# Patient Record
Sex: FEMALE | Race: WHITE | NOT HISPANIC OR LATINO | Employment: OTHER | URBAN - METROPOLITAN AREA
[De-identification: names, ages, dates, MRNs, and addresses within clinical notes are randomized per-mention and may not be internally consistent; named-entity substitution may affect disease eponyms.]

---

## 2017-08-22 ENCOUNTER — APPOINTMENT (OUTPATIENT)
Dept: GENERAL RADIOLOGY | Facility: HOSPITAL | Age: 77
End: 2017-08-22

## 2017-08-22 ENCOUNTER — HOSPITAL ENCOUNTER (INPATIENT)
Facility: HOSPITAL | Age: 77
LOS: 8 days | Discharge: NURSING, REHAB OR LONG-TERM CARE | End: 2017-08-31
Attending: EMERGENCY MEDICINE | Admitting: HOSPITALIST

## 2017-08-22 DIAGNOSIS — N28.9 ACUTE RENAL INSUFFICIENCY: ICD-10-CM

## 2017-08-22 DIAGNOSIS — S72.002A CLOSED FRACTURE OF NECK OF LEFT FEMUR, INITIAL ENCOUNTER (HCC): Primary | ICD-10-CM
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
## 2017-08-22 DIAGNOSIS — R26.2 DIFFICULTY WALKING: ICD-10-CM
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
## 2017-08-22 LAB
ALBUMIN SERPL-MCNC: 4.3 G/DL (ref 3.5–5.2)
ALBUMIN/GLOB SERPL: 1.2 G/DL
ALP SERPL-CCNC: 75 U/L (ref 39–117)
ALT SERPL W P-5'-P-CCNC: 18 U/L (ref 1–33)
ANION GAP SERPL CALCULATED.3IONS-SCNC: 14.6 MMOL/L
AST SERPL-CCNC: 19 U/L (ref 1–32)
BASOPHILS # BLD AUTO: 0.04 10*3/MM3 (ref 0–0.2)
BASOPHILS NFR BLD AUTO: 0.3 % (ref 0–1.5)
BILIRUB SERPL-MCNC: 0.3 MG/DL (ref 0.1–1.2)
BUN BLD-MCNC: 35 MG/DL (ref 8–23)
BUN/CREAT SERPL: 19.2 (ref 7–25)
CALCIUM SPEC-SCNC: 10 MG/DL (ref 8.6–10.5)
CHLORIDE SERPL-SCNC: 99 MMOL/L (ref 98–107)
CO2 SERPL-SCNC: 27.4 MMOL/L (ref 22–29)
CREAT BLD-MCNC: 1.82 MG/DL (ref 0.57–1)
DEPRECATED RDW RBC AUTO: 51 FL (ref 37–54)
EOSINOPHIL # BLD AUTO: 0.24 10*3/MM3 (ref 0–0.7)
EOSINOPHIL NFR BLD AUTO: 1.9 % (ref 0.3–6.2)
ERYTHROCYTE [DISTWIDTH] IN BLOOD BY AUTOMATED COUNT: 15.2 % (ref 11.7–13)
GFR SERPL CREATININE-BSD FRML MDRD: 27 ML/MIN/1.73
GLOBULIN UR ELPH-MCNC: 3.6 GM/DL
GLUCOSE BLD-MCNC: 189 MG/DL (ref 65–99)
HCT VFR BLD AUTO: 41.3 % (ref 35.6–45.5)
HGB BLD-MCNC: 13.1 G/DL (ref 11.9–15.5)
IMM GRANULOCYTES # BLD: 0.1 10*3/MM3 (ref 0–0.03)
IMM GRANULOCYTES NFR BLD: 0.8 % (ref 0–0.5)
LYMPHOCYTES # BLD AUTO: 1.42 10*3/MM3 (ref 0.9–4.8)
LYMPHOCYTES NFR BLD AUTO: 11.2 % (ref 19.6–45.3)
MCH RBC QN AUTO: 29.1 PG (ref 26.9–32)
MCHC RBC AUTO-ENTMCNC: 31.7 G/DL (ref 32.4–36.3)
MCV RBC AUTO: 91.8 FL (ref 80.5–98.2)
MONOCYTES # BLD AUTO: 0.7 10*3/MM3 (ref 0.2–1.2)
MONOCYTES NFR BLD AUTO: 5.5 % (ref 5–12)
NEUTROPHILS # BLD AUTO: 10.17 10*3/MM3 (ref 1.9–8.1)
NEUTROPHILS NFR BLD AUTO: 80.3 % (ref 42.7–76)
PLATELET # BLD AUTO: 168 10*3/MM3 (ref 140–500)
PMV BLD AUTO: 10.3 FL (ref 6–12)
POTASSIUM BLD-SCNC: 3.6 MMOL/L (ref 3.5–5.2)
PROT SERPL-MCNC: 7.9 G/DL (ref 6–8.5)
RBC # BLD AUTO: 4.5 10*6/MM3 (ref 3.9–5.2)
SODIUM BLD-SCNC: 141 MMOL/L (ref 136–145)
WBC NRBC COR # BLD: 12.67 10*3/MM3 (ref 4.5–10.7)

## 2017-08-22 PROCEDURE — 73502 X-RAY EXAM HIP UNI 2-3 VIEWS: CPT

## 2017-08-22 PROCEDURE — 25010000002 ONDANSETRON PER 1 MG: Performed by: PHYSICIAN ASSISTANT

## 2017-08-22 PROCEDURE — 99284 EMERGENCY DEPT VISIT MOD MDM: CPT

## 2017-08-22 PROCEDURE — 93005 ELECTROCARDIOGRAM TRACING: CPT | Performed by: PHYSICIAN ASSISTANT

## 2017-08-22 PROCEDURE — 85025 COMPLETE CBC W/AUTO DIFF WBC: CPT | Performed by: PHYSICIAN ASSISTANT

## 2017-08-22 PROCEDURE — 80053 COMPREHEN METABOLIC PANEL: CPT | Performed by: PHYSICIAN ASSISTANT

## 2017-08-22 PROCEDURE — 86901 BLOOD TYPING SEROLOGIC RH(D): CPT | Performed by: PHYSICIAN ASSISTANT

## 2017-08-22 PROCEDURE — 86900 BLOOD TYPING SEROLOGIC ABO: CPT | Performed by: PHYSICIAN ASSISTANT

## 2017-08-22 PROCEDURE — 71010 HC CHEST PA OR AP: CPT

## 2017-08-22 PROCEDURE — 86850 RBC ANTIBODY SCREEN: CPT | Performed by: PHYSICIAN ASSISTANT

## 2017-08-22 PROCEDURE — 25010000002 HYDROMORPHONE PER 4 MG: Performed by: EMERGENCY MEDICINE

## 2017-08-22 RX ORDER — ONDANSETRON 2 MG/ML
4 INJECTION INTRAMUSCULAR; INTRAVENOUS ONCE
Status: COMPLETED | OUTPATIENT
Start: 2017-08-22 | End: 2017-08-22

## 2017-08-22 RX ORDER — SODIUM CHLORIDE 0.9 % (FLUSH) 0.9 %
10 SYRINGE (ML) INJECTION AS NEEDED
Status: DISCONTINUED | OUTPATIENT
Start: 2017-08-22 | End: 2017-08-31 | Stop reason: HOSPADM

## 2017-08-22 RX ADMIN — ONDANSETRON 4 MG: 2 INJECTION INTRAMUSCULAR; INTRAVENOUS at 22:50

## 2017-08-22 RX ADMIN — HYDROMORPHONE HYDROCHLORIDE 1 MG: 1 INJECTION, SOLUTION INTRAMUSCULAR; INTRAVENOUS; SUBCUTANEOUS at 22:51

## 2017-08-23 ENCOUNTER — APPOINTMENT (OUTPATIENT)
Dept: GENERAL RADIOLOGY | Facility: HOSPITAL | Age: 77
End: 2017-08-23

## 2017-08-23 ENCOUNTER — APPOINTMENT (OUTPATIENT)
Dept: CT IMAGING | Facility: HOSPITAL | Age: 77
End: 2017-08-23
Attending: ORTHOPAEDIC SURGERY

## 2017-08-23 ENCOUNTER — ANESTHESIA (OUTPATIENT)
Dept: PERIOP | Facility: HOSPITAL | Age: 77
End: 2017-08-23

## 2017-08-23 ENCOUNTER — APPOINTMENT (OUTPATIENT)
Dept: GENERAL RADIOLOGY | Facility: HOSPITAL | Age: 77
End: 2017-08-23
Attending: ORTHOPAEDIC SURGERY

## 2017-08-23 ENCOUNTER — ANESTHESIA EVENT (OUTPATIENT)
Dept: PERIOP | Facility: HOSPITAL | Age: 77
End: 2017-08-23

## 2017-08-23 PROBLEM — S72.002A CLOSED FRACTURE OF NECK OF LEFT FEMUR (HCC): Status: ACTIVE | Noted: 2017-08-23

## 2017-08-23 LAB
ABO GROUP BLD: NORMAL
ANION GAP SERPL CALCULATED.3IONS-SCNC: 13.3 MMOL/L
APTT PPP: 28.6 SECONDS (ref 22.7–35.4)
BACTERIA UR QL AUTO: NORMAL /HPF
BILIRUB UR QL STRIP: NEGATIVE
BLD GP AB SCN SERPL QL: NEGATIVE
BUN BLD-MCNC: 35 MG/DL (ref 8–23)
BUN/CREAT SERPL: 20.5 (ref 7–25)
CALCIUM SPEC-SCNC: 8.7 MG/DL (ref 8.6–10.5)
CHLORIDE SERPL-SCNC: 100 MMOL/L (ref 98–107)
CLARITY UR: CLEAR
CO2 SERPL-SCNC: 26.7 MMOL/L (ref 22–29)
COLOR UR: YELLOW
CREAT BLD-MCNC: 1.71 MG/DL (ref 0.57–1)
DEPRECATED RDW RBC AUTO: 51.3 FL (ref 37–54)
ERYTHROCYTE [DISTWIDTH] IN BLOOD BY AUTOMATED COUNT: 15.1 % (ref 11.7–13)
GFR SERPL CREATININE-BSD FRML MDRD: 29 ML/MIN/1.73
GLUCOSE BLD-MCNC: 146 MG/DL (ref 65–99)
GLUCOSE BLDC GLUCOMTR-MCNC: 100 MG/DL (ref 70–130)
GLUCOSE BLDC GLUCOMTR-MCNC: 124 MG/DL (ref 70–130)
GLUCOSE BLDC GLUCOMTR-MCNC: 125 MG/DL (ref 70–130)
GLUCOSE UR STRIP-MCNC: NEGATIVE MG/DL
HBA1C MFR BLD: 5.77 % (ref 4.8–5.6)
HCT VFR BLD AUTO: 38.4 % (ref 35.6–45.5)
HGB BLD-MCNC: 11.9 G/DL (ref 11.9–15.5)
HGB UR QL STRIP.AUTO: NEGATIVE
HYALINE CASTS UR QL AUTO: NORMAL /LPF
INR PPP: 1.07 (ref 0.9–1.1)
KETONES UR QL STRIP: NEGATIVE
LEUKOCYTE ESTERASE UR QL STRIP.AUTO: NEGATIVE
MAGNESIUM SERPL-MCNC: 2.2 MG/DL (ref 1.6–2.4)
MCH RBC QN AUTO: 28.6 PG (ref 26.9–32)
MCHC RBC AUTO-ENTMCNC: 31 G/DL (ref 32.4–36.3)
MCV RBC AUTO: 92.3 FL (ref 80.5–98.2)
NITRITE UR QL STRIP: NEGATIVE
PH UR STRIP.AUTO: 6 [PH] (ref 5–8)
PHOSPHATE SERPL-MCNC: 4.1 MG/DL (ref 2.5–4.5)
PLATELET # BLD AUTO: 148 10*3/MM3 (ref 140–500)
PMV BLD AUTO: 10.2 FL (ref 6–12)
POTASSIUM BLD-SCNC: 3.8 MMOL/L (ref 3.5–5.2)
PROT UR QL STRIP: ABNORMAL
PROTHROMBIN TIME: 13.5 SECONDS (ref 11.7–14.2)
RBC # BLD AUTO: 4.16 10*6/MM3 (ref 3.9–5.2)
RBC # UR: NORMAL /HPF
REF LAB TEST METHOD: NORMAL
RH BLD: POSITIVE
SODIUM BLD-SCNC: 140 MMOL/L (ref 136–145)
SP GR UR STRIP: 1.01 (ref 1–1.03)
SQUAMOUS #/AREA URNS HPF: NORMAL /HPF
UROBILINOGEN UR QL STRIP: ABNORMAL
WBC NRBC COR # BLD: 14.25 10*3/MM3 (ref 4.5–10.7)
WBC UR QL AUTO: NORMAL /HPF

## 2017-08-23 PROCEDURE — 25010000002 MIDAZOLAM PER 1 MG: Performed by: ANESTHESIOLOGY

## 2017-08-23 PROCEDURE — 25010000002 SUCCINYLCHOLINE PER 20 MG: Performed by: NURSE ANESTHETIST, CERTIFIED REGISTERED

## 2017-08-23 PROCEDURE — 83735 ASSAY OF MAGNESIUM: CPT | Performed by: HOSPITALIST

## 2017-08-23 PROCEDURE — 36415 COLL VENOUS BLD VENIPUNCTURE: CPT | Performed by: HOSPITALIST

## 2017-08-23 PROCEDURE — 25010000002 PHENYLEPHRINE PER 1 ML: Performed by: NURSE ANESTHETIST, CERTIFIED REGISTERED

## 2017-08-23 PROCEDURE — 73700 CT LOWER EXTREMITY W/O DYE: CPT

## 2017-08-23 PROCEDURE — 85027 COMPLETE CBC AUTOMATED: CPT | Performed by: HOSPITALIST

## 2017-08-23 PROCEDURE — 76000 FLUOROSCOPY <1 HR PHYS/QHP: CPT

## 2017-08-23 PROCEDURE — C1713 ANCHOR/SCREW BN/BN,TIS/BN: HCPCS | Performed by: ORTHOPAEDIC SURGERY

## 2017-08-23 PROCEDURE — 0QS736Z REPOSITION LEFT UPPER FEMUR WITH INTRAMEDULLARY INTERNAL FIXATION DEVICE, PERCUTANEOUS APPROACH: ICD-10-PCS | Performed by: ORTHOPAEDIC SURGERY

## 2017-08-23 PROCEDURE — 81001 URINALYSIS AUTO W/SCOPE: CPT | Performed by: PHYSICIAN ASSISTANT

## 2017-08-23 PROCEDURE — 82962 GLUCOSE BLOOD TEST: CPT

## 2017-08-23 PROCEDURE — 80048 BASIC METABOLIC PNL TOTAL CA: CPT | Performed by: HOSPITALIST

## 2017-08-23 PROCEDURE — 84100 ASSAY OF PHOSPHORUS: CPT | Performed by: HOSPITALIST

## 2017-08-23 PROCEDURE — 25010000002 HYDROMORPHONE PER 4 MG: Performed by: NURSE ANESTHETIST, CERTIFIED REGISTERED

## 2017-08-23 PROCEDURE — 73552 X-RAY EXAM OF FEMUR 2/>: CPT

## 2017-08-23 PROCEDURE — 83036 HEMOGLOBIN GLYCOSYLATED A1C: CPT | Performed by: HOSPITALIST

## 2017-08-23 PROCEDURE — 25010000002 ONDANSETRON PER 1 MG: Performed by: NURSE ANESTHETIST, CERTIFIED REGISTERED

## 2017-08-23 PROCEDURE — 25010000002 PROPOFOL 10 MG/ML EMULSION: Performed by: NURSE ANESTHETIST, CERTIFIED REGISTERED

## 2017-08-23 PROCEDURE — 25010000003 CEFAZOLIN IN DEXTROSE 2-4 GM/100ML-% SOLUTION: Performed by: ORTHOPAEDIC SURGERY

## 2017-08-23 PROCEDURE — 25010000002 DEXAMETHASONE PER 1 MG: Performed by: NURSE ANESTHETIST, CERTIFIED REGISTERED

## 2017-08-23 PROCEDURE — 25010000002 FENTANYL CITRATE (PF) 100 MCG/2ML SOLUTION: Performed by: NURSE ANESTHETIST, CERTIFIED REGISTERED

## 2017-08-23 PROCEDURE — 25010000002 HYDROMORPHONE PER 4 MG: Performed by: EMERGENCY MEDICINE

## 2017-08-23 PROCEDURE — 93010 ELECTROCARDIOGRAM REPORT: CPT | Performed by: INTERNAL MEDICINE

## 2017-08-23 PROCEDURE — 25010000002 KETOROLAC TROMETHAMINE PER 15 MG: Performed by: NURSE ANESTHETIST, CERTIFIED REGISTERED

## 2017-08-23 PROCEDURE — 85610 PROTHROMBIN TIME: CPT | Performed by: PHYSICIAN ASSISTANT

## 2017-08-23 PROCEDURE — 25010000002 ONDANSETRON PER 1 MG: Performed by: HOSPITALIST

## 2017-08-23 PROCEDURE — 25010000002 FENTANYL CITRATE (PF) 100 MCG/2ML SOLUTION: Performed by: ANESTHESIOLOGY

## 2017-08-23 PROCEDURE — 85730 THROMBOPLASTIN TIME PARTIAL: CPT | Performed by: PHYSICIAN ASSISTANT

## 2017-08-23 PROCEDURE — 25010000002 HYDROMORPHONE PER 4 MG: Performed by: HOSPITALIST

## 2017-08-23 PROCEDURE — 73502 X-RAY EXAM HIP UNI 2-3 VIEWS: CPT

## 2017-08-23 DEVICE — TRIGEN INTERTAN 1.5 11.5MM X 38CM                                    130DEGREE LEFT
Type: IMPLANTABLE DEVICE | Site: FEMUR | Status: FUNCTIONAL
Brand: TRIGEN

## 2017-08-23 DEVICE — TRIGEN LOW PROFILE SCREW 5.0MM X 42.5MM
Type: IMPLANTABLE DEVICE | Site: FEMUR | Status: FUNCTIONAL
Brand: TRIGEN

## 2017-08-23 DEVICE — INTERTAN LAG/COMPRESSION SCREW KIT                                    105MM / 100MM
Type: IMPLANTABLE DEVICE | Site: FEMUR | Status: FUNCTIONAL
Brand: TRIGEN

## 2017-08-23 RX ORDER — OXYCODONE AND ACETAMINOPHEN 7.5; 325 MG/1; MG/1
1 TABLET ORAL EVERY 4 HOURS PRN
Status: DISCONTINUED | OUTPATIENT
Start: 2017-08-23 | End: 2017-08-23

## 2017-08-23 RX ORDER — EPHEDRINE SULFATE 50 MG/ML
5 INJECTION, SOLUTION INTRAVENOUS ONCE AS NEEDED
Status: DISCONTINUED | OUTPATIENT
Start: 2017-08-23 | End: 2017-08-23 | Stop reason: HOSPADM

## 2017-08-23 RX ORDER — FAMOTIDINE 20 MG/1
20 TABLET, FILM COATED ORAL 2 TIMES DAILY
Status: DISCONTINUED | OUTPATIENT
Start: 2017-08-23 | End: 2017-08-25

## 2017-08-23 RX ORDER — PROMETHAZINE HYDROCHLORIDE 25 MG/ML
12.5 INJECTION, SOLUTION INTRAMUSCULAR; INTRAVENOUS ONCE AS NEEDED
Status: DISCONTINUED | OUTPATIENT
Start: 2017-08-23 | End: 2017-08-23 | Stop reason: HOSPADM

## 2017-08-23 RX ORDER — PROMETHAZINE HYDROCHLORIDE 25 MG/1
12.5 TABLET ORAL ONCE AS NEEDED
Status: DISCONTINUED | OUTPATIENT
Start: 2017-08-23 | End: 2017-08-23 | Stop reason: HOSPADM

## 2017-08-23 RX ORDER — HYDROCODONE BITARTRATE AND ACETAMINOPHEN 7.5; 325 MG/1; MG/1
1 TABLET ORAL ONCE AS NEEDED
Status: DISCONTINUED | OUTPATIENT
Start: 2017-08-23 | End: 2017-08-23 | Stop reason: HOSPADM

## 2017-08-23 RX ORDER — SUCCINYLCHOLINE CHLORIDE 20 MG/ML
INJECTION INTRAMUSCULAR; INTRAVENOUS AS NEEDED
Status: DISCONTINUED | OUTPATIENT
Start: 2017-08-23 | End: 2017-08-23 | Stop reason: SURG

## 2017-08-23 RX ORDER — OXYBUTYNIN CHLORIDE 5 MG/1
5 TABLET, EXTENDED RELEASE ORAL
COMMUNITY

## 2017-08-23 RX ORDER — ONDANSETRON 2 MG/ML
4 INJECTION INTRAMUSCULAR; INTRAVENOUS ONCE AS NEEDED
Status: DISCONTINUED | OUTPATIENT
Start: 2017-08-23 | End: 2017-08-23 | Stop reason: HOSPADM

## 2017-08-23 RX ORDER — SODIUM CHLORIDE 9 MG/ML
75 INJECTION, SOLUTION INTRAVENOUS CONTINUOUS
Status: DISCONTINUED | OUTPATIENT
Start: 2017-08-23 | End: 2017-08-29

## 2017-08-23 RX ORDER — FENTANYL CITRATE 50 UG/ML
50 INJECTION, SOLUTION INTRAMUSCULAR; INTRAVENOUS
Status: DISCONTINUED | OUTPATIENT
Start: 2017-08-23 | End: 2017-08-23 | Stop reason: HOSPADM

## 2017-08-23 RX ORDER — OXYCODONE AND ACETAMINOPHEN 7.5; 325 MG/1; MG/1
1 TABLET ORAL ONCE AS NEEDED
Status: DISCONTINUED | OUTPATIENT
Start: 2017-08-23 | End: 2017-08-23 | Stop reason: HOSPADM

## 2017-08-23 RX ORDER — OXYCODONE AND ACETAMINOPHEN 7.5; 325 MG/1; MG/1
1 TABLET ORAL EVERY 4 HOURS PRN
Status: DISCONTINUED | OUTPATIENT
Start: 2017-08-23 | End: 2017-08-25

## 2017-08-23 RX ORDER — LISINOPRIL AND HYDROCHLOROTHIAZIDE 25; 20 MG/1; MG/1
1 TABLET ORAL DAILY
COMMUNITY
End: 2017-08-31 | Stop reason: HOSPADM

## 2017-08-23 RX ORDER — ROCURONIUM BROMIDE 10 MG/ML
INJECTION, SOLUTION INTRAVENOUS AS NEEDED
Status: DISCONTINUED | OUTPATIENT
Start: 2017-08-23 | End: 2017-08-23 | Stop reason: SURG

## 2017-08-23 RX ORDER — ONDANSETRON 2 MG/ML
4 INJECTION INTRAMUSCULAR; INTRAVENOUS EVERY 6 HOURS PRN
Status: DISCONTINUED | OUTPATIENT
Start: 2017-08-23 | End: 2017-08-31 | Stop reason: HOSPADM

## 2017-08-23 RX ORDER — LABETALOL HYDROCHLORIDE 5 MG/ML
5 INJECTION, SOLUTION INTRAVENOUS
Status: DISCONTINUED | OUTPATIENT
Start: 2017-08-23 | End: 2017-08-23 | Stop reason: HOSPADM

## 2017-08-23 RX ORDER — MAGNESIUM HYDROXIDE 1200 MG/15ML
LIQUID ORAL AS NEEDED
Status: DISCONTINUED | OUTPATIENT
Start: 2017-08-23 | End: 2017-08-23 | Stop reason: HOSPADM

## 2017-08-23 RX ORDER — NALOXONE HCL 0.4 MG/ML
0.2 VIAL (ML) INJECTION AS NEEDED
Status: DISCONTINUED | OUTPATIENT
Start: 2017-08-23 | End: 2017-08-23 | Stop reason: HOSPADM

## 2017-08-23 RX ORDER — EPHEDRINE SULFATE 50 MG/ML
INJECTION, SOLUTION INTRAVENOUS AS NEEDED
Status: DISCONTINUED | OUTPATIENT
Start: 2017-08-23 | End: 2017-08-23 | Stop reason: SURG

## 2017-08-23 RX ORDER — ONDANSETRON 4 MG/1
4 TABLET, ORALLY DISINTEGRATING ORAL EVERY 6 HOURS PRN
Status: DISCONTINUED | OUTPATIENT
Start: 2017-08-23 | End: 2017-08-31 | Stop reason: HOSPADM

## 2017-08-23 RX ORDER — FLUMAZENIL 0.1 MG/ML
0.2 INJECTION INTRAVENOUS AS NEEDED
Status: DISCONTINUED | OUTPATIENT
Start: 2017-08-23 | End: 2017-08-23 | Stop reason: HOSPADM

## 2017-08-23 RX ORDER — MIDAZOLAM HYDROCHLORIDE 1 MG/ML
2 INJECTION INTRAMUSCULAR; INTRAVENOUS
Status: DISCONTINUED | OUTPATIENT
Start: 2017-08-23 | End: 2017-08-23 | Stop reason: HOSPADM

## 2017-08-23 RX ORDER — HYDROMORPHONE HYDROCHLORIDE 1 MG/ML
0.5 INJECTION, SOLUTION INTRAMUSCULAR; INTRAVENOUS; SUBCUTANEOUS
Status: DISCONTINUED | OUTPATIENT
Start: 2017-08-23 | End: 2017-08-23 | Stop reason: HOSPADM

## 2017-08-23 RX ORDER — HYDRALAZINE HYDROCHLORIDE 20 MG/ML
5 INJECTION INTRAMUSCULAR; INTRAVENOUS
Status: DISCONTINUED | OUTPATIENT
Start: 2017-08-23 | End: 2017-08-23 | Stop reason: HOSPADM

## 2017-08-23 RX ORDER — OXYCODONE AND ACETAMINOPHEN 7.5; 325 MG/1; MG/1
2 TABLET ORAL EVERY 4 HOURS PRN
Status: DISCONTINUED | OUTPATIENT
Start: 2017-08-23 | End: 2017-08-25

## 2017-08-23 RX ORDER — BISACODYL 5 MG/1
5 TABLET, DELAYED RELEASE ORAL DAILY PRN
Status: DISCONTINUED | OUTPATIENT
Start: 2017-08-23 | End: 2017-08-31 | Stop reason: HOSPADM

## 2017-08-23 RX ORDER — SODIUM CHLORIDE 0.9 % (FLUSH) 0.9 %
1-10 SYRINGE (ML) INJECTION AS NEEDED
Status: DISCONTINUED | OUTPATIENT
Start: 2017-08-23 | End: 2017-08-23 | Stop reason: HOSPADM

## 2017-08-23 RX ORDER — PROPOFOL 10 MG/ML
VIAL (ML) INTRAVENOUS AS NEEDED
Status: DISCONTINUED | OUTPATIENT
Start: 2017-08-23 | End: 2017-08-23 | Stop reason: SURG

## 2017-08-23 RX ORDER — GLYCOPYRROLATE 0.2 MG/ML
INJECTION INTRAMUSCULAR; INTRAVENOUS AS NEEDED
Status: DISCONTINUED | OUTPATIENT
Start: 2017-08-23 | End: 2017-08-23 | Stop reason: SURG

## 2017-08-23 RX ORDER — SODIUM CHLORIDE, SODIUM LACTATE, POTASSIUM CHLORIDE, CALCIUM CHLORIDE 600; 310; 30; 20 MG/100ML; MG/100ML; MG/100ML; MG/100ML
9 INJECTION, SOLUTION INTRAVENOUS CONTINUOUS
Status: DISCONTINUED | OUTPATIENT
Start: 2017-08-23 | End: 2017-08-23

## 2017-08-23 RX ORDER — FENTANYL CITRATE 50 UG/ML
INJECTION, SOLUTION INTRAMUSCULAR; INTRAVENOUS AS NEEDED
Status: DISCONTINUED | OUTPATIENT
Start: 2017-08-23 | End: 2017-08-23 | Stop reason: SURG

## 2017-08-23 RX ORDER — PROMETHAZINE HYDROCHLORIDE 25 MG/1
25 TABLET ORAL ONCE AS NEEDED
Status: DISCONTINUED | OUTPATIENT
Start: 2017-08-23 | End: 2017-08-23 | Stop reason: HOSPADM

## 2017-08-23 RX ORDER — MIDAZOLAM HYDROCHLORIDE 1 MG/ML
1 INJECTION INTRAMUSCULAR; INTRAVENOUS
Status: DISCONTINUED | OUTPATIENT
Start: 2017-08-23 | End: 2017-08-23 | Stop reason: HOSPADM

## 2017-08-23 RX ORDER — ALOGLIPTIN 25 MG/1
25 TABLET, FILM COATED ORAL DAILY
COMMUNITY
End: 2017-08-31 | Stop reason: HOSPADM

## 2017-08-23 RX ORDER — OXYCODONE AND ACETAMINOPHEN 7.5; 325 MG/1; MG/1
2 TABLET ORAL EVERY 4 HOURS PRN
Status: DISCONTINUED | OUTPATIENT
Start: 2017-09-02 | End: 2017-08-23 | Stop reason: SDUPTHER

## 2017-08-23 RX ORDER — ACETAMINOPHEN 325 MG/1
650 TABLET ORAL EVERY 4 HOURS PRN
Status: DISCONTINUED | OUTPATIENT
Start: 2017-08-23 | End: 2017-08-23

## 2017-08-23 RX ORDER — CEFAZOLIN SODIUM 2 G/100ML
2 INJECTION, SOLUTION INTRAVENOUS EVERY 8 HOURS
Status: COMPLETED | OUTPATIENT
Start: 2017-08-24 | End: 2017-08-24

## 2017-08-23 RX ORDER — DEXTROSE MONOHYDRATE 25 G/50ML
25 INJECTION, SOLUTION INTRAVENOUS
Status: DISCONTINUED | OUTPATIENT
Start: 2017-08-23 | End: 2017-08-31 | Stop reason: HOSPADM

## 2017-08-23 RX ORDER — HYDROMORPHONE HYDROCHLORIDE 1 MG/ML
0.5 INJECTION, SOLUTION INTRAMUSCULAR; INTRAVENOUS; SUBCUTANEOUS
Status: DISCONTINUED | OUTPATIENT
Start: 2017-08-23 | End: 2017-08-31 | Stop reason: HOSPADM

## 2017-08-23 RX ORDER — ONDANSETRON 4 MG/1
4 TABLET, FILM COATED ORAL EVERY 6 HOURS PRN
Status: DISCONTINUED | OUTPATIENT
Start: 2017-08-23 | End: 2017-08-31 | Stop reason: HOSPADM

## 2017-08-23 RX ORDER — OXYCODONE AND ACETAMINOPHEN 7.5; 325 MG/1; MG/1
1 TABLET ORAL EVERY 4 HOURS PRN
Status: DISCONTINUED | OUTPATIENT
Start: 2017-08-23 | End: 2017-08-23 | Stop reason: SDUPTHER

## 2017-08-23 RX ORDER — SERTRALINE HYDROCHLORIDE 100 MG/1
100 TABLET, FILM COATED ORAL DAILY
COMMUNITY

## 2017-08-23 RX ORDER — NALOXONE HCL 0.4 MG/ML
0.4 VIAL (ML) INJECTION
Status: DISCONTINUED | OUTPATIENT
Start: 2017-08-23 | End: 2017-08-31 | Stop reason: HOSPADM

## 2017-08-23 RX ORDER — CEFAZOLIN SODIUM 2 G/100ML
2 INJECTION, SOLUTION INTRAVENOUS ONCE
Status: COMPLETED | OUTPATIENT
Start: 2017-08-23 | End: 2017-08-23

## 2017-08-23 RX ORDER — BISACODYL 10 MG
10 SUPPOSITORY, RECTAL RECTAL DAILY PRN
Status: DISCONTINUED | OUTPATIENT
Start: 2017-08-23 | End: 2017-08-31 | Stop reason: HOSPADM

## 2017-08-23 RX ORDER — DEXAMETHASONE SODIUM PHOSPHATE 10 MG/ML
INJECTION INTRAMUSCULAR; INTRAVENOUS AS NEEDED
Status: DISCONTINUED | OUTPATIENT
Start: 2017-08-23 | End: 2017-08-23 | Stop reason: SURG

## 2017-08-23 RX ORDER — ONDANSETRON 2 MG/ML
INJECTION INTRAMUSCULAR; INTRAVENOUS AS NEEDED
Status: DISCONTINUED | OUTPATIENT
Start: 2017-08-23 | End: 2017-08-23 | Stop reason: SURG

## 2017-08-23 RX ORDER — SODIUM CHLORIDE 0.9 % (FLUSH) 0.9 %
1-10 SYRINGE (ML) INJECTION AS NEEDED
Status: DISCONTINUED | OUTPATIENT
Start: 2017-08-23 | End: 2017-08-31 | Stop reason: HOSPADM

## 2017-08-23 RX ORDER — ALBUTEROL SULFATE 2.5 MG/3ML
2.5 SOLUTION RESPIRATORY (INHALATION) ONCE AS NEEDED
Status: DISCONTINUED | OUTPATIENT
Start: 2017-08-23 | End: 2017-08-23 | Stop reason: HOSPADM

## 2017-08-23 RX ORDER — KETOROLAC TROMETHAMINE 30 MG/ML
INJECTION, SOLUTION INTRAMUSCULAR; INTRAVENOUS AS NEEDED
Status: DISCONTINUED | OUTPATIENT
Start: 2017-08-23 | End: 2017-08-23 | Stop reason: SURG

## 2017-08-23 RX ORDER — PROMETHAZINE HYDROCHLORIDE 25 MG/1
25 SUPPOSITORY RECTAL ONCE AS NEEDED
Status: DISCONTINUED | OUTPATIENT
Start: 2017-08-23 | End: 2017-08-23 | Stop reason: HOSPADM

## 2017-08-23 RX ORDER — DIPHENHYDRAMINE HYDROCHLORIDE 50 MG/ML
12.5 INJECTION INTRAMUSCULAR; INTRAVENOUS
Status: DISCONTINUED | OUTPATIENT
Start: 2017-08-23 | End: 2017-08-23 | Stop reason: HOSPADM

## 2017-08-23 RX ORDER — FAMOTIDINE 10 MG/ML
20 INJECTION, SOLUTION INTRAVENOUS ONCE
Status: COMPLETED | OUTPATIENT
Start: 2017-08-23 | End: 2017-08-23

## 2017-08-23 RX ORDER — NICOTINE POLACRILEX 4 MG
15 LOZENGE BUCCAL
Status: DISCONTINUED | OUTPATIENT
Start: 2017-08-23 | End: 2017-08-31 | Stop reason: HOSPADM

## 2017-08-23 RX ORDER — LIDOCAINE HYDROCHLORIDE 20 MG/ML
INJECTION, SOLUTION INFILTRATION; PERINEURAL AS NEEDED
Status: DISCONTINUED | OUTPATIENT
Start: 2017-08-23 | End: 2017-08-23 | Stop reason: SURG

## 2017-08-23 RX ADMIN — HYDROMORPHONE HYDROCHLORIDE 0.5 MG: 1 INJECTION, SOLUTION INTRAMUSCULAR; INTRAVENOUS; SUBCUTANEOUS at 03:42

## 2017-08-23 RX ADMIN — ONDANSETRON 4 MG: 2 INJECTION INTRAMUSCULAR; INTRAVENOUS at 17:22

## 2017-08-23 RX ADMIN — PHENYLEPHRINE HYDROCHLORIDE 100 MCG: 10 INJECTION INTRAVENOUS at 16:40

## 2017-08-23 RX ADMIN — HYDROMORPHONE HYDROCHLORIDE 0.5 MG: 1 INJECTION, SOLUTION INTRAMUSCULAR; INTRAVENOUS; SUBCUTANEOUS at 19:07

## 2017-08-23 RX ADMIN — PHENYLEPHRINE HYDROCHLORIDE 100 MCG: 10 INJECTION INTRAVENOUS at 17:09

## 2017-08-23 RX ADMIN — FENTANYL CITRATE 50 MCG: 50 INJECTION INTRAMUSCULAR; INTRAVENOUS at 19:36

## 2017-08-23 RX ADMIN — PROPOFOL 130 MG: 10 INJECTION, EMULSION INTRAVENOUS at 16:04

## 2017-08-23 RX ADMIN — ROCURONIUM BROMIDE 5 MG: 10 INJECTION INTRAVENOUS at 16:04

## 2017-08-23 RX ADMIN — MIDAZOLAM 1 MG: 1 INJECTION INTRAMUSCULAR; INTRAVENOUS at 15:42

## 2017-08-23 RX ADMIN — HYDROMORPHONE HYDROCHLORIDE 0.5 MG: 1 INJECTION, SOLUTION INTRAMUSCULAR; INTRAVENOUS; SUBCUTANEOUS at 05:43

## 2017-08-23 RX ADMIN — CEFAZOLIN SODIUM 2 G: 2 INJECTION, SOLUTION INTRAVENOUS at 23:02

## 2017-08-23 RX ADMIN — LIDOCAINE HYDROCHLORIDE 80 MG: 20 INJECTION, SOLUTION INFILTRATION; PERINEURAL at 16:04

## 2017-08-23 RX ADMIN — HYDROMORPHONE HYDROCHLORIDE 0.5 MG: 1 INJECTION, SOLUTION INTRAMUSCULAR; INTRAVENOUS; SUBCUTANEOUS at 13:12

## 2017-08-23 RX ADMIN — FENTANYL CITRATE 50 MCG: 50 INJECTION INTRAMUSCULAR; INTRAVENOUS at 18:48

## 2017-08-23 RX ADMIN — FENTANYL CITRATE 25 MCG: 50 INJECTION INTRAMUSCULAR; INTRAVENOUS at 15:42

## 2017-08-23 RX ADMIN — GLYCOPYRROLATE 0.2 MG: 0.2 INJECTION INTRAMUSCULAR; INTRAVENOUS at 16:00

## 2017-08-23 RX ADMIN — FAMOTIDINE 20 MG: 10 INJECTION INTRAVENOUS at 15:25

## 2017-08-23 RX ADMIN — PHENYLEPHRINE HYDROCHLORIDE 100 MCG: 10 INJECTION INTRAVENOUS at 18:00

## 2017-08-23 RX ADMIN — CEFAZOLIN SODIUM 2 G: 2 INJECTION, SOLUTION INTRAVENOUS at 16:00

## 2017-08-23 RX ADMIN — EPHEDRINE SULFATE 15 MG: 50 INJECTION INTRAMUSCULAR; INTRAVENOUS; SUBCUTANEOUS at 17:44

## 2017-08-23 RX ADMIN — SODIUM CHLORIDE 100 ML/HR: 9 INJECTION, SOLUTION INTRAVENOUS at 02:18

## 2017-08-23 RX ADMIN — SUGAMMADEX 2 ML: 100 INJECTION, SOLUTION INTRAVENOUS at 18:01

## 2017-08-23 RX ADMIN — SODIUM CHLORIDE 1000 ML: 9 INJECTION, SOLUTION INTRAVENOUS at 00:12

## 2017-08-23 RX ADMIN — OXYCODONE HYDROCHLORIDE AND ACETAMINOPHEN 1 TABLET: 7.5; 325 TABLET ORAL at 21:31

## 2017-08-23 RX ADMIN — PHENYLEPHRINE HYDROCHLORIDE 100 MCG: 10 INJECTION INTRAVENOUS at 16:25

## 2017-08-23 RX ADMIN — PHENYLEPHRINE HYDROCHLORIDE 100 MCG: 10 INJECTION INTRAVENOUS at 18:09

## 2017-08-23 RX ADMIN — HYDROMORPHONE HYDROCHLORIDE 0.5 MG: 1 INJECTION, SOLUTION INTRAMUSCULAR; INTRAVENOUS; SUBCUTANEOUS at 20:46

## 2017-08-23 RX ADMIN — PHENYLEPHRINE HYDROCHLORIDE 100 MCG: 10 INJECTION INTRAVENOUS at 18:14

## 2017-08-23 RX ADMIN — DEXAMETHASONE SODIUM PHOSPHATE 6 MG: 10 INJECTION INTRAMUSCULAR; INTRAVENOUS at 17:05

## 2017-08-23 RX ADMIN — HYDROMORPHONE HYDROCHLORIDE 0.5 MG: 1 INJECTION, SOLUTION INTRAMUSCULAR; INTRAVENOUS; SUBCUTANEOUS at 08:41

## 2017-08-23 RX ADMIN — SUCCINYLCHOLINE CHLORIDE 100 MG: 20 INJECTION, SOLUTION INTRAMUSCULAR; INTRAVENOUS; PARENTERAL at 16:04

## 2017-08-23 RX ADMIN — ROCURONIUM BROMIDE 20 MG: 10 INJECTION INTRAVENOUS at 16:14

## 2017-08-23 RX ADMIN — ONDANSETRON 4 MG: 2 INJECTION INTRAMUSCULAR; INTRAVENOUS at 08:41

## 2017-08-23 RX ADMIN — FENTANYL CITRATE 50 MCG: 50 INJECTION INTRAMUSCULAR; INTRAVENOUS at 16:00

## 2017-08-23 RX ADMIN — HYDROMORPHONE HYDROCHLORIDE 1 MG: 1 INJECTION, SOLUTION INTRAMUSCULAR; INTRAVENOUS; SUBCUTANEOUS at 01:31

## 2017-08-23 RX ADMIN — SODIUM CHLORIDE, POTASSIUM CHLORIDE, SODIUM LACTATE AND CALCIUM CHLORIDE 9 ML/HR: 600; 310; 30; 20 INJECTION, SOLUTION INTRAVENOUS at 15:25

## 2017-08-23 RX ADMIN — HYDROMORPHONE HYDROCHLORIDE 0.5 MG: 1 INJECTION, SOLUTION INTRAMUSCULAR; INTRAVENOUS; SUBCUTANEOUS at 10:41

## 2017-08-23 RX ADMIN — KETOROLAC TROMETHAMINE 30 MG: 30 INJECTION, SOLUTION INTRAMUSCULAR; INTRAVENOUS at 17:20

## 2017-08-23 RX ADMIN — PHENYLEPHRINE HYDROCHLORIDE 100 MCG: 10 INJECTION INTRAVENOUS at 16:29

## 2017-08-23 RX ADMIN — FENTANYL CITRATE 50 MCG: 50 INJECTION INTRAMUSCULAR; INTRAVENOUS at 18:21

## 2017-08-23 NOTE — ANESTHESIA PROCEDURE NOTES
Airway  Airway not difficult    General Information and Staff    Patient location during procedure: OR  Anesthesiologist: GARCÍA LEE  CRNA: JANA CHUNG    Indications and Patient Condition  Indications for airway management: airway protection    Preoxygenated: yes  MILS maintained throughout  Mask difficulty assessment: 1 - vent by mask    Final Airway Details  Final airway type: endotracheal airway      Successful airway: ETT  Cuffed: yes   Successful intubation technique: direct laryngoscopy  Facilitating devices/methods: cricoid pressure  Endotracheal tube insertion site: oral  Blade: Wilfrido  Blade size: #3  ETT size: 7.0 mm  Cormack-Lehane Classification: grade I - full view of glottis  Placement verified by: chest auscultation and capnometry   Inital cuff pressure (cm H2O): 22  Cuff volume (mL): 6  Measured from: lips  ETT to lips (cm): 22  Number of attempts at approach: 1

## 2017-08-23 NOTE — ANESTHESIA PREPROCEDURE EVALUATION
Anesthesia Evaluation     Patient summary reviewed and Nursing notes reviewed   NPO Solid Status: > 8 hours  NPO Liquid Status: > 8 hours     Airway   Mallampati: II  TM distance: <3 FB  Neck ROM: full  possible difficult intubation  Dental    (+) poor dentition    Pulmonary     breath sounds clear to auscultation  (+) COPD,   Cardiovascular     Rhythm: regular    (+) hypertension, hyperlipidemia      Neuro/Psych  (+) psychiatric history Anxiety,    GI/Hepatic/Renal/Endo    (+) obesity, morbid obesity, GERD, diabetes mellitus,     Musculoskeletal     Abdominal   (+) obese,    Substance History      OB/GYN          Other                                        Anesthesia Plan    ASA 3     general     intravenous induction   Anesthetic plan and risks discussed with patient.

## 2017-08-24 ENCOUNTER — APPOINTMENT (OUTPATIENT)
Dept: ULTRASOUND IMAGING | Facility: HOSPITAL | Age: 77
End: 2017-08-24

## 2017-08-24 ENCOUNTER — APPOINTMENT (OUTPATIENT)
Dept: GENERAL RADIOLOGY | Facility: HOSPITAL | Age: 77
End: 2017-08-24
Attending: ORTHOPAEDIC SURGERY

## 2017-08-24 PROBLEM — N17.9 AKI (ACUTE KIDNEY INJURY) (HCC): Status: ACTIVE | Noted: 2017-08-24

## 2017-08-24 LAB
ANION GAP SERPL CALCULATED.3IONS-SCNC: 15.1 MMOL/L
BACTERIA UR QL AUTO: ABNORMAL /HPF
BILIRUB UR QL STRIP: NEGATIVE
BUN BLD-MCNC: 39 MG/DL (ref 8–23)
BUN/CREAT SERPL: 13 (ref 7–25)
CALCIUM SPEC-SCNC: 9.2 MG/DL (ref 8.6–10.5)
CHLORIDE SERPL-SCNC: 100 MMOL/L (ref 98–107)
CLARITY UR: ABNORMAL
CO2 SERPL-SCNC: 24.9 MMOL/L (ref 22–29)
COLOR UR: YELLOW
CREAT BLD-MCNC: 3.01 MG/DL (ref 0.57–1)
DEPRECATED RDW RBC AUTO: 54.2 FL (ref 37–54)
ERYTHROCYTE [DISTWIDTH] IN BLOOD BY AUTOMATED COUNT: 15.8 % (ref 11.7–13)
GFR SERPL CREATININE-BSD FRML MDRD: 15 ML/MIN/1.73
GLUCOSE BLD-MCNC: 190 MG/DL (ref 65–99)
GLUCOSE BLDC GLUCOMTR-MCNC: 146 MG/DL (ref 70–130)
GLUCOSE BLDC GLUCOMTR-MCNC: 148 MG/DL (ref 70–130)
GLUCOSE BLDC GLUCOMTR-MCNC: 156 MG/DL (ref 70–130)
GLUCOSE BLDC GLUCOMTR-MCNC: 219 MG/DL (ref 70–130)
GLUCOSE UR STRIP-MCNC: NEGATIVE MG/DL
HCT VFR BLD AUTO: 29.3 % (ref 35.6–45.5)
HGB BLD-MCNC: 8.7 G/DL (ref 11.9–15.5)
HGB UR QL STRIP.AUTO: NEGATIVE
HYALINE CASTS UR QL AUTO: ABNORMAL /LPF
KETONES UR QL STRIP: ABNORMAL
LEUKOCYTE ESTERASE UR QL STRIP.AUTO: NEGATIVE
MCH RBC QN AUTO: 28.1 PG (ref 26.9–32)
MCHC RBC AUTO-ENTMCNC: 29.7 G/DL (ref 32.4–36.3)
MCV RBC AUTO: 94.5 FL (ref 80.5–98.2)
NITRITE UR QL STRIP: NEGATIVE
PH UR STRIP.AUTO: <=5 [PH] (ref 5–8)
PLATELET # BLD AUTO: 145 10*3/MM3 (ref 140–500)
PMV BLD AUTO: 10.6 FL (ref 6–12)
POTASSIUM BLD-SCNC: 4.5 MMOL/L (ref 3.5–5.2)
PROT UR QL STRIP: ABNORMAL
RBC # BLD AUTO: 3.1 10*6/MM3 (ref 3.9–5.2)
RBC # UR: ABNORMAL /HPF
REF LAB TEST METHOD: ABNORMAL
SODIUM BLD-SCNC: 140 MMOL/L (ref 136–145)
SP GR UR STRIP: >=1.03 (ref 1–1.03)
SQUAMOUS #/AREA URNS HPF: ABNORMAL /HPF
UROBILINOGEN UR QL STRIP: ABNORMAL
WBC NRBC COR # BLD: 16.9 10*3/MM3 (ref 4.5–10.7)
WBC UR QL AUTO: ABNORMAL /HPF
YEAST URNS QL MICRO: ABNORMAL /HPF

## 2017-08-24 PROCEDURE — 97162 PT EVAL MOD COMPLEX 30 MIN: CPT

## 2017-08-24 PROCEDURE — 25010000003 CEFAZOLIN IN DEXTROSE 2-4 GM/100ML-% SOLUTION: Performed by: ORTHOPAEDIC SURGERY

## 2017-08-24 PROCEDURE — 25010000002 ENOXAPARIN PER 10 MG: Performed by: ORTHOPAEDIC SURGERY

## 2017-08-24 PROCEDURE — 97110 THERAPEUTIC EXERCISES: CPT

## 2017-08-24 PROCEDURE — 73502 X-RAY EXAM HIP UNI 2-3 VIEWS: CPT

## 2017-08-24 PROCEDURE — 82962 GLUCOSE BLOOD TEST: CPT

## 2017-08-24 PROCEDURE — 76775 US EXAM ABDO BACK WALL LIM: CPT

## 2017-08-24 PROCEDURE — 87086 URINE CULTURE/COLONY COUNT: CPT | Performed by: INTERNAL MEDICINE

## 2017-08-24 PROCEDURE — 81001 URINALYSIS AUTO W/SCOPE: CPT | Performed by: INTERNAL MEDICINE

## 2017-08-24 PROCEDURE — 80048 BASIC METABOLIC PNL TOTAL CA: CPT | Performed by: ORTHOPAEDIC SURGERY

## 2017-08-24 PROCEDURE — 25010000002 HYDROMORPHONE PER 4 MG: Performed by: HOSPITALIST

## 2017-08-24 PROCEDURE — 85027 COMPLETE CBC AUTOMATED: CPT | Performed by: ORTHOPAEDIC SURGERY

## 2017-08-24 PROCEDURE — 63710000001 INSULIN ASPART PER 5 UNITS: Performed by: HOSPITALIST

## 2017-08-24 RX ADMIN — OXYCODONE HYDROCHLORIDE AND ACETAMINOPHEN 1 TABLET: 7.5; 325 TABLET ORAL at 02:57

## 2017-08-24 RX ADMIN — FAMOTIDINE 20 MG: 20 TABLET, FILM COATED ORAL at 18:04

## 2017-08-24 RX ADMIN — ENOXAPARIN SODIUM 30 MG: 30 INJECTION SUBCUTANEOUS at 08:46

## 2017-08-24 RX ADMIN — INSULIN ASPART 4 UNITS: 100 INJECTION, SOLUTION INTRAVENOUS; SUBCUTANEOUS at 08:47

## 2017-08-24 RX ADMIN — HYDROMORPHONE HYDROCHLORIDE 0.5 MG: 1 INJECTION, SOLUTION INTRAMUSCULAR; INTRAVENOUS; SUBCUTANEOUS at 04:56

## 2017-08-24 RX ADMIN — FAMOTIDINE 20 MG: 20 TABLET, FILM COATED ORAL at 08:46

## 2017-08-24 RX ADMIN — CEFAZOLIN SODIUM 2 G: 2 INJECTION, SOLUTION INTRAVENOUS at 08:46

## 2017-08-24 RX ADMIN — SODIUM CHLORIDE 100 ML/HR: 9 INJECTION, SOLUTION INTRAVENOUS at 14:33

## 2017-08-24 RX ADMIN — CEFAZOLIN SODIUM 2 G: 2 INJECTION, SOLUTION INTRAVENOUS at 16:10

## 2017-08-24 NOTE — ANESTHESIA POSTPROCEDURE EVALUATION
"Patient: Jaci Cervantes    Procedure Summary     Date Anesthesia Start Anesthesia Stop Room / Location    08/23/17 1556 1826  [facility] /  [facility]       Procedure Diagnosis Surgeon Provider    FEMUR INTRAMEDULLARY NAILING/RODDING (Left Thigh) Closed fracture of neck of left femur, initial encounter  (Closed fracture of neck of left femur, initial encounter [S72.002A]) MD Anthony Domínguez Jr., MD          Anesthesia Type: general  Last vitals  BP   113/56 (08/23/17 1945)    Temp        Pulse   92 (08/23/17 1945)   Resp   12 (08/23/17 1945)    SpO2   95 % (08/23/17 1945)      Post Anesthesia Care and Evaluation    Patient location during evaluation: PACU  Patient participation: complete - patient participated  Level of consciousness: awake and alert  Pain management: adequate  Airway patency: patent  Anesthetic complications: No anesthetic complications    Cardiovascular status: acceptable  Respiratory status: acceptable  Hydration status: acceptable    Comments: /56  Pulse 92  Temp 36.5 °C (97.7 °F) (Oral)   Resp 12  Ht 70\" (177.8 cm)  Wt 240 lb 3.2 oz (109 kg)  LMP Comment: postmenopausal  SpO2 95%  BMI 34.47 kg/m2      "

## 2017-08-25 PROBLEM — D62 ACUTE POST-HEMORRHAGIC ANEMIA: Status: ACTIVE | Noted: 2017-08-25

## 2017-08-25 LAB
ANION GAP SERPL CALCULATED.3IONS-SCNC: 16.9 MMOL/L
BASOPHILS # BLD AUTO: 0.04 10*3/MM3 (ref 0–0.2)
BASOPHILS NFR BLD AUTO: 0.2 % (ref 0–1.5)
BUN BLD-MCNC: 52 MG/DL (ref 8–23)
BUN/CREAT SERPL: 16 (ref 7–25)
CALCIUM SPEC-SCNC: 8.9 MG/DL (ref 8.6–10.5)
CHLORIDE SERPL-SCNC: 96 MMOL/L (ref 98–107)
CO2 SERPL-SCNC: 21.1 MMOL/L (ref 22–29)
CREAT BLD-MCNC: 3.24 MG/DL (ref 0.57–1)
CREAT UR-MCNC: 94.6 MG/DL
DEPRECATED RDW RBC AUTO: 52.2 FL (ref 37–54)
EOSINOPHIL # BLD AUTO: 0.28 10*3/MM3 (ref 0–0.7)
EOSINOPHIL NFR BLD AUTO: 1.7 % (ref 0.3–6.2)
ERYTHROCYTE [DISTWIDTH] IN BLOOD BY AUTOMATED COUNT: 15.6 % (ref 11.7–13)
GFR SERPL CREATININE-BSD FRML MDRD: 14 ML/MIN/1.73
GLUCOSE BLD-MCNC: 157 MG/DL (ref 65–99)
GLUCOSE BLDC GLUCOMTR-MCNC: 122 MG/DL (ref 70–130)
GLUCOSE BLDC GLUCOMTR-MCNC: 130 MG/DL (ref 70–130)
GLUCOSE BLDC GLUCOMTR-MCNC: 132 MG/DL (ref 70–130)
GLUCOSE BLDC GLUCOMTR-MCNC: 164 MG/DL (ref 70–130)
HCT VFR BLD AUTO: 25.1 % (ref 35.6–45.5)
HGB BLD-MCNC: 7.9 G/DL (ref 11.9–15.5)
IMM GRANULOCYTES # BLD: 0.13 10*3/MM3 (ref 0–0.03)
IMM GRANULOCYTES NFR BLD: 0.8 % (ref 0–0.5)
LYMPHOCYTES # BLD AUTO: 1.29 10*3/MM3 (ref 0.9–4.8)
LYMPHOCYTES NFR BLD AUTO: 8 % (ref 19.6–45.3)
MCH RBC QN AUTO: 28.8 PG (ref 26.9–32)
MCHC RBC AUTO-ENTMCNC: 31.5 G/DL (ref 32.4–36.3)
MCV RBC AUTO: 91.6 FL (ref 80.5–98.2)
MONOCYTES # BLD AUTO: 1.09 10*3/MM3 (ref 0.2–1.2)
MONOCYTES NFR BLD AUTO: 6.7 % (ref 5–12)
NEUTROPHILS # BLD AUTO: 13.32 10*3/MM3 (ref 1.9–8.1)
NEUTROPHILS NFR BLD AUTO: 82.6 % (ref 42.7–76)
NRBC BLD MANUAL-RTO: 0.1 /100 WBC (ref 0–0)
PLATELET # BLD AUTO: 109 10*3/MM3 (ref 140–500)
PMV BLD AUTO: 10.7 FL (ref 6–12)
POTASSIUM BLD-SCNC: 4.3 MMOL/L (ref 3.5–5.2)
PROT UR-MCNC: 87 MG/DL
RBC # BLD AUTO: 2.74 10*6/MM3 (ref 3.9–5.2)
SODIUM BLD-SCNC: 134 MMOL/L (ref 136–145)
WBC NRBC COR # BLD: 16.15 10*3/MM3 (ref 4.5–10.7)

## 2017-08-25 PROCEDURE — 82570 ASSAY OF URINE CREATININE: CPT | Performed by: INTERNAL MEDICINE

## 2017-08-25 PROCEDURE — 97110 THERAPEUTIC EXERCISES: CPT

## 2017-08-25 PROCEDURE — 84156 ASSAY OF PROTEIN URINE: CPT | Performed by: INTERNAL MEDICINE

## 2017-08-25 PROCEDURE — 85025 COMPLETE CBC W/AUTO DIFF WBC: CPT | Performed by: INTERNAL MEDICINE

## 2017-08-25 PROCEDURE — 82962 GLUCOSE BLOOD TEST: CPT

## 2017-08-25 PROCEDURE — 63710000001 INSULIN ASPART PER 5 UNITS: Performed by: HOSPITALIST

## 2017-08-25 PROCEDURE — 80048 BASIC METABOLIC PNL TOTAL CA: CPT | Performed by: INTERNAL MEDICINE

## 2017-08-25 PROCEDURE — 25010000002 ENOXAPARIN PER 10 MG: Performed by: ORTHOPAEDIC SURGERY

## 2017-08-25 RX ORDER — HYDROCODONE BITARTRATE AND ACETAMINOPHEN 5; 325 MG/1; MG/1
1 TABLET ORAL EVERY 4 HOURS PRN
Status: DISCONTINUED | OUTPATIENT
Start: 2017-08-25 | End: 2017-08-31 | Stop reason: HOSPADM

## 2017-08-25 RX ORDER — FAMOTIDINE 20 MG/1
20 TABLET, FILM COATED ORAL DAILY
Status: DISCONTINUED | OUTPATIENT
Start: 2017-08-26 | End: 2017-08-31 | Stop reason: HOSPADM

## 2017-08-25 RX ADMIN — OXYCODONE HYDROCHLORIDE AND ACETAMINOPHEN 1 TABLET: 7.5; 325 TABLET ORAL at 15:32

## 2017-08-25 RX ADMIN — ENOXAPARIN SODIUM 30 MG: 30 INJECTION SUBCUTANEOUS at 08:43

## 2017-08-25 RX ADMIN — INSULIN ASPART 2 UNITS: 100 INJECTION, SOLUTION INTRAVENOUS; SUBCUTANEOUS at 08:44

## 2017-08-25 RX ADMIN — FAMOTIDINE 20 MG: 20 TABLET, FILM COATED ORAL at 08:44

## 2017-08-26 LAB
ABO GROUP BLD: NORMAL
ANION GAP SERPL CALCULATED.3IONS-SCNC: 15.1 MMOL/L
BACTERIA SPEC AEROBE CULT: NO GROWTH
BASOPHILS # BLD AUTO: 0.05 10*3/MM3 (ref 0–0.2)
BASOPHILS NFR BLD AUTO: 0.4 % (ref 0–1.5)
BLD GP AB SCN SERPL QL: NEGATIVE
BUN BLD-MCNC: 55 MG/DL (ref 8–23)
BUN/CREAT SERPL: 20.7 (ref 7–25)
CALCIUM SPEC-SCNC: 8.6 MG/DL (ref 8.6–10.5)
CHLORIDE SERPL-SCNC: 102 MMOL/L (ref 98–107)
CO2 SERPL-SCNC: 20.9 MMOL/L (ref 22–29)
CREAT BLD-MCNC: 2.66 MG/DL (ref 0.57–1)
DEPRECATED RDW RBC AUTO: 53 FL (ref 37–54)
EOSINOPHIL # BLD AUTO: 0.24 10*3/MM3 (ref 0–0.7)
EOSINOPHIL NFR BLD AUTO: 2.1 % (ref 0.3–6.2)
ERYTHROCYTE [DISTWIDTH] IN BLOOD BY AUTOMATED COUNT: 15.6 % (ref 11.7–13)
GFR SERPL CREATININE-BSD FRML MDRD: 17 ML/MIN/1.73
GLUCOSE BLD-MCNC: 136 MG/DL (ref 65–99)
GLUCOSE BLDC GLUCOMTR-MCNC: 130 MG/DL (ref 70–130)
GLUCOSE BLDC GLUCOMTR-MCNC: 138 MG/DL (ref 70–130)
GLUCOSE BLDC GLUCOMTR-MCNC: 148 MG/DL (ref 70–130)
GLUCOSE BLDC GLUCOMTR-MCNC: 182 MG/DL (ref 70–130)
HCT VFR BLD AUTO: 22.1 % (ref 35.6–45.5)
HGB BLD-MCNC: 7.2 G/DL (ref 11.9–15.5)
IMM GRANULOCYTES # BLD: 0.08 10*3/MM3 (ref 0–0.03)
IMM GRANULOCYTES NFR BLD: 0.7 % (ref 0–0.5)
LYMPHOCYTES # BLD AUTO: 0.92 10*3/MM3 (ref 0.9–4.8)
LYMPHOCYTES NFR BLD AUTO: 7.9 % (ref 19.6–45.3)
MCH RBC QN AUTO: 30 PG (ref 26.9–32)
MCHC RBC AUTO-ENTMCNC: 32.6 G/DL (ref 32.4–36.3)
MCV RBC AUTO: 92.1 FL (ref 80.5–98.2)
MONOCYTES # BLD AUTO: 0.95 10*3/MM3 (ref 0.2–1.2)
MONOCYTES NFR BLD AUTO: 8.2 % (ref 5–12)
NEUTROPHILS # BLD AUTO: 9.41 10*3/MM3 (ref 1.9–8.1)
NEUTROPHILS NFR BLD AUTO: 80.7 % (ref 42.7–76)
PLATELET # BLD AUTO: 106 10*3/MM3 (ref 140–500)
PMV BLD AUTO: 10.4 FL (ref 6–12)
POTASSIUM BLD-SCNC: 4.1 MMOL/L (ref 3.5–5.2)
RBC # BLD AUTO: 2.4 10*6/MM3 (ref 3.9–5.2)
RH BLD: POSITIVE
SODIUM BLD-SCNC: 138 MMOL/L (ref 136–145)
WBC NRBC COR # BLD: 11.65 10*3/MM3 (ref 4.5–10.7)

## 2017-08-26 PROCEDURE — 36430 TRANSFUSION BLD/BLD COMPNT: CPT

## 2017-08-26 PROCEDURE — 82570 ASSAY OF URINE CREATININE: CPT | Performed by: INTERNAL MEDICINE

## 2017-08-26 PROCEDURE — 85025 COMPLETE CBC W/AUTO DIFF WBC: CPT | Performed by: INTERNAL MEDICINE

## 2017-08-26 PROCEDURE — 63710000001 INSULIN ASPART PER 5 UNITS: Performed by: HOSPITALIST

## 2017-08-26 PROCEDURE — 84156 ASSAY OF PROTEIN URINE: CPT | Performed by: INTERNAL MEDICINE

## 2017-08-26 PROCEDURE — 86900 BLOOD TYPING SEROLOGIC ABO: CPT

## 2017-08-26 PROCEDURE — 81050 URINALYSIS VOLUME MEASURE: CPT | Performed by: INTERNAL MEDICINE

## 2017-08-26 PROCEDURE — 86850 RBC ANTIBODY SCREEN: CPT | Performed by: INTERNAL MEDICINE

## 2017-08-26 PROCEDURE — 25010000002 ENOXAPARIN PER 10 MG: Performed by: ORTHOPAEDIC SURGERY

## 2017-08-26 PROCEDURE — 80048 BASIC METABOLIC PNL TOTAL CA: CPT | Performed by: INTERNAL MEDICINE

## 2017-08-26 PROCEDURE — 86923 COMPATIBILITY TEST ELECTRIC: CPT

## 2017-08-26 PROCEDURE — P9016 RBC LEUKOCYTES REDUCED: HCPCS

## 2017-08-26 PROCEDURE — 82962 GLUCOSE BLOOD TEST: CPT

## 2017-08-26 PROCEDURE — 97110 THERAPEUTIC EXERCISES: CPT

## 2017-08-26 PROCEDURE — 86900 BLOOD TYPING SEROLOGIC ABO: CPT | Performed by: INTERNAL MEDICINE

## 2017-08-26 PROCEDURE — 86901 BLOOD TYPING SEROLOGIC RH(D): CPT | Performed by: INTERNAL MEDICINE

## 2017-08-26 RX ORDER — ACETAMINOPHEN 325 MG/1
650 TABLET ORAL EVERY 6 HOURS PRN
Status: DISCONTINUED | OUTPATIENT
Start: 2017-08-26 | End: 2017-08-31 | Stop reason: HOSPADM

## 2017-08-26 RX ADMIN — FAMOTIDINE 20 MG: 20 TABLET, FILM COATED ORAL at 09:02

## 2017-08-26 RX ADMIN — SODIUM CHLORIDE 100 ML/HR: 9 INJECTION, SOLUTION INTRAVENOUS at 01:28

## 2017-08-26 RX ADMIN — INSULIN ASPART 2 UNITS: 100 INJECTION, SOLUTION INTRAVENOUS; SUBCUTANEOUS at 21:20

## 2017-08-26 RX ADMIN — SODIUM CHLORIDE 100 ML/HR: 9 INJECTION, SOLUTION INTRAVENOUS at 16:23

## 2017-08-26 RX ADMIN — ACETAMINOPHEN 650 MG: 325 TABLET ORAL at 17:20

## 2017-08-26 RX ADMIN — ACETAMINOPHEN 650 MG: 325 TABLET ORAL at 13:10

## 2017-08-26 RX ADMIN — ACETAMINOPHEN 650 MG: 325 TABLET ORAL at 23:42

## 2017-08-26 RX ADMIN — ENOXAPARIN SODIUM 30 MG: 30 INJECTION SUBCUTANEOUS at 09:02

## 2017-08-27 ENCOUNTER — APPOINTMENT (OUTPATIENT)
Dept: MRI IMAGING | Facility: HOSPITAL | Age: 77
End: 2017-08-27

## 2017-08-27 ENCOUNTER — APPOINTMENT (OUTPATIENT)
Dept: GENERAL RADIOLOGY | Facility: HOSPITAL | Age: 77
End: 2017-08-27

## 2017-08-27 ENCOUNTER — APPOINTMENT (OUTPATIENT)
Dept: CARDIOLOGY | Facility: HOSPITAL | Age: 77
End: 2017-08-27
Attending: INTERNAL MEDICINE

## 2017-08-27 PROBLEM — I63.9 ACUTE EMBOLIC STROKE (HCC): Status: ACTIVE | Noted: 2017-08-27

## 2017-08-27 PROBLEM — J18.9 RIGHT LOWER LOBE PNEUMONIA: Status: ACTIVE | Noted: 2017-08-27

## 2017-08-27 LAB
ABO + RH BLD: NORMAL
ANION GAP SERPL CALCULATED.3IONS-SCNC: 15 MMOL/L
BASOPHILS # BLD AUTO: 0.05 10*3/MM3 (ref 0–0.2)
BASOPHILS NFR BLD AUTO: 0.5 % (ref 0–1.5)
BH BB BLOOD EXPIRATION DATE: NORMAL
BH BB BLOOD TYPE BARCODE: 8400
BH BB DISPENSE STATUS: NORMAL
BH BB PRODUCT CODE: NORMAL
BH BB UNIT NUMBER: NORMAL
BUN BLD-MCNC: 55 MG/DL (ref 8–23)
BUN/CREAT SERPL: 23.8 (ref 7–25)
CALCIUM SPEC-SCNC: 8.6 MG/DL (ref 8.6–10.5)
CHLORIDE SERPL-SCNC: 102 MMOL/L (ref 98–107)
CO2 SERPL-SCNC: 21 MMOL/L (ref 22–29)
COLLECT DURATION TIME UR: 24 HRS
COLLECT DURATION TIME UR: 24 HRS
CREAT BLD-MCNC: 2.31 MG/DL (ref 0.57–1)
CREAT UR-MCNC: 81.4 MG/DL
CREATINE 24H UR-MRATE: 1.1 G/24 HR (ref 0.7–1.6)
DEPRECATED RDW RBC AUTO: 49.3 FL (ref 37–54)
EOSINOPHIL # BLD AUTO: 0.48 10*3/MM3 (ref 0–0.7)
EOSINOPHIL NFR BLD AUTO: 4.4 % (ref 0.3–6.2)
ERYTHROCYTE [DISTWIDTH] IN BLOOD BY AUTOMATED COUNT: 15.5 % (ref 11.7–13)
FERRITIN SERPL-MCNC: 204 NG/ML (ref 13–150)
GFR SERPL CREATININE-BSD FRML MDRD: 20 ML/MIN/1.73
GLUCOSE BLD-MCNC: 128 MG/DL (ref 65–99)
GLUCOSE BLDC GLUCOMTR-MCNC: 121 MG/DL (ref 70–130)
GLUCOSE BLDC GLUCOMTR-MCNC: 127 MG/DL (ref 70–130)
GLUCOSE BLDC GLUCOMTR-MCNC: 160 MG/DL (ref 70–130)
GLUCOSE BLDC GLUCOMTR-MCNC: 196 MG/DL (ref 70–130)
HCT VFR BLD AUTO: 23.8 % (ref 35.6–45.5)
HGB BLD-MCNC: 7.8 G/DL (ref 11.9–15.5)
IMM GRANULOCYTES # BLD: 0.08 10*3/MM3 (ref 0–0.03)
IMM GRANULOCYTES NFR BLD: 0.7 % (ref 0–0.5)
IRON 24H UR-MRATE: 27 MCG/DL (ref 37–145)
IRON SATN MFR SERPL: 12 % (ref 20–50)
LYMPHOCYTES # BLD AUTO: 1.29 10*3/MM3 (ref 0.9–4.8)
LYMPHOCYTES NFR BLD AUTO: 11.8 % (ref 19.6–45.3)
MCH RBC QN AUTO: 28.7 PG (ref 26.9–32)
MCHC RBC AUTO-ENTMCNC: 32.8 G/DL (ref 32.4–36.3)
MCV RBC AUTO: 87.5 FL (ref 80.5–98.2)
MONOCYTES # BLD AUTO: 0.98 10*3/MM3 (ref 0.2–1.2)
MONOCYTES NFR BLD AUTO: 9 % (ref 5–12)
NEUTROPHILS # BLD AUTO: 8.03 10*3/MM3 (ref 1.9–8.1)
NEUTROPHILS NFR BLD AUTO: 73.6 % (ref 42.7–76)
PLATELET # BLD AUTO: 129 10*3/MM3 (ref 140–500)
PMV BLD AUTO: 10.1 FL (ref 6–12)
POTASSIUM BLD-SCNC: 4 MMOL/L (ref 3.5–5.2)
PROT 24H UR-MRATE: 1147.5 MG/24HOURS (ref 0–150)
PROT UR-MCNC: 85 MG/DL
RBC # BLD AUTO: 2.72 10*6/MM3 (ref 3.9–5.2)
SODIUM BLD-SCNC: 138 MMOL/L (ref 136–145)
SPECIMEN VOL 24H UR: 1350 ML
SPECIMEN VOL 24H UR: 1350 ML
TIBC SERPL-MCNC: 224 MCG/DL (ref 298–536)
TRANSFERRIN SERPL-MCNC: 150 MG/DL (ref 200–360)
UNIT  ABO: NORMAL
UNIT  RH: NORMAL
WBC NRBC COR # BLD: 10.91 10*3/MM3 (ref 4.5–10.7)

## 2017-08-27 PROCEDURE — 82728 ASSAY OF FERRITIN: CPT | Performed by: INTERNAL MEDICINE

## 2017-08-27 PROCEDURE — 80048 BASIC METABOLIC PNL TOTAL CA: CPT | Performed by: INTERNAL MEDICINE

## 2017-08-27 PROCEDURE — 93005 ELECTROCARDIOGRAM TRACING: CPT | Performed by: INTERNAL MEDICINE

## 2017-08-27 PROCEDURE — 93226 XTRNL ECG REC<48 HR SCAN A/R: CPT

## 2017-08-27 PROCEDURE — 82962 GLUCOSE BLOOD TEST: CPT

## 2017-08-27 PROCEDURE — 70544 MR ANGIOGRAPHY HEAD W/O DYE: CPT

## 2017-08-27 PROCEDURE — 83540 ASSAY OF IRON: CPT | Performed by: INTERNAL MEDICINE

## 2017-08-27 PROCEDURE — 25010000002 CEFTRIAXONE PER 250 MG: Performed by: INTERNAL MEDICINE

## 2017-08-27 PROCEDURE — 85025 COMPLETE CBC W/AUTO DIFF WBC: CPT | Performed by: INTERNAL MEDICINE

## 2017-08-27 PROCEDURE — 63710000001 INSULIN ASPART PER 5 UNITS: Performed by: HOSPITALIST

## 2017-08-27 PROCEDURE — 84466 ASSAY OF TRANSFERRIN: CPT | Performed by: INTERNAL MEDICINE

## 2017-08-27 PROCEDURE — 93225 XTRNL ECG REC<48 HRS REC: CPT

## 2017-08-27 PROCEDURE — 70547 MR ANGIOGRAPHY NECK W/O DYE: CPT

## 2017-08-27 PROCEDURE — 71020 HC CHEST PA AND LATERAL: CPT

## 2017-08-27 PROCEDURE — 70551 MRI BRAIN STEM W/O DYE: CPT

## 2017-08-27 PROCEDURE — 25010000002 ENOXAPARIN PER 10 MG: Performed by: ORTHOPAEDIC SURGERY

## 2017-08-27 PROCEDURE — 93010 ELECTROCARDIOGRAM REPORT: CPT | Performed by: INTERNAL MEDICINE

## 2017-08-27 RX ORDER — LABETALOL 100 MG/1
100 TABLET, FILM COATED ORAL EVERY 12 HOURS SCHEDULED
Status: DISCONTINUED | OUTPATIENT
Start: 2017-08-27 | End: 2017-08-31 | Stop reason: HOSPADM

## 2017-08-27 RX ORDER — HYDRALAZINE HYDROCHLORIDE 20 MG/ML
10 INJECTION INTRAMUSCULAR; INTRAVENOUS EVERY 6 HOURS PRN
Status: DISCONTINUED | OUTPATIENT
Start: 2017-08-27 | End: 2017-08-31 | Stop reason: HOSPADM

## 2017-08-27 RX ORDER — CEFTRIAXONE SODIUM 1 G/50ML
1 INJECTION, SOLUTION INTRAVENOUS EVERY 24 HOURS
Status: DISCONTINUED | OUTPATIENT
Start: 2017-08-27 | End: 2017-08-31 | Stop reason: HOSPADM

## 2017-08-27 RX ORDER — SODIUM CHLORIDE 0.9 % (FLUSH) 0.9 %
1-10 SYRINGE (ML) INJECTION AS NEEDED
Status: DISCONTINUED | OUTPATIENT
Start: 2017-08-27 | End: 2017-08-31 | Stop reason: HOSPADM

## 2017-08-27 RX ORDER — ATORVASTATIN CALCIUM 80 MG/1
80 TABLET, FILM COATED ORAL NIGHTLY
Status: DISCONTINUED | OUTPATIENT
Start: 2017-08-27 | End: 2017-08-31 | Stop reason: HOSPADM

## 2017-08-27 RX ORDER — ASPIRIN 300 MG/1
300 SUPPOSITORY RECTAL DAILY
Status: DISCONTINUED | OUTPATIENT
Start: 2017-08-27 | End: 2017-08-31 | Stop reason: HOSPADM

## 2017-08-27 RX ORDER — CYCLOBENZAPRINE HCL 10 MG
5 TABLET ORAL 3 TIMES DAILY PRN
Status: DISCONTINUED | OUTPATIENT
Start: 2017-08-27 | End: 2017-08-31 | Stop reason: HOSPADM

## 2017-08-27 RX ORDER — SERTRALINE HYDROCHLORIDE 100 MG/1
100 TABLET, FILM COATED ORAL DAILY
Status: DISCONTINUED | OUTPATIENT
Start: 2017-08-27 | End: 2017-08-27

## 2017-08-27 RX ORDER — ASPIRIN 325 MG
325 TABLET ORAL DAILY
Status: DISCONTINUED | OUTPATIENT
Start: 2017-08-27 | End: 2017-08-31 | Stop reason: HOSPADM

## 2017-08-27 RX ADMIN — CYCLOBENZAPRINE HYDROCHLORIDE 5 MG: 10 TABLET, FILM COATED ORAL at 21:08

## 2017-08-27 RX ADMIN — ACETAMINOPHEN 650 MG: 325 TABLET ORAL at 13:54

## 2017-08-27 RX ADMIN — CEFTRIAXONE SODIUM 1 G: 1 INJECTION, SOLUTION INTRAVENOUS at 18:23

## 2017-08-27 RX ADMIN — ACETAMINOPHEN 650 MG: 325 TABLET ORAL at 06:09

## 2017-08-27 RX ADMIN — HYDROCODONE BITARTRATE AND ACETAMINOPHEN 1 TABLET: 5; 325 TABLET ORAL at 21:09

## 2017-08-27 RX ADMIN — ASPIRIN 325 MG: 325 TABLET ORAL at 21:07

## 2017-08-27 RX ADMIN — LABETALOL HCL 100 MG: 100 TABLET, FILM COATED ORAL at 21:07

## 2017-08-27 RX ADMIN — ENOXAPARIN SODIUM 30 MG: 30 INJECTION SUBCUTANEOUS at 09:23

## 2017-08-27 RX ADMIN — FAMOTIDINE 20 MG: 20 TABLET, FILM COATED ORAL at 09:24

## 2017-08-27 RX ADMIN — INSULIN ASPART 2 UNITS: 100 INJECTION, SOLUTION INTRAVENOUS; SUBCUTANEOUS at 21:09

## 2017-08-28 ENCOUNTER — APPOINTMENT (OUTPATIENT)
Dept: CARDIOLOGY | Facility: HOSPITAL | Age: 77
End: 2017-08-28
Attending: INTERNAL MEDICINE

## 2017-08-28 LAB
ANION GAP SERPL CALCULATED.3IONS-SCNC: 13.9 MMOL/L
APTT PPP: 34.5 SECONDS (ref 22.7–35.4)
BASOPHILS # BLD AUTO: 0.04 10*3/MM3 (ref 0–0.2)
BASOPHILS # BLD AUTO: 0.05 10*3/MM3 (ref 0–0.2)
BASOPHILS NFR BLD AUTO: 0.4 % (ref 0–1.5)
BASOPHILS NFR BLD AUTO: 0.5 % (ref 0–1.5)
BH CV ECHO MEAS - ACS: 2 CM
BH CV ECHO MEAS - AO MAX PG: 10 MMHG
BH CV ECHO MEAS - AO MEAN PG (FULL): 3 MMHG
BH CV ECHO MEAS - AO MEAN PG: 5 MMHG
BH CV ECHO MEAS - AO ROOT AREA (BSA CORRECTED): 1.6
BH CV ECHO MEAS - AO ROOT AREA: 10.2 CM^2
BH CV ECHO MEAS - AO ROOT DIAM: 3.6 CM
BH CV ECHO MEAS - AO V2 MAX: 158 CM/SEC
BH CV ECHO MEAS - AO V2 MEAN: 108 CM/SEC
BH CV ECHO MEAS - AO V2 VTI: 35.3 CM
BH CV ECHO MEAS - AVA(I,A): 2.6 CM^2
BH CV ECHO MEAS - AVA(I,D): 2.6 CM^2
BH CV ECHO MEAS - BSA(HAYCOCK): 2.4 M^2
BH CV ECHO MEAS - BSA: 2.3 M^2
BH CV ECHO MEAS - BZI_BMI: 35.7 KILOGRAMS/M^2
BH CV ECHO MEAS - BZI_METRIC_HEIGHT: 177.8 CM
BH CV ECHO MEAS - BZI_METRIC_WEIGHT: 112.9 KG
BH CV ECHO MEAS - CONTRAST EF (2CH): 58.2 ML/M^2
BH CV ECHO MEAS - CONTRAST EF 4CH: 57.6 ML/M^2
BH CV ECHO MEAS - EDV(CUBED): 110.6 ML
BH CV ECHO MEAS - EDV(MOD-SP2): 146 ML
BH CV ECHO MEAS - EDV(MOD-SP4): 139 ML
BH CV ECHO MEAS - EDV(TEICH): 107.5 ML
BH CV ECHO MEAS - EF(CUBED): 61.2 %
BH CV ECHO MEAS - EF(MOD-SP2): 58.2 %
BH CV ECHO MEAS - EF(MOD-SP4): 57.6 %
BH CV ECHO MEAS - EF(TEICH): 52.7 %
BH CV ECHO MEAS - ESV(CUBED): 42.9 ML
BH CV ECHO MEAS - ESV(MOD-SP2): 61 ML
BH CV ECHO MEAS - ESV(MOD-SP4): 59 ML
BH CV ECHO MEAS - ESV(TEICH): 50.9 ML
BH CV ECHO MEAS - FS: 27.1 %
BH CV ECHO MEAS - IVS/LVPW: 1.1
BH CV ECHO MEAS - IVSD: 1.1 CM
BH CV ECHO MEAS - LA DIMENSION: 3.9 CM
BH CV ECHO MEAS - LA/AO: 1.1
BH CV ECHO MEAS - LAT PEAK E' VEL: 4 CM/SEC
BH CV ECHO MEAS - LV DIASTOLIC VOL/BSA (35-75): 60.7 ML/M^2
BH CV ECHO MEAS - LV MASS(C)D: 181.9 GRAMS
BH CV ECHO MEAS - LV MASS(C)DI: 79.4 GRAMS/M^2
BH CV ECHO MEAS - LV MEAN PG: 2 MMHG
BH CV ECHO MEAS - LV SYSTOLIC VOL/BSA (12-30): 25.8 ML/M^2
BH CV ECHO MEAS - LV V1 MAX: 112 CM/SEC
BH CV ECHO MEAS - LV V1 MEAN: 71.9 CM/SEC
BH CV ECHO MEAS - LV V1 VTI: 26.5 CM
BH CV ECHO MEAS - LVIDD: 4.8 CM
BH CV ECHO MEAS - LVIDS: 3.5 CM
BH CV ECHO MEAS - LVLD AP2: 9.1 CM
BH CV ECHO MEAS - LVLD AP4: 9 CM
BH CV ECHO MEAS - LVLS AP2: 7.8 CM
BH CV ECHO MEAS - LVLS AP4: 7.6 CM
BH CV ECHO MEAS - LVOT AREA (M): 3.5 CM^2
BH CV ECHO MEAS - LVOT AREA: 3.5 CM^2
BH CV ECHO MEAS - LVOT DIAM: 2.1 CM
BH CV ECHO MEAS - LVPWD: 1 CM
BH CV ECHO MEAS - MED PEAK E' VEL: 6 CM/SEC
BH CV ECHO MEAS - MV A DUR: 0.14 SEC
BH CV ECHO MEAS - MV A MAX VEL: 133 CM/SEC
BH CV ECHO MEAS - MV DEC SLOPE: 849 CM/SEC^2
BH CV ECHO MEAS - MV DEC TIME: 0.13 SEC
BH CV ECHO MEAS - MV E MAX VEL: 101 CM/SEC
BH CV ECHO MEAS - MV E/A: 0.76
BH CV ECHO MEAS - MV MEAN PG: 5 MMHG
BH CV ECHO MEAS - MV P1/2T MAX VEL: 142 CM/SEC
BH CV ECHO MEAS - MV P1/2T: 49 MSEC
BH CV ECHO MEAS - MV V2 MEAN: 110 CM/SEC
BH CV ECHO MEAS - MV V2 VTI: 34 CM
BH CV ECHO MEAS - MVA P1/2T LCG: 1.5 CM^2
BH CV ECHO MEAS - MVA(P1/2T): 4.5 CM^2
BH CV ECHO MEAS - MVA(VTI): 2.7 CM^2
BH CV ECHO MEAS - PA ACC SLOPE: 998 CM/SEC^2
BH CV ECHO MEAS - PA ACC TIME: 0.1 SEC
BH CV ECHO MEAS - PA MAX PG: 4.4 MMHG
BH CV ECHO MEAS - PA PR(ACCEL): 34.5 MMHG
BH CV ECHO MEAS - PA V2 MAX: 105 CM/SEC
BH CV ECHO MEAS - PULM A REVS DUR: 0.13 SEC
BH CV ECHO MEAS - PULM A REVS VEL: 30.5 CM/SEC
BH CV ECHO MEAS - PULM DIAS VEL: 33.5 CM/SEC
BH CV ECHO MEAS - PULM S/D: 1.3
BH CV ECHO MEAS - PULM SYS VEL: 42.8 CM/SEC
BH CV ECHO MEAS - QP/QS: 0.81
BH CV ECHO MEAS - RV MEAN PG: 1 MMHG
BH CV ECHO MEAS - RV V1 MEAN: 58.2 CM/SEC
BH CV ECHO MEAS - RV V1 VTI: 18 CM
BH CV ECHO MEAS - RVOT AREA: 4.2 CM^2
BH CV ECHO MEAS - RVOT DIAM: 2.3 CM
BH CV ECHO MEAS - SI(AO): 156.8 ML/M^2
BH CV ECHO MEAS - SI(CUBED): 29.6 ML/M^2
BH CV ECHO MEAS - SI(LVOT): 40.1 ML/M^2
BH CV ECHO MEAS - SI(MOD-SP2): 37.1 ML/M^2
BH CV ECHO MEAS - SI(MOD-SP4): 34.9 ML/M^2
BH CV ECHO MEAS - SI(TEICH): 24.7 ML/M^2
BH CV ECHO MEAS - SV(AO): 359.3 ML
BH CV ECHO MEAS - SV(CUBED): 67.7 ML
BH CV ECHO MEAS - SV(LVOT): 91.8 ML
BH CV ECHO MEAS - SV(MOD-SP2): 85 ML
BH CV ECHO MEAS - SV(MOD-SP4): 80 ML
BH CV ECHO MEAS - SV(RVOT): 74.8 ML
BH CV ECHO MEAS - SV(TEICH): 56.7 ML
BH CV ECHO MEAS - TAPSE (>1.6): 2.2 CM2
BH CV VAS BP LEFT ARM: NORMAL MMHG
BH CV XLRA - RV BASE: 3.9 CM
BH CV XLRA - RV LENGTH: 7.8 CM
BH CV XLRA - RV MID: 2.6 CM
BH CV XLRA - TDI S': 12 CM/SEC
BUN BLD-MCNC: 48 MG/DL (ref 8–23)
BUN/CREAT SERPL: 26.2 (ref 7–25)
CALCIUM SPEC-SCNC: 8.4 MG/DL (ref 8.6–10.5)
CHLORIDE SERPL-SCNC: 105 MMOL/L (ref 98–107)
CHOLEST SERPL-MCNC: 164 MG/DL (ref 0–200)
CO2 SERPL-SCNC: 23.1 MMOL/L (ref 22–29)
CREAT BLD-MCNC: 1.83 MG/DL (ref 0.57–1)
DEPRECATED RDW RBC AUTO: 51.2 FL (ref 37–54)
DEPRECATED RDW RBC AUTO: 52 FL (ref 37–54)
E/E' RATIO: 21
EOSINOPHIL # BLD AUTO: 0.57 10*3/MM3 (ref 0–0.7)
EOSINOPHIL # BLD AUTO: 0.59 10*3/MM3 (ref 0–0.7)
EOSINOPHIL NFR BLD AUTO: 6.1 % (ref 0.3–6.2)
EOSINOPHIL NFR BLD AUTO: 6.5 % (ref 0.3–6.2)
ERYTHROCYTE [DISTWIDTH] IN BLOOD BY AUTOMATED COUNT: 15.4 % (ref 11.7–13)
ERYTHROCYTE [DISTWIDTH] IN BLOOD BY AUTOMATED COUNT: 15.4 % (ref 11.7–13)
FOLATE SERPL-MCNC: 4.44 NG/ML (ref 4.78–24.2)
GFR SERPL CREATININE-BSD FRML MDRD: 27 ML/MIN/1.73
GLUCOSE BLD-MCNC: 97 MG/DL (ref 65–99)
GLUCOSE BLDC GLUCOMTR-MCNC: 100 MG/DL (ref 70–130)
GLUCOSE BLDC GLUCOMTR-MCNC: 102 MG/DL (ref 70–130)
GLUCOSE BLDC GLUCOMTR-MCNC: 103 MG/DL (ref 70–130)
GLUCOSE BLDC GLUCOMTR-MCNC: 141 MG/DL (ref 70–130)
GLUCOSE BLDC GLUCOMTR-MCNC: 158 MG/DL (ref 70–130)
HCT VFR BLD AUTO: 24.2 % (ref 35.6–45.5)
HCT VFR BLD AUTO: 25.8 % (ref 35.6–45.5)
HDLC SERPL-MCNC: 29 MG/DL (ref 40–60)
HGB BLD-MCNC: 7.7 G/DL (ref 11.9–15.5)
HGB BLD-MCNC: 8.4 G/DL (ref 11.9–15.5)
IMM GRANULOCYTES # BLD: 0.08 10*3/MM3 (ref 0–0.03)
IMM GRANULOCYTES # BLD: 0.09 10*3/MM3 (ref 0–0.03)
IMM GRANULOCYTES NFR BLD: 0.9 % (ref 0–0.5)
IMM GRANULOCYTES NFR BLD: 1 % (ref 0–0.5)
INR PPP: 1.07 (ref 0.9–1.1)
LDLC SERPL CALC-MCNC: 113 MG/DL (ref 0–100)
LDLC/HDLC SERPL: 3.89 {RATIO}
LEFT ATRIUM VOLUME INDEX: 41 ML/M2
LEFT ATRIUM VOLUME: 86 CM3
LV EF 2D ECHO EST: 58 %
LYMPHOCYTES # BLD AUTO: 1.15 10*3/MM3 (ref 0.9–4.8)
LYMPHOCYTES # BLD AUTO: 1.18 10*3/MM3 (ref 0.9–4.8)
LYMPHOCYTES NFR BLD AUTO: 12.3 % (ref 19.6–45.3)
LYMPHOCYTES NFR BLD AUTO: 13 % (ref 19.6–45.3)
MCH RBC QN AUTO: 29.3 PG (ref 26.9–32)
MCH RBC QN AUTO: 30.1 PG (ref 26.9–32)
MCHC RBC AUTO-ENTMCNC: 31.8 G/DL (ref 32.4–36.3)
MCHC RBC AUTO-ENTMCNC: 32.6 G/DL (ref 32.4–36.3)
MCV RBC AUTO: 92 FL (ref 80.5–98.2)
MCV RBC AUTO: 92.5 FL (ref 80.5–98.2)
MONOCYTES # BLD AUTO: 0.92 10*3/MM3 (ref 0.2–1.2)
MONOCYTES # BLD AUTO: 1 10*3/MM3 (ref 0.2–1.2)
MONOCYTES NFR BLD AUTO: 10.1 % (ref 5–12)
MONOCYTES NFR BLD AUTO: 10.7 % (ref 5–12)
NEUTROPHILS # BLD AUTO: 6.27 10*3/MM3 (ref 1.9–8.1)
NEUTROPHILS # BLD AUTO: 6.48 10*3/MM3 (ref 1.9–8.1)
NEUTROPHILS NFR BLD AUTO: 69.1 % (ref 42.7–76)
NEUTROPHILS NFR BLD AUTO: 69.4 % (ref 42.7–76)
PLATELET # BLD AUTO: 126 10*3/MM3 (ref 140–500)
PLATELET # BLD AUTO: 142 10*3/MM3 (ref 140–500)
PMV BLD AUTO: 10.1 FL (ref 6–12)
PMV BLD AUTO: 9.9 FL (ref 6–12)
POTASSIUM BLD-SCNC: 3.9 MMOL/L (ref 3.5–5.2)
PROTHROMBIN TIME: 13.5 SECONDS (ref 11.7–14.2)
RBC # BLD AUTO: 2.63 10*6/MM3 (ref 3.9–5.2)
RBC # BLD AUTO: 2.79 10*6/MM3 (ref 3.9–5.2)
SODIUM BLD-SCNC: 142 MMOL/L (ref 136–145)
TRIGL SERPL-MCNC: 111 MG/DL (ref 0–150)
VIT B12 BLD-MCNC: 335 PG/ML (ref 211–946)
VLDLC SERPL-MCNC: 22.2 MG/DL (ref 5–40)
WBC NRBC COR # BLD: 9.08 10*3/MM3 (ref 4.5–10.7)
WBC NRBC COR # BLD: 9.34 10*3/MM3 (ref 4.5–10.7)

## 2017-08-28 PROCEDURE — 63710000001 INSULIN ASPART PER 5 UNITS: Performed by: HOSPITALIST

## 2017-08-28 PROCEDURE — 25010000002 CEFTRIAXONE PER 250 MG: Performed by: INTERNAL MEDICINE

## 2017-08-28 PROCEDURE — 97162 PT EVAL MOD COMPLEX 30 MIN: CPT

## 2017-08-28 PROCEDURE — 82607 VITAMIN B-12: CPT | Performed by: INTERNAL MEDICINE

## 2017-08-28 PROCEDURE — 25010000002 PERFLUTREN (DEFINITY) 8.476 MG IN SODIUM CHLORIDE 10 ML INJECTION: Performed by: INTERNAL MEDICINE

## 2017-08-28 PROCEDURE — 25010000002 ENOXAPARIN PER 10 MG: Performed by: ORTHOPAEDIC SURGERY

## 2017-08-28 PROCEDURE — 85610 PROTHROMBIN TIME: CPT | Performed by: INTERNAL MEDICINE

## 2017-08-28 PROCEDURE — 82962 GLUCOSE BLOOD TEST: CPT

## 2017-08-28 PROCEDURE — 80061 LIPID PANEL: CPT | Performed by: INTERNAL MEDICINE

## 2017-08-28 PROCEDURE — 25010000002 HEPARIN (PORCINE) PER 1000 UNITS: Performed by: INTERNAL MEDICINE

## 2017-08-28 PROCEDURE — 80048 BASIC METABOLIC PNL TOTAL CA: CPT | Performed by: INTERNAL MEDICINE

## 2017-08-28 PROCEDURE — 85730 THROMBOPLASTIN TIME PARTIAL: CPT | Performed by: INTERNAL MEDICINE

## 2017-08-28 PROCEDURE — C8929 TTE W OR WO FOL WCON,DOPPLER: HCPCS

## 2017-08-28 PROCEDURE — 99223 1ST HOSP IP/OBS HIGH 75: CPT | Performed by: RADIOLOGY

## 2017-08-28 PROCEDURE — 93306 TTE W/DOPPLER COMPLETE: CPT | Performed by: INTERNAL MEDICINE

## 2017-08-28 PROCEDURE — 85025 COMPLETE CBC W/AUTO DIFF WBC: CPT | Performed by: INTERNAL MEDICINE

## 2017-08-28 PROCEDURE — 82746 ASSAY OF FOLIC ACID SERUM: CPT | Performed by: INTERNAL MEDICINE

## 2017-08-28 PROCEDURE — 94799 UNLISTED PULMONARY SVC/PX: CPT

## 2017-08-28 RX ORDER — WARFARIN SODIUM 5 MG/1
5 TABLET ORAL
Status: DISCONTINUED | OUTPATIENT
Start: 2017-08-28 | End: 2017-08-31 | Stop reason: HOSPADM

## 2017-08-28 RX ORDER — NYSTATIN 100000 U/G
CREAM TOPICAL EVERY 12 HOURS SCHEDULED
Status: DISCONTINUED | OUTPATIENT
Start: 2017-08-28 | End: 2017-08-31 | Stop reason: HOSPADM

## 2017-08-28 RX ORDER — HEPARIN SODIUM 5000 [USP'U]/ML
30.1-35.4 INJECTION, SOLUTION INTRAVENOUS; SUBCUTANEOUS EVERY 6 HOURS PRN
Status: DISCONTINUED | OUTPATIENT
Start: 2017-08-28 | End: 2017-08-31

## 2017-08-28 RX ADMIN — LABETALOL HCL 100 MG: 100 TABLET, FILM COATED ORAL at 10:23

## 2017-08-28 RX ADMIN — NYSTATIN: 100000 CREAM TOPICAL at 22:07

## 2017-08-28 RX ADMIN — ATORVASTATIN CALCIUM 80 MG: 80 TABLET, FILM COATED ORAL at 21:02

## 2017-08-28 RX ADMIN — FAMOTIDINE 20 MG: 20 TABLET, FILM COATED ORAL at 10:24

## 2017-08-28 RX ADMIN — INSULIN ASPART 2 UNITS: 100 INJECTION, SOLUTION INTRAVENOUS; SUBCUTANEOUS at 21:01

## 2017-08-28 RX ADMIN — WARFARIN SODIUM 5 MG: 5 TABLET ORAL at 22:00

## 2017-08-28 RX ADMIN — ASPIRIN 325 MG: 325 TABLET ORAL at 10:23

## 2017-08-28 RX ADMIN — SODIUM CHLORIDE 75 ML/HR: 9 INJECTION, SOLUTION INTRAVENOUS at 10:22

## 2017-08-28 RX ADMIN — PERFLUTREN 3 ML: 6.52 INJECTION, SUSPENSION INTRAVENOUS at 11:48

## 2017-08-28 RX ADMIN — CYCLOBENZAPRINE HYDROCHLORIDE 5 MG: 10 TABLET, FILM COATED ORAL at 14:18

## 2017-08-28 RX ADMIN — HEPARIN SODIUM 8.85 UNITS/KG/HR: 10000 INJECTION, SOLUTION INTRAVENOUS at 22:03

## 2017-08-28 RX ADMIN — ENOXAPARIN SODIUM 30 MG: 30 INJECTION SUBCUTANEOUS at 10:30

## 2017-08-28 RX ADMIN — HYDROCODONE BITARTRATE AND ACETAMINOPHEN 1 TABLET: 5; 325 TABLET ORAL at 16:49

## 2017-08-28 RX ADMIN — LABETALOL HCL 100 MG: 100 TABLET, FILM COATED ORAL at 21:01

## 2017-08-28 RX ADMIN — CEFTRIAXONE SODIUM 1 G: 1 INJECTION, SOLUTION INTRAVENOUS at 16:45

## 2017-08-29 LAB
ANION GAP SERPL CALCULATED.3IONS-SCNC: 12.6 MMOL/L
APTT PPP: 68.3 SECONDS (ref 22.7–35.4)
BASOPHILS # BLD AUTO: 0.04 10*3/MM3 (ref 0–0.2)
BASOPHILS NFR BLD AUTO: 0.5 % (ref 0–1.5)
BUN BLD-MCNC: 41 MG/DL (ref 8–23)
BUN/CREAT SERPL: 28.3 (ref 7–25)
CALCIUM SPEC-SCNC: 8.4 MG/DL (ref 8.6–10.5)
CHLORIDE SERPL-SCNC: 106 MMOL/L (ref 98–107)
CO2 SERPL-SCNC: 22.4 MMOL/L (ref 22–29)
CREAT BLD-MCNC: 1.45 MG/DL (ref 0.57–1)
DEPRECATED RDW RBC AUTO: 52.8 FL (ref 37–54)
EOSINOPHIL # BLD AUTO: 0.54 10*3/MM3 (ref 0–0.7)
EOSINOPHIL NFR BLD AUTO: 6.3 % (ref 0.3–6.2)
ERYTHROCYTE [DISTWIDTH] IN BLOOD BY AUTOMATED COUNT: 15.5 % (ref 11.7–13)
GFR SERPL CREATININE-BSD FRML MDRD: 35 ML/MIN/1.73
GLUCOSE BLD-MCNC: 106 MG/DL (ref 65–99)
GLUCOSE BLDC GLUCOMTR-MCNC: 143 MG/DL (ref 70–130)
GLUCOSE BLDC GLUCOMTR-MCNC: 169 MG/DL (ref 70–130)
GLUCOSE BLDC GLUCOMTR-MCNC: 175 MG/DL (ref 70–130)
GLUCOSE BLDC GLUCOMTR-MCNC: 99 MG/DL (ref 70–130)
HCT VFR BLD AUTO: 23.7 % (ref 35.6–45.5)
HCT VFR BLD AUTO: 30.1 % (ref 35.6–45.5)
HGB BLD-MCNC: 7.4 G/DL (ref 11.9–15.5)
HGB BLD-MCNC: 9.6 G/DL (ref 11.9–15.5)
IMM GRANULOCYTES # BLD: 0.1 10*3/MM3 (ref 0–0.03)
IMM GRANULOCYTES NFR BLD: 1.2 % (ref 0–0.5)
INR PPP: 1.15 (ref 0.9–1.1)
LYMPHOCYTES # BLD AUTO: 1.14 10*3/MM3 (ref 0.9–4.8)
LYMPHOCYTES NFR BLD AUTO: 13.2 % (ref 19.6–45.3)
MCH RBC QN AUTO: 29.1 PG (ref 26.9–32)
MCHC RBC AUTO-ENTMCNC: 31.2 G/DL (ref 32.4–36.3)
MCV RBC AUTO: 93.3 FL (ref 80.5–98.2)
MONOCYTES # BLD AUTO: 0.97 10*3/MM3 (ref 0.2–1.2)
MONOCYTES NFR BLD AUTO: 11.3 % (ref 5–12)
NEUTROPHILS # BLD AUTO: 5.83 10*3/MM3 (ref 1.9–8.1)
NEUTROPHILS NFR BLD AUTO: 67.5 % (ref 42.7–76)
PLATELET # BLD AUTO: 132 10*3/MM3 (ref 140–500)
PMV BLD AUTO: 9.8 FL (ref 6–12)
POTASSIUM BLD-SCNC: 4.1 MMOL/L (ref 3.5–5.2)
PROTHROMBIN TIME: 14.3 SECONDS (ref 11.7–14.2)
RBC # BLD AUTO: 2.54 10*6/MM3 (ref 3.9–5.2)
SODIUM BLD-SCNC: 141 MMOL/L (ref 136–145)
WBC NRBC COR # BLD: 8.62 10*3/MM3 (ref 4.5–10.7)

## 2017-08-29 PROCEDURE — 86900 BLOOD TYPING SEROLOGIC ABO: CPT

## 2017-08-29 PROCEDURE — 85018 HEMOGLOBIN: CPT | Performed by: INTERNAL MEDICINE

## 2017-08-29 PROCEDURE — 92610 EVALUATE SWALLOWING FUNCTION: CPT | Performed by: SPEECH-LANGUAGE PATHOLOGIST

## 2017-08-29 PROCEDURE — 25010000002 CEFTRIAXONE PER 250 MG: Performed by: INTERNAL MEDICINE

## 2017-08-29 PROCEDURE — 97110 THERAPEUTIC EXERCISES: CPT

## 2017-08-29 PROCEDURE — 85014 HEMATOCRIT: CPT | Performed by: INTERNAL MEDICINE

## 2017-08-29 PROCEDURE — 97165 OT EVAL LOW COMPLEX 30 MIN: CPT

## 2017-08-29 PROCEDURE — 63710000001 INSULIN ASPART PER 5 UNITS: Performed by: HOSPITALIST

## 2017-08-29 PROCEDURE — 85730 THROMBOPLASTIN TIME PARTIAL: CPT | Performed by: INTERNAL MEDICINE

## 2017-08-29 PROCEDURE — 99232 SBSQ HOSP IP/OBS MODERATE 35: CPT | Performed by: NURSE PRACTITIONER

## 2017-08-29 PROCEDURE — 85025 COMPLETE CBC W/AUTO DIFF WBC: CPT | Performed by: INTERNAL MEDICINE

## 2017-08-29 PROCEDURE — P9016 RBC LEUKOCYTES REDUCED: HCPCS

## 2017-08-29 PROCEDURE — 86923 COMPATIBILITY TEST ELECTRIC: CPT

## 2017-08-29 PROCEDURE — 25010000002 HEPARIN (PORCINE) PER 1000 UNITS: Performed by: INTERNAL MEDICINE

## 2017-08-29 PROCEDURE — 85610 PROTHROMBIN TIME: CPT | Performed by: INTERNAL MEDICINE

## 2017-08-29 PROCEDURE — 82962 GLUCOSE BLOOD TEST: CPT

## 2017-08-29 PROCEDURE — 36430 TRANSFUSION BLD/BLD COMPNT: CPT

## 2017-08-29 PROCEDURE — 80048 BASIC METABOLIC PNL TOTAL CA: CPT | Performed by: INTERNAL MEDICINE

## 2017-08-29 RX ORDER — BUMETANIDE 0.25 MG/ML
1 INJECTION INTRAMUSCULAR; INTRAVENOUS ONCE
Status: COMPLETED | OUTPATIENT
Start: 2017-08-29 | End: 2017-08-29

## 2017-08-29 RX ADMIN — INSULIN ASPART 2 UNITS: 100 INJECTION, SOLUTION INTRAVENOUS; SUBCUTANEOUS at 17:42

## 2017-08-29 RX ADMIN — WARFARIN SODIUM 5 MG: 5 TABLET ORAL at 17:42

## 2017-08-29 RX ADMIN — HEPARIN SODIUM 8.85 UNITS/KG/HR: 10000 INJECTION, SOLUTION INTRAVENOUS at 23:07

## 2017-08-29 RX ADMIN — LABETALOL HCL 100 MG: 100 TABLET, FILM COATED ORAL at 08:36

## 2017-08-29 RX ADMIN — BUMETANIDE 1 MG: 0.25 INJECTION INTRAMUSCULAR; INTRAVENOUS at 15:37

## 2017-08-29 RX ADMIN — HYDROCODONE BITARTRATE AND ACETAMINOPHEN 1 TABLET: 5; 325 TABLET ORAL at 17:34

## 2017-08-29 RX ADMIN — FAMOTIDINE 20 MG: 20 TABLET, FILM COATED ORAL at 08:36

## 2017-08-29 RX ADMIN — ATORVASTATIN CALCIUM 80 MG: 80 TABLET, FILM COATED ORAL at 22:13

## 2017-08-29 RX ADMIN — LABETALOL HCL 100 MG: 100 TABLET, FILM COATED ORAL at 22:12

## 2017-08-29 RX ADMIN — CEFTRIAXONE SODIUM 1 G: 1 INJECTION, SOLUTION INTRAVENOUS at 16:12

## 2017-08-29 RX ADMIN — ASPIRIN 325 MG: 325 TABLET ORAL at 08:36

## 2017-08-29 RX ADMIN — CYCLOBENZAPRINE HYDROCHLORIDE 5 MG: 10 TABLET, FILM COATED ORAL at 15:37

## 2017-08-29 RX ADMIN — INSULIN ASPART 2 UNITS: 100 INJECTION, SOLUTION INTRAVENOUS; SUBCUTANEOUS at 22:16

## 2017-08-29 RX ADMIN — NYSTATIN: 100000 CREAM TOPICAL at 22:01

## 2017-08-29 RX ADMIN — HYDROCODONE BITARTRATE AND ACETAMINOPHEN 1 TABLET: 5; 325 TABLET ORAL at 05:53

## 2017-08-30 ENCOUNTER — APPOINTMENT (OUTPATIENT)
Dept: GENERAL RADIOLOGY | Facility: HOSPITAL | Age: 77
End: 2017-08-30

## 2017-08-30 LAB
ABO + RH BLD: NORMAL
ABO + RH BLD: NORMAL
ANION GAP SERPL CALCULATED.3IONS-SCNC: 12.6 MMOL/L
APTT PPP: 69.7 SECONDS (ref 22.7–35.4)
APTT PPP: 92.3 SECONDS (ref 22.7–35.4)
BASOPHILS # BLD AUTO: 0.06 10*3/MM3 (ref 0–0.2)
BASOPHILS NFR BLD AUTO: 0.6 % (ref 0–1.5)
BH BB BLOOD EXPIRATION DATE: NORMAL
BH BB BLOOD EXPIRATION DATE: NORMAL
BH BB BLOOD TYPE BARCODE: 8400
BH BB BLOOD TYPE BARCODE: 8400
BH BB DISPENSE STATUS: NORMAL
BH BB DISPENSE STATUS: NORMAL
BH BB PRODUCT CODE: NORMAL
BH BB PRODUCT CODE: NORMAL
BH BB UNIT NUMBER: NORMAL
BH BB UNIT NUMBER: NORMAL
BUN BLD-MCNC: 32 MG/DL (ref 8–23)
BUN/CREAT SERPL: 24.6 (ref 7–25)
CALCIUM SPEC-SCNC: 9 MG/DL (ref 8.6–10.5)
CHLORIDE SERPL-SCNC: 105 MMOL/L (ref 98–107)
CO2 SERPL-SCNC: 24.4 MMOL/L (ref 22–29)
CREAT BLD-MCNC: 1.3 MG/DL (ref 0.57–1)
DEPRECATED RDW RBC AUTO: 51.4 FL (ref 37–54)
EOSINOPHIL # BLD AUTO: 0.53 10*3/MM3 (ref 0–0.7)
EOSINOPHIL NFR BLD AUTO: 5.7 % (ref 0.3–6.2)
ERYTHROCYTE [DISTWIDTH] IN BLOOD BY AUTOMATED COUNT: 15.1 % (ref 11.7–13)
GFR SERPL CREATININE-BSD FRML MDRD: 40 ML/MIN/1.73
GLUCOSE BLD-MCNC: 113 MG/DL (ref 65–99)
GLUCOSE BLDC GLUCOMTR-MCNC: 112 MG/DL (ref 70–130)
GLUCOSE BLDC GLUCOMTR-MCNC: 147 MG/DL (ref 70–130)
GLUCOSE BLDC GLUCOMTR-MCNC: 148 MG/DL (ref 70–130)
GLUCOSE BLDC GLUCOMTR-MCNC: 178 MG/DL (ref 70–130)
HCT VFR BLD AUTO: 30.4 % (ref 35.6–45.5)
HCT VFR BLD AUTO: 33 % (ref 35.6–45.5)
HGB BLD-MCNC: 10.5 G/DL (ref 11.9–15.5)
HGB BLD-MCNC: 9.6 G/DL (ref 11.9–15.5)
IMM GRANULOCYTES # BLD: 0.17 10*3/MM3 (ref 0–0.03)
IMM GRANULOCYTES NFR BLD: 1.8 % (ref 0–0.5)
INR PPP: 1.61 (ref 0.9–1.1)
LYMPHOCYTES # BLD AUTO: 1.5 10*3/MM3 (ref 0.9–4.8)
LYMPHOCYTES NFR BLD AUTO: 16.2 % (ref 19.6–45.3)
MCH RBC QN AUTO: 29.5 PG (ref 26.9–32)
MCHC RBC AUTO-ENTMCNC: 31.6 G/DL (ref 32.4–36.3)
MCV RBC AUTO: 93.5 FL (ref 80.5–98.2)
MONOCYTES # BLD AUTO: 0.85 10*3/MM3 (ref 0.2–1.2)
MONOCYTES NFR BLD AUTO: 9.2 % (ref 5–12)
NEUTROPHILS # BLD AUTO: 6.13 10*3/MM3 (ref 1.9–8.1)
NEUTROPHILS NFR BLD AUTO: 66.5 % (ref 42.7–76)
PLATELET # BLD AUTO: 140 10*3/MM3 (ref 140–500)
PMV BLD AUTO: 10.3 FL (ref 6–12)
POTASSIUM BLD-SCNC: 3.9 MMOL/L (ref 3.5–5.2)
PROTHROMBIN TIME: 18.6 SECONDS (ref 11.7–14.2)
RBC # BLD AUTO: 3.25 10*6/MM3 (ref 3.9–5.2)
SODIUM BLD-SCNC: 142 MMOL/L (ref 136–145)
UNIT  ABO: NORMAL
UNIT  ABO: NORMAL
UNIT  RH: NORMAL
UNIT  RH: NORMAL
WBC NRBC COR # BLD: 9.24 10*3/MM3 (ref 4.5–10.7)

## 2017-08-30 PROCEDURE — 82962 GLUCOSE BLOOD TEST: CPT

## 2017-08-30 PROCEDURE — 93227 XTRNL ECG REC<48 HR R&I: CPT | Performed by: INTERNAL MEDICINE

## 2017-08-30 PROCEDURE — 80048 BASIC METABOLIC PNL TOTAL CA: CPT | Performed by: INTERNAL MEDICINE

## 2017-08-30 PROCEDURE — 97110 THERAPEUTIC EXERCISES: CPT

## 2017-08-30 PROCEDURE — 85018 HEMOGLOBIN: CPT | Performed by: INTERNAL MEDICINE

## 2017-08-30 PROCEDURE — 25010000002 HEPARIN (PORCINE) PER 1000 UNITS: Performed by: INTERNAL MEDICINE

## 2017-08-30 PROCEDURE — 85730 THROMBOPLASTIN TIME PARTIAL: CPT | Performed by: INTERNAL MEDICINE

## 2017-08-30 PROCEDURE — 92611 MOTION FLUOROSCOPY/SWALLOW: CPT

## 2017-08-30 PROCEDURE — 25010000002 HYDRALAZINE PER 20 MG: Performed by: INTERNAL MEDICINE

## 2017-08-30 PROCEDURE — 97112 NEUROMUSCULAR REEDUCATION: CPT

## 2017-08-30 PROCEDURE — 74230 X-RAY XM SWLNG FUNCJ C+: CPT

## 2017-08-30 PROCEDURE — 85025 COMPLETE CBC W/AUTO DIFF WBC: CPT | Performed by: INTERNAL MEDICINE

## 2017-08-30 PROCEDURE — 25010000002 CEFTRIAXONE PER 250 MG: Performed by: INTERNAL MEDICINE

## 2017-08-30 PROCEDURE — 99231 SBSQ HOSP IP/OBS SF/LOW 25: CPT | Performed by: NURSE PRACTITIONER

## 2017-08-30 PROCEDURE — 85014 HEMATOCRIT: CPT | Performed by: INTERNAL MEDICINE

## 2017-08-30 PROCEDURE — 85610 PROTHROMBIN TIME: CPT | Performed by: INTERNAL MEDICINE

## 2017-08-30 RX ORDER — ALBUTEROL SULFATE 2.5 MG/3ML
2.5 SOLUTION RESPIRATORY (INHALATION) EVERY 6 HOURS PRN
Status: DISCONTINUED | OUTPATIENT
Start: 2017-08-30 | End: 2017-08-31 | Stop reason: HOSPADM

## 2017-08-30 RX ORDER — LISINOPRIL 5 MG/1
5 TABLET ORAL
Status: DISCONTINUED | OUTPATIENT
Start: 2017-08-30 | End: 2017-08-31 | Stop reason: HOSPADM

## 2017-08-30 RX ADMIN — FAMOTIDINE 20 MG: 20 TABLET, FILM COATED ORAL at 10:02

## 2017-08-30 RX ADMIN — LISINOPRIL 5 MG: 5 TABLET ORAL at 13:21

## 2017-08-30 RX ADMIN — NYSTATIN: 100000 CREAM TOPICAL at 10:01

## 2017-08-30 RX ADMIN — HYDRALAZINE HYDROCHLORIDE 10 MG: 20 INJECTION INTRAMUSCULAR; INTRAVENOUS at 01:13

## 2017-08-30 RX ADMIN — ASPIRIN 325 MG: 325 TABLET ORAL at 10:01

## 2017-08-30 RX ADMIN — CEFTRIAXONE SODIUM 1 G: 1 INJECTION, SOLUTION INTRAVENOUS at 18:08

## 2017-08-30 RX ADMIN — ACETAMINOPHEN 650 MG: 325 TABLET ORAL at 01:40

## 2017-08-30 RX ADMIN — BISACODYL 10 MG: 10 SUPPOSITORY RECTAL at 21:17

## 2017-08-30 RX ADMIN — HYDROCODONE BITARTRATE AND ACETAMINOPHEN 1 TABLET: 5; 325 TABLET ORAL at 14:12

## 2017-08-30 RX ADMIN — CYCLOBENZAPRINE HYDROCHLORIDE 5 MG: 10 TABLET, FILM COATED ORAL at 01:19

## 2017-08-30 RX ADMIN — HYDROCODONE BITARTRATE AND ACETAMINOPHEN 1 TABLET: 5; 325 TABLET ORAL at 18:17

## 2017-08-30 RX ADMIN — NYSTATIN: 100000 CREAM TOPICAL at 21:14

## 2017-08-30 RX ADMIN — WARFARIN SODIUM 5 MG: 5 TABLET ORAL at 18:08

## 2017-08-30 RX ADMIN — ACETAMINOPHEN 650 MG: 325 TABLET ORAL at 13:22

## 2017-08-30 RX ADMIN — HEPARIN SODIUM 6.85 UNITS/KG/HR: 10000 INJECTION, SOLUTION INTRAVENOUS at 10:03

## 2017-08-30 RX ADMIN — LABETALOL HCL 100 MG: 100 TABLET, FILM COATED ORAL at 21:14

## 2017-08-30 RX ADMIN — ATORVASTATIN CALCIUM 80 MG: 80 TABLET, FILM COATED ORAL at 21:14

## 2017-08-30 RX ADMIN — HYDROCODONE BITARTRATE AND ACETAMINOPHEN 1 TABLET: 5; 325 TABLET ORAL at 10:02

## 2017-08-30 RX ADMIN — LABETALOL HCL 100 MG: 100 TABLET, FILM COATED ORAL at 10:01

## 2017-08-31 ENCOUNTER — APPOINTMENT (OUTPATIENT)
Dept: CARDIOLOGY | Facility: HOSPITAL | Age: 77
End: 2017-08-31

## 2017-08-31 VITALS
HEIGHT: 70 IN | OXYGEN SATURATION: 99 % | HEART RATE: 80 BPM | RESPIRATION RATE: 16 BRPM | BODY MASS INDEX: 35.63 KG/M2 | WEIGHT: 248.9 LBS | TEMPERATURE: 98.3 F | SYSTOLIC BLOOD PRESSURE: 158 MMHG | DIASTOLIC BLOOD PRESSURE: 72 MMHG

## 2017-08-31 LAB
ANION GAP SERPL CALCULATED.3IONS-SCNC: 11.2 MMOL/L
APTT PPP: 84.6 SECONDS (ref 22.7–35.4)
BASOPHILS # BLD AUTO: 0.06 10*3/MM3 (ref 0–0.2)
BASOPHILS NFR BLD AUTO: 0.6 % (ref 0–1.5)
BUN BLD-MCNC: 29 MG/DL (ref 8–23)
BUN/CREAT SERPL: 21 (ref 7–25)
CALCIUM SPEC-SCNC: 9.3 MG/DL (ref 8.6–10.5)
CHLORIDE SERPL-SCNC: 104 MMOL/L (ref 98–107)
CO2 SERPL-SCNC: 26.8 MMOL/L (ref 22–29)
CREAT BLD-MCNC: 1.38 MG/DL (ref 0.57–1)
DEPRECATED RDW RBC AUTO: 53.1 FL (ref 37–54)
EOSINOPHIL # BLD AUTO: 0.47 10*3/MM3 (ref 0–0.7)
EOSINOPHIL NFR BLD AUTO: 4.9 % (ref 0.3–6.2)
ERYTHROCYTE [DISTWIDTH] IN BLOOD BY AUTOMATED COUNT: 15.3 % (ref 11.7–13)
GFR SERPL CREATININE-BSD FRML MDRD: 37 ML/MIN/1.73
GLUCOSE BLD-MCNC: 153 MG/DL (ref 65–99)
GLUCOSE BLDC GLUCOMTR-MCNC: 134 MG/DL (ref 70–130)
GLUCOSE BLDC GLUCOMTR-MCNC: 139 MG/DL (ref 70–130)
GLUCOSE BLDC GLUCOMTR-MCNC: 175 MG/DL (ref 70–130)
HCT VFR BLD AUTO: 29.9 % (ref 35.6–45.5)
HCT VFR BLD AUTO: 29.9 % (ref 35.6–45.5)
HGB BLD-MCNC: 9.4 G/DL (ref 11.9–15.5)
HGB BLD-MCNC: 9.4 G/DL (ref 11.9–15.5)
IMM GRANULOCYTES # BLD: 0.19 10*3/MM3 (ref 0–0.03)
IMM GRANULOCYTES NFR BLD: 2 % (ref 0–0.5)
INR PPP: 1.94 (ref 0.9–1.1)
INR PPP: 2.1 (ref 0.9–1.1)
LYMPHOCYTES # BLD AUTO: 1.21 10*3/MM3 (ref 0.9–4.8)
LYMPHOCYTES NFR BLD AUTO: 12.6 % (ref 19.6–45.3)
MCH RBC QN AUTO: 29.7 PG (ref 26.9–32)
MCHC RBC AUTO-ENTMCNC: 31.4 G/DL (ref 32.4–36.3)
MCV RBC AUTO: 94.6 FL (ref 80.5–98.2)
MONOCYTES # BLD AUTO: 1.07 10*3/MM3 (ref 0.2–1.2)
MONOCYTES NFR BLD AUTO: 11.1 % (ref 5–12)
NEUTROPHILS # BLD AUTO: 6.64 10*3/MM3 (ref 1.9–8.1)
NEUTROPHILS NFR BLD AUTO: 68.8 % (ref 42.7–76)
PLATELET # BLD AUTO: 145 10*3/MM3 (ref 140–500)
PMV BLD AUTO: 10.1 FL (ref 6–12)
POTASSIUM BLD-SCNC: 3.9 MMOL/L (ref 3.5–5.2)
PROTHROMBIN TIME: 21.5 SECONDS (ref 11.7–14.2)
PROTHROMBIN TIME: 22.9 SECONDS (ref 11.7–14.2)
RBC # BLD AUTO: 3.16 10*6/MM3 (ref 3.9–5.2)
SODIUM BLD-SCNC: 142 MMOL/L (ref 136–145)
WBC NRBC COR # BLD: 9.64 10*3/MM3 (ref 4.5–10.7)

## 2017-08-31 PROCEDURE — 85018 HEMOGLOBIN: CPT | Performed by: INTERNAL MEDICINE

## 2017-08-31 PROCEDURE — 85610 PROTHROMBIN TIME: CPT | Performed by: HOSPITALIST

## 2017-08-31 PROCEDURE — 85025 COMPLETE CBC W/AUTO DIFF WBC: CPT | Performed by: INTERNAL MEDICINE

## 2017-08-31 PROCEDURE — 85014 HEMATOCRIT: CPT | Performed by: INTERNAL MEDICINE

## 2017-08-31 PROCEDURE — 97110 THERAPEUTIC EXERCISES: CPT

## 2017-08-31 PROCEDURE — 85730 THROMBOPLASTIN TIME PARTIAL: CPT | Performed by: INTERNAL MEDICINE

## 2017-08-31 PROCEDURE — 85610 PROTHROMBIN TIME: CPT | Performed by: INTERNAL MEDICINE

## 2017-08-31 PROCEDURE — 82962 GLUCOSE BLOOD TEST: CPT

## 2017-08-31 PROCEDURE — 80048 BASIC METABOLIC PNL TOTAL CA: CPT | Performed by: INTERNAL MEDICINE

## 2017-08-31 PROCEDURE — 25010000002 HEPARIN (PORCINE) PER 1000 UNITS: Performed by: INTERNAL MEDICINE

## 2017-08-31 PROCEDURE — 0296T HC EXT ECG > 48HR TO 21 DAY RCRD W/CONECT INTL RCRD: CPT

## 2017-08-31 RX ORDER — NYSTATIN 100000 U/G
CREAM TOPICAL EVERY 12 HOURS SCHEDULED
Start: 2017-08-31

## 2017-08-31 RX ORDER — WARFARIN SODIUM 5 MG/1
TABLET ORAL
Start: 2017-08-31

## 2017-08-31 RX ORDER — ASPIRIN 325 MG
325 TABLET ORAL DAILY
Start: 2017-08-31

## 2017-08-31 RX ORDER — HYDROCODONE BITARTRATE AND ACETAMINOPHEN 5; 325 MG/1; MG/1
1 TABLET ORAL EVERY 4 HOURS PRN
Qty: 42 TABLET
Start: 2017-08-31 | End: 2017-09-04

## 2017-08-31 RX ORDER — LISINOPRIL 5 MG/1
10 TABLET ORAL
Start: 2017-08-31 | End: 2017-09-25 | Stop reason: HOSPADM

## 2017-08-31 RX ORDER — ATORVASTATIN CALCIUM 80 MG/1
80 TABLET, FILM COATED ORAL NIGHTLY
Start: 2017-08-31

## 2017-08-31 RX ADMIN — NYSTATIN: 100000 CREAM TOPICAL at 08:48

## 2017-08-31 RX ADMIN — LABETALOL HCL 100 MG: 100 TABLET, FILM COATED ORAL at 08:48

## 2017-08-31 RX ADMIN — HYDROCODONE BITARTRATE AND ACETAMINOPHEN 1 TABLET: 5; 325 TABLET ORAL at 11:07

## 2017-08-31 RX ADMIN — CYCLOBENZAPRINE HYDROCHLORIDE 5 MG: 10 TABLET, FILM COATED ORAL at 20:37

## 2017-08-31 RX ADMIN — NYSTATIN: 100000 CREAM TOPICAL at 20:45

## 2017-08-31 RX ADMIN — FAMOTIDINE 20 MG: 20 TABLET, FILM COATED ORAL at 08:48

## 2017-08-31 RX ADMIN — ATORVASTATIN CALCIUM 80 MG: 80 TABLET, FILM COATED ORAL at 20:37

## 2017-08-31 RX ADMIN — LISINOPRIL 5 MG: 5 TABLET ORAL at 08:48

## 2017-08-31 RX ADMIN — HEPARIN SODIUM 6.85 UNITS/KG/HR: 10000 INJECTION, SOLUTION INTRAVENOUS at 07:19

## 2017-08-31 RX ADMIN — HYDROCODONE BITARTRATE AND ACETAMINOPHEN 1 TABLET: 5; 325 TABLET ORAL at 01:57

## 2017-08-31 RX ADMIN — HYDROCODONE BITARTRATE AND ACETAMINOPHEN 1 TABLET: 5; 325 TABLET ORAL at 17:22

## 2017-08-31 RX ADMIN — WARFARIN SODIUM 5 MG: 5 TABLET ORAL at 17:19

## 2017-08-31 RX ADMIN — ASPIRIN 325 MG: 325 TABLET ORAL at 08:48

## 2017-08-31 RX ADMIN — LABETALOL HCL 100 MG: 100 TABLET, FILM COATED ORAL at 20:37

## 2017-09-16 PROCEDURE — 0298T ZIO PATCH: CPT | Performed by: INTERNAL MEDICINE

## 2017-09-19 ENCOUNTER — APPOINTMENT (OUTPATIENT)
Dept: CT IMAGING | Facility: HOSPITAL | Age: 77
End: 2017-09-19

## 2017-09-19 ENCOUNTER — APPOINTMENT (OUTPATIENT)
Dept: GENERAL RADIOLOGY | Facility: HOSPITAL | Age: 77
End: 2017-09-19
Attending: INTERNAL MEDICINE

## 2017-09-19 ENCOUNTER — HOSPITAL ENCOUNTER (INPATIENT)
Facility: HOSPITAL | Age: 77
LOS: 6 days | Discharge: NURSING, REHAB OR LONG-TERM CARE | End: 2017-09-25
Attending: FAMILY MEDICINE | Admitting: INTERNAL MEDICINE

## 2017-09-19 DIAGNOSIS — R26.2 DIFFICULTY WALKING: ICD-10-CM

## 2017-09-19 DIAGNOSIS — IMO0001 SURGICAL WOUND INFECTION, INITIAL ENCOUNTER: Primary | ICD-10-CM

## 2017-09-19 DIAGNOSIS — I47.1 SVT (SUPRAVENTRICULAR TACHYCARDIA) (HCC): ICD-10-CM

## 2017-09-19 DIAGNOSIS — K56.41 FECAL IMPACTION (HCC): ICD-10-CM

## 2017-09-19 PROBLEM — Z86.73 HISTORY OF STROKE: Status: ACTIVE | Noted: 2017-09-19

## 2017-09-19 PROBLEM — T81.49XA SURGICAL WOUND INFECTION: Status: ACTIVE | Noted: 2017-09-19

## 2017-09-19 PROBLEM — I47.10 SVT (SUPRAVENTRICULAR TACHYCARDIA): Status: ACTIVE | Noted: 2017-09-19

## 2017-09-19 PROBLEM — N28.9 RENAL INSUFFICIENCY: Status: ACTIVE | Noted: 2017-09-19

## 2017-09-19 LAB
ALBUMIN SERPL-MCNC: 3.6 G/DL (ref 3.5–5.2)
ALBUMIN/GLOB SERPL: 1.2 G/DL
ALP SERPL-CCNC: 103 U/L (ref 39–117)
ALT SERPL W P-5'-P-CCNC: 14 U/L (ref 1–33)
ANION GAP SERPL CALCULATED.3IONS-SCNC: 12.4 MMOL/L
AST SERPL-CCNC: 20 U/L (ref 1–32)
BASOPHILS # BLD AUTO: 0.04 10*3/MM3 (ref 0–0.2)
BASOPHILS NFR BLD AUTO: 0.4 % (ref 0–1.5)
BILIRUB SERPL-MCNC: 0.7 MG/DL (ref 0.1–1.2)
BUN BLD-MCNC: 21 MG/DL (ref 8–23)
BUN/CREAT SERPL: 13 (ref 7–25)
CALCIUM SPEC-SCNC: 9.5 MG/DL (ref 8.6–10.5)
CHLORIDE SERPL-SCNC: 93 MMOL/L (ref 98–107)
CO2 SERPL-SCNC: 31.6 MMOL/L (ref 22–29)
CREAT BLD-MCNC: 1.62 MG/DL (ref 0.57–1)
CRP SERPL-MCNC: 1.05 MG/DL (ref 0–0.5)
D-LACTATE SERPL-SCNC: 1.6 MMOL/L (ref 0.5–2)
DEPRECATED RDW RBC AUTO: 51.4 FL (ref 37–54)
EOSINOPHIL # BLD AUTO: 0.23 10*3/MM3 (ref 0–0.7)
EOSINOPHIL NFR BLD AUTO: 2.1 % (ref 0.3–6.2)
ERYTHROCYTE [DISTWIDTH] IN BLOOD BY AUTOMATED COUNT: 15.4 % (ref 11.7–13)
ERYTHROCYTE [SEDIMENTATION RATE] IN BLOOD: 47 MM/HR (ref 0–30)
GFR SERPL CREATININE-BSD FRML MDRD: 31 ML/MIN/1.73
GLOBULIN UR ELPH-MCNC: 2.9 GM/DL
GLUCOSE BLD-MCNC: 163 MG/DL (ref 65–99)
GLUCOSE BLDC GLUCOMTR-MCNC: 212 MG/DL (ref 70–130)
HCT VFR BLD AUTO: 31.7 % (ref 35.6–45.5)
HGB BLD-MCNC: 10 G/DL (ref 11.9–15.5)
IMM GRANULOCYTES # BLD: 0.09 10*3/MM3 (ref 0–0.03)
IMM GRANULOCYTES NFR BLD: 0.8 % (ref 0–0.5)
INR PPP: 2.41 (ref 0.9–1.1)
LYMPHOCYTES # BLD AUTO: 1.05 10*3/MM3 (ref 0.9–4.8)
LYMPHOCYTES NFR BLD AUTO: 9.6 % (ref 19.6–45.3)
MCH RBC QN AUTO: 28.6 PG (ref 26.9–32)
MCHC RBC AUTO-ENTMCNC: 31.5 G/DL (ref 32.4–36.3)
MCV RBC AUTO: 90.6 FL (ref 80.5–98.2)
MONOCYTES # BLD AUTO: 0.94 10*3/MM3 (ref 0.2–1.2)
MONOCYTES NFR BLD AUTO: 8.6 % (ref 5–12)
NEUTROPHILS # BLD AUTO: 8.59 10*3/MM3 (ref 1.9–8.1)
NEUTROPHILS NFR BLD AUTO: 78.5 % (ref 42.7–76)
PLATELET # BLD AUTO: 218 10*3/MM3 (ref 140–500)
PMV BLD AUTO: 9.6 FL (ref 6–12)
POTASSIUM BLD-SCNC: 3.5 MMOL/L (ref 3.5–5.2)
PROT SERPL-MCNC: 6.5 G/DL (ref 6–8.5)
PROTHROMBIN TIME: 25.4 SECONDS (ref 11.7–14.2)
RBC # BLD AUTO: 3.5 10*6/MM3 (ref 3.9–5.2)
SODIUM BLD-SCNC: 137 MMOL/L (ref 136–145)
WBC NRBC COR # BLD: 10.94 10*3/MM3 (ref 4.5–10.7)

## 2017-09-19 PROCEDURE — 85025 COMPLETE CBC W/AUTO DIFF WBC: CPT | Performed by: FAMILY MEDICINE

## 2017-09-19 PROCEDURE — 82962 GLUCOSE BLOOD TEST: CPT

## 2017-09-19 PROCEDURE — 83605 ASSAY OF LACTIC ACID: CPT | Performed by: FAMILY MEDICINE

## 2017-09-19 PROCEDURE — 63710000001 INSULIN ASPART PER 5 UNITS: Performed by: INTERNAL MEDICINE

## 2017-09-19 PROCEDURE — 93010 ELECTROCARDIOGRAM REPORT: CPT | Performed by: INTERNAL MEDICINE

## 2017-09-19 PROCEDURE — 74000 HC ABDOMEN KUB: CPT

## 2017-09-19 PROCEDURE — 85610 PROTHROMBIN TIME: CPT | Performed by: FAMILY MEDICINE

## 2017-09-19 PROCEDURE — 87040 BLOOD CULTURE FOR BACTERIA: CPT | Performed by: INTERNAL MEDICINE

## 2017-09-19 PROCEDURE — 73700 CT LOWER EXTREMITY W/O DYE: CPT

## 2017-09-19 PROCEDURE — 93005 ELECTROCARDIOGRAM TRACING: CPT | Performed by: FAMILY MEDICINE

## 2017-09-19 PROCEDURE — 80053 COMPREHEN METABOLIC PANEL: CPT | Performed by: FAMILY MEDICINE

## 2017-09-19 PROCEDURE — 86140 C-REACTIVE PROTEIN: CPT | Performed by: ORTHOPAEDIC SURGERY

## 2017-09-19 PROCEDURE — 99285 EMERGENCY DEPT VISIT HI MDM: CPT

## 2017-09-19 PROCEDURE — 85652 RBC SED RATE AUTOMATED: CPT | Performed by: ORTHOPAEDIC SURGERY

## 2017-09-19 RX ORDER — SODIUM CHLORIDE 0.9 % (FLUSH) 0.9 %
1-10 SYRINGE (ML) INJECTION AS NEEDED
Status: DISCONTINUED | OUTPATIENT
Start: 2017-09-19 | End: 2017-09-25 | Stop reason: HOSPADM

## 2017-09-19 RX ORDER — PROMETHAZINE HYDROCHLORIDE 12.5 MG/1
12.5 SUPPOSITORY RECTAL EVERY 6 HOURS PRN
Status: DISCONTINUED | OUTPATIENT
Start: 2017-09-19 | End: 2017-09-25 | Stop reason: HOSPADM

## 2017-09-19 RX ORDER — NICOTINE POLACRILEX 4 MG
15 LOZENGE BUCCAL
Status: DISCONTINUED | OUTPATIENT
Start: 2017-09-19 | End: 2017-09-25 | Stop reason: HOSPADM

## 2017-09-19 RX ORDER — PROMETHAZINE HYDROCHLORIDE 12.5 MG/1
12.5 TABLET ORAL EVERY 6 HOURS PRN
Status: DISCONTINUED | OUTPATIENT
Start: 2017-09-19 | End: 2017-09-25 | Stop reason: HOSPADM

## 2017-09-19 RX ORDER — SERTRALINE HYDROCHLORIDE 100 MG/1
100 TABLET, FILM COATED ORAL DAILY
Status: DISCONTINUED | OUTPATIENT
Start: 2017-09-19 | End: 2017-09-25 | Stop reason: HOSPADM

## 2017-09-19 RX ORDER — SENNA AND DOCUSATE SODIUM 50; 8.6 MG/1; MG/1
2 TABLET, FILM COATED ORAL NIGHTLY
Status: DISCONTINUED | OUTPATIENT
Start: 2017-09-19 | End: 2017-09-25 | Stop reason: HOSPADM

## 2017-09-19 RX ORDER — HYDROCODONE BITARTRATE AND ACETAMINOPHEN 5; 325 MG/1; MG/1
1 TABLET ORAL EVERY 4 HOURS PRN
Status: DISCONTINUED | OUTPATIENT
Start: 2017-09-19 | End: 2017-09-25 | Stop reason: HOSPADM

## 2017-09-19 RX ORDER — ATORVASTATIN CALCIUM 80 MG/1
80 TABLET, FILM COATED ORAL NIGHTLY
Status: DISCONTINUED | OUTPATIENT
Start: 2017-09-19 | End: 2017-09-25 | Stop reason: HOSPADM

## 2017-09-19 RX ORDER — ACETAMINOPHEN 325 MG/1
650 TABLET ORAL EVERY 4 HOURS PRN
Status: DISCONTINUED | OUTPATIENT
Start: 2017-09-19 | End: 2017-09-21

## 2017-09-19 RX ORDER — BISACODYL 10 MG
10 SUPPOSITORY, RECTAL RECTAL DAILY PRN
Status: DISCONTINUED | OUTPATIENT
Start: 2017-09-19 | End: 2017-09-25 | Stop reason: HOSPADM

## 2017-09-19 RX ORDER — SODIUM CHLORIDE 9 MG/ML
75 INJECTION, SOLUTION INTRAVENOUS CONTINUOUS
Status: DISCONTINUED | OUTPATIENT
Start: 2017-09-19 | End: 2017-09-20

## 2017-09-19 RX ORDER — DEXTROSE MONOHYDRATE 25 G/50ML
25 INJECTION, SOLUTION INTRAVENOUS
Status: DISCONTINUED | OUTPATIENT
Start: 2017-09-19 | End: 2017-09-25 | Stop reason: HOSPADM

## 2017-09-19 RX ORDER — PROMETHAZINE HYDROCHLORIDE 25 MG/ML
12.5 INJECTION, SOLUTION INTRAMUSCULAR; INTRAVENOUS EVERY 6 HOURS PRN
Status: DISCONTINUED | OUTPATIENT
Start: 2017-09-19 | End: 2017-09-25 | Stop reason: HOSPADM

## 2017-09-19 RX ADMIN — SENNOSIDES AND DOCUSATE SODIUM 2 TABLET: 8.6; 5 TABLET ORAL at 21:17

## 2017-09-19 RX ADMIN — SODIUM CHLORIDE 75 ML/HR: 9 INJECTION, SOLUTION INTRAVENOUS at 21:19

## 2017-09-19 RX ADMIN — SERTRALINE 100 MG: 100 TABLET, FILM COATED ORAL at 21:17

## 2017-09-19 RX ADMIN — INSULIN ASPART 4 UNITS: 100 INJECTION, SOLUTION INTRAVENOUS; SUBCUTANEOUS at 21:19

## 2017-09-19 RX ADMIN — ATORVASTATIN CALCIUM 80 MG: 80 TABLET, FILM COATED ORAL at 21:17

## 2017-09-19 NOTE — H&P
Name: Jaci Cervantes ADMIT: 2017   : 1940  PCP: Stephan Felder MD    MRN: [id number] LOS: 0 days   AGE/SEX: 77 y.o. female  ROOM:      Chief Complaint   Patient presents with   • Wound Infection       Subjective   Ms. Cervantes is a 77 y.o. female who presents to [facility] from rehab with worsening drainage from her left hip wound after ORIF of left femur fracture during previous admission. She states that she had wound vac eventually removed and then worsened drainage and erythema/swelling. She denies any fevers, chills, or worsened pain at her hip. No SOA or cough. No dysuria or difficulty urinating. She reports constipation and is concerned about possible fecal impaction. She takes PO stool softener. Last BM was today but was small. No abdominal pain, nausea, or vomiting.    Of note, during her previous stay she had CVA, was placed on anticoagulation, and had Zio patch. The Zio patch has been completed and read shows sinus rhythm with episodes of SVT. She has not had chance to follow up with Cardiology yet.    History of Present Illness    Past Medical History:   Diagnosis Date   • COPD (chronic obstructive pulmonary disease)    • Diabetes mellitus    • History of transfusion    • Hyperlipidemia    • Hypertension      Past Surgical History:   Procedure Laterality Date   • HYSTERECTOMY     • IN OPEN FIX INTER/SUBTROCH FX,IMPLNT Left 2017    Procedure: FEMUR INTRAMEDULLARY NAILING/RODDING;  Surgeon: Marcello Reardon Jr., MD;  Location: [facility];  Service: Orthopedics   • TUBAL ABDOMINAL LIGATION       History reviewed. No pertinent family history.  Social History   Substance Use Topics   • Smoking status: Never Smoker   • Smokeless tobacco: None   • Alcohol use No       (Not in a hospital admission)  Allergies:  Review of patient's allergies indicates no known allergies.    Review of Systems   Constitutional: Negative for chills and fever.   HENT: Negative for  sore throat and trouble swallowing.    Eyes: Negative for pain and visual disturbance.   Respiratory: Negative for cough and shortness of breath.    Cardiovascular: Negative for chest pain and palpitations.   Gastrointestinal: Positive for constipation. Negative for diarrhea, nausea and vomiting.   Endocrine: Negative for cold intolerance and heat intolerance.   Genitourinary: Negative for difficulty urinating and dysuria.   Musculoskeletal: Negative for neck pain and neck stiffness.   Skin: Positive for rash and wound.   Allergic/Immunologic: Negative for environmental allergies and food allergies.   Neurological: Negative for seizures and syncope.   Hematological: Negative for adenopathy.   Psychiatric/Behavioral: Negative for agitation and confusion.        Objective    Vital Signs  Temp:  [98.5 °F (36.9 °C)-99.3 °F (37.4 °C)] 99.3 °F (37.4 °C)  Heart Rate:  [] 106  Resp:  [16-23] 16  BP: (158-160)/(96-98) 158/98  SpO2:  [94 %-95 %] 94 %  on  Flow (L/min):  [2] 2;   O2 Device: room air  Body mass index is 36.49 kg/(m^2).    Physical Exam   Constitutional: She appears well-developed and well-nourished. No distress.   HENT:   Head: Normocephalic and atraumatic.   Nose: Nose normal.   Eyes: EOM are normal. Pupils are equal, round, and reactive to light.   Neck: Normal range of motion. Neck supple.   Cardiovascular: Regular rhythm, normal heart sounds and intact distal pulses.  Tachycardia present.    Pulmonary/Chest: Effort normal and breath sounds normal. She has no wheezes. She has no rales.   Abdominal: Soft. Bowel sounds are normal. She exhibits no distension. There is no tenderness. There is no rebound and no guarding.   Musculoskeletal: She exhibits tenderness. She exhibits no edema.   Neurological: She is alert. No cranial nerve deficit.   Skin: Skin is warm and dry. Rash noted. She is not diaphoretic.   Left hip surgical wound with erythema and drainage   Psychiatric: She has a normal mood and affect.  Her behavior is normal.   Nursing note and vitals reviewed.      Results Review:   I reviewed the patient's new clinical results. Reviewed EKG, sinus rhythm, no Q waves. Reviewed prior records.    Results from last 7 days  Lab Units 09/19/17  1504   WBC 10*3/mm3 10.94*   HEMOGLOBIN g/dL 10.0*   PLATELETS 10*3/mm3 218     Results from last 7 days  Lab Units 09/19/17  1504   SODIUM mmol/L 137   POTASSIUM mmol/L 3.5   CHLORIDE mmol/L 93*   CO2 mmol/L 31.6*   BUN mg/dL 21   CREATININE mg/dL 1.62*   GLUCOSE mg/dL 163*   ALBUMIN g/dL 3.60   BILIRUBIN mg/dL 0.7   ALK PHOS U/L 103   AST (SGOT) U/L 20   ALT (SGPT) U/L 14   Estimated Creatinine Clearance: 36.4 mL/min (by C-G formula based on Cr of 1.62).  Results from last 7 days  Lab Units 09/19/17  1504   INR  2.41*           No orders to display     Assessment/Plan   Assessment:     Active Hospital Problems (** Indicates Principal Problem)    Diagnosis Date Noted   • **Surgical wound infection [T81.4XXA] 09/19/2017   • Renal insufficiency [N28.9] 09/19/2017   • History of stroke [Z86.73] 09/19/2017   • SVT (supraventricular tachycardia) [I47.1] 09/19/2017   • Type 2 diabetes mellitus with complication [E11.8] 03/04/2016   • COPD (chronic obstructive pulmonary disease) [J44.9] 03/04/2016   • Essential hypertension [I10] 03/04/2016      Resolved Hospital Problems    Diagnosis Date Noted Date Resolved   No resolved problems to display.       Plan:     - Left Hip Wound Infection: Not septic at this time so will avoid antibiotics until cultures are drawn. Consult Orthopedics and Infectious Disease.  - SVT: Sinus currently. Will consult Cardiology to follow and for preop clearance.  - DM2: Not on medications. Check A1c. SSI.  - Hx CVA: Hold Anticoagulation in anticipation of surgery.  - Renal Insufficiency: Previous Cr magdalene was 1.3. Now 1.6. Hold lisinopril. Check UA. Monitor with IVF.  - HTN: Will monitor.  - Constipation: Will give bowel regimen, check KUB, and monitor. If  continues, can give enema.  - PPx: Currently AC  - Full Code    I discussed the patients findings and my recommendations with patient, family and consulting provider.    Harvinder Huerta MD  [facility]  09/19/17  4:47 PM

## 2017-09-19 NOTE — ED PROVIDER NOTES
EMERGENCY DEPARTMENT ENCOUNTER    CHIEF COMPLAINT  Chief Complaint: Wound infection in L hip  History given by: Pt and family  History limited by: Nothing  Room Number: TOY/TOY  PMD: Stephan Felder MD      HPI:  Pt is a 77 y.o. female who presents complaining of a wound infection in her L hip, which was first noticed 6 days ago. The pt states she had an ORIF of a left hip fracture about a month ago. The family states the pt has gone to rehab at [place] after her surgery, which is where her wound infection was first noticed 6 days ago.  Antibiotics were started then.  Family reports a severe hemorrhoid that causes pain, onset a couple weeks ago. Pt denies nausea, fever, vomiting, or any other sickness. Pt reports that she is on oxygen at [place].     Duration:  6 days  Onset: gradual  Timing: constant  Location: L hip  Quality: wound infection  Intensity/Severity: moderate  Progression: unchanged  Associated Symptoms: None  Aggravating Factors: none  Alleviating Factors: none  Previous Episodes: Pt had her L hip internally fixed 4 weeks ago   Treatment before arrival: Pt reports she was being rehabilitated at [place].     PAST MEDICAL HISTORY  Active Ambulatory Problems     Diagnosis Date Noted   • COPD (chronic obstructive pulmonary disease) 03/04/2016   • Influenza A 03/04/2016   • Essential hypertension 03/04/2016   • Type 2 diabetes mellitus with complication 03/04/2016   • GERD (gastroesophageal reflux disease) 03/04/2016   • Anxiety 03/04/2016   • Closed fracture of neck of left femur 08/23/2017   • MAURICE (acute kidney injury) 08/24/2017   • Acute post-hemorrhagic anemia 08/25/2017   • Acute embolic stroke 08/27/2017   • Right lower lobe pneumonia 08/27/2017     Resolved Ambulatory Problems     Diagnosis Date Noted   • No Resolved Ambulatory Problems     Past Medical History:   Diagnosis Date   • COPD (chronic obstructive pulmonary disease)    • Diabetes mellitus    • History of transfusion    •  Hyperlipidemia    • Hypertension        PAST SURGICAL HISTORY  Past Surgical History:   Procedure Laterality Date   • HYSTERECTOMY     • [place] OPEN FIX INTER/SUBTROCH FX,IMPLNT Left 8/23/2017    Procedure: FEMUR INTRAMEDULLARY NAILING/RODDING;  Surgeon: Marcello Reardon Jr., MD;  Location: [facility];  Service: Orthopedics   • TUBAL ABDOMINAL LIGATION         FAMILY HISTORY  History reviewed. No pertinent family history.    SOCIAL HISTORY  Social History     Social History   • Marital status:      Spouse name: N/A   • Number of children: N/A   • Years of education: N/A     Occupational History   • Not on file.     Social History Main Topics   • Smoking status: Never Smoker   • Smokeless tobacco: Not on file   • Alcohol use No   • Drug use: No   • Sexual activity: Defer     Other Topics Concern   • Not on file     Social History Narrative       ALLERGIES  Review of patient's allergies indicates no known allergies.    REVIEW OF SYSTEMS  Review of Systems   Constitutional: Negative for fever.   HENT: Negative for sore throat.    Eyes: Negative.    Respiratory: Negative for cough and shortness of breath.    Cardiovascular: Negative for chest pain.   Gastrointestinal: Negative for abdominal pain, diarrhea and vomiting.        Family reports the pt has an internal hemorrhoid   Genitourinary: Negative for dysuria.   Musculoskeletal: Negative for neck pain.   Skin: Negative for rash.        Wound infection of L hip   Allergic/Immunologic: Negative.    Neurological: Negative for weakness, numbness and headaches.   Hematological: Negative.    Psychiatric/Behavioral: Negative.    All other systems reviewed and are negative.      PHYSICAL EXAM  ED Triage Vitals   Temp Heart Rate Resp BP SpO2   09/19/17 1301 09/19/17 1301 09/19/17 1301 -- 09/19/17 1301   98.5 °F (36.9 °C) 109 23  95 %      Temp src Heart Rate Source Patient Position BP Location FiO2 (%)   09/19/17 1301 -- -- -- --   Tympanic           Physical Exam    Constitutional: She is oriented to person, place, and time and well-developed, well-nourished, and in no distress. No distress.   HENT:   Head: Normocephalic and atraumatic.   Eyes: EOM are normal. Pupils are equal, round, and reactive to light.   Neck: Normal range of motion. Neck supple.   Cardiovascular: Normal rate, regular rhythm and normal heart sounds.    Pulmonary/Chest: Effort normal and breath sounds normal. No respiratory distress.   Abdominal: Soft. There is no tenderness. There is no rebound and no guarding.   Genitourinary:   Genitourinary Comments: Multiple perianal skin tags and soft fecal impaction.   Musculoskeletal: Normal range of motion. She exhibits no edema (extremities).   Neurological: She is alert and oriented to person, place, and time. She has normal sensation and normal strength.   Skin: Skin is warm and dry. No rash noted. There is erythema (L hip).   Ecchymosis of lower L abd and L groin.  Incision over her hip which has surrounding redness and swelling with serous exudate.    Psychiatric: Mood and affect normal.   Nursing note and vitals reviewed.      LAB RESULTS  Lab Results (last 24 hours)     Procedure Component Value Units Date/Time    CBC & Differential [[id number]] Collected:  09/19/17 1504    Specimen:  Blood Updated:  09/19/17 1531    Narrative:       The following orders were created for panel order CBC & Differential.  Procedure                               Abnormality         Status                     ---------                               -----------         ------                     CBC Auto Differential[[id number]]        Abnormal            Final result                 Please view results for these tests on the individual orders.    Comprehensive Metabolic Panel [[id number]]  (Abnormal) Collected:  09/19/17 1504    Specimen:  Blood Updated:  09/19/17 1535     Glucose 163 (H) mg/dL      BUN 21 mg/dL      Creatinine 1.62 (H) mg/dL      Sodium 137 mmol/L      Potassium  3.5 mmol/L      Chloride 93 (L) mmol/L      CO2 31.6 (H) mmol/L      Calcium 9.5 mg/dL      Total Protein 6.5 g/dL      Albumin 3.60 g/dL      ALT (SGPT) 14 U/L      AST (SGOT) 20 U/L      Alkaline Phosphatase 103 U/L      Total Bilirubin 0.7 mg/dL      eGFR Non African Amer 31 (L) mL/min/1.73      Globulin 2.9 gm/dL      A/G Ratio 1.2 g/dL      BUN/Creatinine Ratio 13.0     Anion Gap 12.4 mmol/L     Narrative:       The MDRD GFR formula is only valid for adults with stable renal function between ages 18 and 70.    Lactic Acid, Plasma [[id number]]  (Normal) Collected:  09/19/17 1504    Specimen:  Blood Updated:  09/19/17 1529     Lactate 1.6 mmol/L     Protime-INR [[id number]]  (Abnormal) Collected:  09/19/17 1504    Specimen:  Blood Updated:  09/19/17 1553     Protime 25.4 (H) Seconds      INR 2.41 (H)    CBC Auto Differential [[id number]]  (Abnormal) Collected:  09/19/17 1504    Specimen:  Blood Updated:  09/19/17 1531     WBC 10.94 (H) 10*3/mm3      RBC 3.50 (L) 10*6/mm3      Hemoglobin 10.0 (L) g/dL      Hematocrit 31.7 (L) %      MCV 90.6 fL      MCH 28.6 pg      MCHC 31.5 (L) g/dL      RDW 15.4 (H) %      RDW-SD 51.4 fl      MPV 9.6 fL      Platelets 218 10*3/mm3      Neutrophil % 78.5 (H) %      Lymphocyte % 9.6 (L) %      Monocyte % 8.6 %      Eosinophil % 2.1 %      Basophil % 0.4 %      Immature Grans % 0.8 (H) %      Neutrophils, Absolute 8.59 (H) 10*3/mm3      Lymphocytes, Absolute 1.05 10*3/mm3      Monocytes, Absolute 0.94 10*3/mm3      Eosinophils, Absolute 0.23 10*3/mm3      Basophils, Absolute 0.04 10*3/mm3      Immature Grans, Absolute 0.09 (H) 10*3/mm3           I ordered the above labs and reviewed the results    PROCEDURES  Procedures    EKG           EKG time: 1535  Rhythm/Rate: nsr, 95  P waves and IN: normal  QRS, axis: normal   ST and T waves: nonspecific ST and T wave changes     Interpreted Contemporaneously by me, independently viewed  unchanged compared to prior 8/27/17      PROGRESS AND  CONSULTS  ED Course     1433 - Lab work ordered for further evaluation.    1444 - Consult placed with A    1525 - Consulted with Dr. Huerta ([facility]) who wants the pt admitted.     1526 - EKG ordered for further evaluation.       MEDICAL DECISION MAKING  Results were reviewed/discussed with the patient and they were also made aware of online access. Pt also made aware that some labs, such as cultures, will not be resulted during ER visit and follow up with PMD is necessary.     MDM  Number of Diagnoses or Management Options     Amount and/or Complexity of Data Reviewed  Clinical lab tests: ordered and reviewed (Protime - 25.4  INR - 2.41  WBC - 10.94)  Tests in the medicine section of CPT®: ordered and reviewed (See note.)  Decide to obtain previous medical records or to obtain history from someone other than the patient: yes  Obtain history from someone other than the patient: yes (Family)  Review and summarize past medical records: yes (open reduction internal fixation of their L hip on 8/22 )  Discuss the patient with other providers: yes (Dr. Hureta ([facility]))           DIAGNOSIS  Final diagnoses:   Surgical wound infection, initial encounter   Fecal impaction       DISPOSITION  ADMISSION    Discussed treatment plan and reason for admission with pt/family and admitting physician.  Pt/family voiced understanding of the plan for admission for further testing/treatment as needed.         Latest Documented Vital Signs:  As of 5:10 PM  BP- 158/98 HR- 106 Temp- 99.3 °F (37.4 °C) (Tympanic) O2 sat- 94%    --  Documentation assistance provided by micha Dejesus for Dr. Watson.  Information recorded by the scrmaria g was done at my direction and has been verified and validated by me.     Temo Dejesus  09/19/17 [id number]       Basil Watson MD  09/19/17 6467

## 2017-09-20 ENCOUNTER — ANESTHESIA EVENT (OUTPATIENT)
Dept: PERIOP | Facility: HOSPITAL | Age: 77
End: 2017-09-20

## 2017-09-20 ENCOUNTER — ANESTHESIA (OUTPATIENT)
Dept: PERIOP | Facility: HOSPITAL | Age: 77
End: 2017-09-20

## 2017-09-20 PROBLEM — N18.30 CHRONIC KIDNEY DISEASE, STAGE 3 (HCC): Status: ACTIVE | Noted: 2017-09-20

## 2017-09-20 PROBLEM — E87.6 HYPOKALEMIA: Status: ACTIVE | Noted: 2017-09-20

## 2017-09-20 LAB
ALBUMIN SERPL-MCNC: 3.3 G/DL (ref 3.5–5.2)
ALBUMIN/GLOB SERPL: 1.1 G/DL
ALP SERPL-CCNC: 100 U/L (ref 39–117)
ALT SERPL W P-5'-P-CCNC: 13 U/L (ref 1–33)
ANION GAP SERPL CALCULATED.3IONS-SCNC: 14.3 MMOL/L
AST SERPL-CCNC: 17 U/L (ref 1–32)
BASOPHILS # BLD AUTO: 0.03 10*3/MM3 (ref 0–0.2)
BASOPHILS NFR BLD AUTO: 0.3 % (ref 0–1.5)
BILIRUB SERPL-MCNC: 0.7 MG/DL (ref 0.1–1.2)
BUN BLD-MCNC: 21 MG/DL (ref 8–23)
BUN/CREAT SERPL: 14.7 (ref 7–25)
CALCIUM SPEC-SCNC: 9.2 MG/DL (ref 8.6–10.5)
CHLORIDE SERPL-SCNC: 95 MMOL/L (ref 98–107)
CO2 SERPL-SCNC: 29.7 MMOL/L (ref 22–29)
CREAT BLD-MCNC: 1.43 MG/DL (ref 0.57–1)
DEPRECATED RDW RBC AUTO: 52.5 FL (ref 37–54)
EOSINOPHIL # BLD AUTO: 0.16 10*3/MM3 (ref 0–0.7)
EOSINOPHIL NFR BLD AUTO: 1.5 % (ref 0.3–6.2)
ERYTHROCYTE [DISTWIDTH] IN BLOOD BY AUTOMATED COUNT: 15.5 % (ref 11.7–13)
GFR SERPL CREATININE-BSD FRML MDRD: 35 ML/MIN/1.73
GLOBULIN UR ELPH-MCNC: 3 GM/DL
GLUCOSE BLD-MCNC: 116 MG/DL (ref 65–99)
GLUCOSE BLDC GLUCOMTR-MCNC: 112 MG/DL (ref 70–130)
GLUCOSE BLDC GLUCOMTR-MCNC: 115 MG/DL (ref 70–130)
GLUCOSE BLDC GLUCOMTR-MCNC: 116 MG/DL (ref 70–130)
GLUCOSE BLDC GLUCOMTR-MCNC: 129 MG/DL (ref 70–130)
HBA1C MFR BLD: 5.63 % (ref 4.8–5.6)
HCT VFR BLD AUTO: 32 % (ref 35.6–45.5)
HGB BLD-MCNC: 10 G/DL (ref 11.9–15.5)
IMM GRANULOCYTES # BLD: 0.04 10*3/MM3 (ref 0–0.03)
IMM GRANULOCYTES NFR BLD: 0.4 % (ref 0–0.5)
INR PPP: 1.78 (ref 0.9–1.1)
INR PPP: 1.81 (ref 0.9–1.1)
INR PPP: 2.46 (ref 0.9–1.1)
LYMPHOCYTES # BLD AUTO: 1.33 10*3/MM3 (ref 0.9–4.8)
LYMPHOCYTES NFR BLD AUTO: 12.8 % (ref 19.6–45.3)
MAGNESIUM SERPL-MCNC: 1.9 MG/DL (ref 1.6–2.4)
MCH RBC QN AUTO: 29 PG (ref 26.9–32)
MCHC RBC AUTO-ENTMCNC: 31.3 G/DL (ref 32.4–36.3)
MCV RBC AUTO: 92.8 FL (ref 80.5–98.2)
MONOCYTES # BLD AUTO: 0.81 10*3/MM3 (ref 0.2–1.2)
MONOCYTES NFR BLD AUTO: 7.8 % (ref 5–12)
NEUTROPHILS # BLD AUTO: 8.02 10*3/MM3 (ref 1.9–8.1)
NEUTROPHILS NFR BLD AUTO: 77.2 % (ref 42.7–76)
PLATELET # BLD AUTO: 186 10*3/MM3 (ref 140–500)
PMV BLD AUTO: 9.4 FL (ref 6–12)
POTASSIUM BLD-SCNC: 3.2 MMOL/L (ref 3.5–5.2)
POTASSIUM BLD-SCNC: 3.9 MMOL/L (ref 3.5–5.2)
PROT SERPL-MCNC: 6.3 G/DL (ref 6–8.5)
PROTHROMBIN TIME: 20.1 SECONDS (ref 11.7–14.2)
PROTHROMBIN TIME: 20.3 SECONDS (ref 11.7–14.2)
PROTHROMBIN TIME: 25.9 SECONDS (ref 11.7–14.2)
RBC # BLD AUTO: 3.45 10*6/MM3 (ref 3.9–5.2)
SODIUM BLD-SCNC: 139 MMOL/L (ref 136–145)
WBC NRBC COR # BLD: 10.39 10*3/MM3 (ref 4.5–10.7)

## 2017-09-20 PROCEDURE — 25010000002 VANCOMYCIN PER 500 MG: Performed by: ORTHOPAEDIC SURGERY

## 2017-09-20 PROCEDURE — 85610 PROTHROMBIN TIME: CPT | Performed by: INTERNAL MEDICINE

## 2017-09-20 PROCEDURE — 25010000002 ONDANSETRON PER 1 MG: Performed by: NURSE ANESTHETIST, CERTIFIED REGISTERED

## 2017-09-20 PROCEDURE — 87077 CULTURE AEROBIC IDENTIFY: CPT | Performed by: ORTHOPAEDIC SURGERY

## 2017-09-20 PROCEDURE — 87205 SMEAR GRAM STAIN: CPT | Performed by: ORTHOPAEDIC SURGERY

## 2017-09-20 PROCEDURE — 25010000002 MIDAZOLAM PER 1 MG: Performed by: ANESTHESIOLOGY

## 2017-09-20 PROCEDURE — 99221 1ST HOSP IP/OBS SF/LOW 40: CPT | Performed by: INTERNAL MEDICINE

## 2017-09-20 PROCEDURE — 36430 TRANSFUSION BLD/BLD COMPNT: CPT

## 2017-09-20 PROCEDURE — 25010000002 NEOSTIGMINE PER 0.5 MG: Performed by: ANESTHESIOLOGY

## 2017-09-20 PROCEDURE — 25010000002 PROPOFOL 10 MG/ML EMULSION: Performed by: NURSE ANESTHETIST, CERTIFIED REGISTERED

## 2017-09-20 PROCEDURE — 85025 COMPLETE CBC W/AUTO DIFF WBC: CPT | Performed by: INTERNAL MEDICINE

## 2017-09-20 PROCEDURE — 93010 ELECTROCARDIOGRAM REPORT: CPT | Performed by: INTERNAL MEDICINE

## 2017-09-20 PROCEDURE — 25010000002 VANCOMYCIN PER 500 MG: Performed by: NURSE ANESTHETIST, CERTIFIED REGISTERED

## 2017-09-20 PROCEDURE — 82962 GLUCOSE BLOOD TEST: CPT

## 2017-09-20 PROCEDURE — 86927 PLASMA FRESH FROZEN: CPT

## 2017-09-20 PROCEDURE — 84132 ASSAY OF SERUM POTASSIUM: CPT | Performed by: INTERNAL MEDICINE

## 2017-09-20 PROCEDURE — 80053 COMPREHEN METABOLIC PANEL: CPT | Performed by: INTERNAL MEDICINE

## 2017-09-20 PROCEDURE — 25810000003 SODIUM CHLORIDE 0.9 % WITH KCL 20 MEQ 20-0.9 MEQ/L-% SOLUTION: Performed by: HOSPITALIST

## 2017-09-20 PROCEDURE — 87186 SC STD MICRODIL/AGAR DIL: CPT | Performed by: ORTHOPAEDIC SURGERY

## 2017-09-20 PROCEDURE — 25010000002 FENTANYL CITRATE (PF) 100 MCG/2ML SOLUTION: Performed by: NURSE ANESTHETIST, CERTIFIED REGISTERED

## 2017-09-20 PROCEDURE — 87075 CULTR BACTERIA EXCEPT BLOOD: CPT | Performed by: ORTHOPAEDIC SURGERY

## 2017-09-20 PROCEDURE — 87070 CULTURE OTHR SPECIMN AEROBIC: CPT | Performed by: ORTHOPAEDIC SURGERY

## 2017-09-20 PROCEDURE — 0S9B00Z DRAINAGE OF LEFT HIP JOINT WITH DRAINAGE DEVICE, OPEN APPROACH: ICD-10-PCS | Performed by: ORTHOPAEDIC SURGERY

## 2017-09-20 PROCEDURE — P9017 PLASMA 1 DONOR FRZ W/IN 8 HR: HCPCS

## 2017-09-20 PROCEDURE — 85610 PROTHROMBIN TIME: CPT | Performed by: ORTHOPAEDIC SURGERY

## 2017-09-20 PROCEDURE — 83036 HEMOGLOBIN GLYCOSYLATED A1C: CPT | Performed by: INTERNAL MEDICINE

## 2017-09-20 PROCEDURE — 25010000002 PIPERACILLIN SOD-TAZOBACTAM PER 1 G: Performed by: ORTHOPAEDIC SURGERY

## 2017-09-20 PROCEDURE — 0SBB0ZZ EXCISION OF LEFT HIP JOINT, OPEN APPROACH: ICD-10-PCS | Performed by: ORTHOPAEDIC SURGERY

## 2017-09-20 PROCEDURE — 87176 TISSUE HOMOGENIZATION CULTR: CPT | Performed by: ORTHOPAEDIC SURGERY

## 2017-09-20 PROCEDURE — 93005 ELECTROCARDIOGRAM TRACING: CPT | Performed by: NURSE PRACTITIONER

## 2017-09-20 PROCEDURE — 83735 ASSAY OF MAGNESIUM: CPT | Performed by: NURSE PRACTITIONER

## 2017-09-20 RX ORDER — FENTANYL CITRATE 50 UG/ML
50 INJECTION, SOLUTION INTRAMUSCULAR; INTRAVENOUS
Status: DISCONTINUED | OUTPATIENT
Start: 2017-09-20 | End: 2017-09-20 | Stop reason: HOSPADM

## 2017-09-20 RX ORDER — EPHEDRINE SULFATE 50 MG/ML
INJECTION, SOLUTION INTRAVENOUS AS NEEDED
Status: DISCONTINUED | OUTPATIENT
Start: 2017-09-20 | End: 2017-09-20 | Stop reason: SURG

## 2017-09-20 RX ORDER — FLUMAZENIL 0.1 MG/ML
0.2 INJECTION INTRAVENOUS AS NEEDED
Status: DISCONTINUED | OUTPATIENT
Start: 2017-09-20 | End: 2017-09-20 | Stop reason: HOSPADM

## 2017-09-20 RX ORDER — NALOXONE HCL 0.4 MG/ML
0.2 VIAL (ML) INJECTION AS NEEDED
Status: DISCONTINUED | OUTPATIENT
Start: 2017-09-20 | End: 2017-09-20 | Stop reason: HOSPADM

## 2017-09-20 RX ORDER — FENTANYL CITRATE 50 UG/ML
INJECTION, SOLUTION INTRAMUSCULAR; INTRAVENOUS AS NEEDED
Status: DISCONTINUED | OUTPATIENT
Start: 2017-09-20 | End: 2017-09-20 | Stop reason: SURG

## 2017-09-20 RX ORDER — EPHEDRINE SULFATE 50 MG/ML
5 INJECTION, SOLUTION INTRAVENOUS ONCE AS NEEDED
Status: DISCONTINUED | OUTPATIENT
Start: 2017-09-20 | End: 2017-09-20 | Stop reason: HOSPADM

## 2017-09-20 RX ORDER — ONDANSETRON 2 MG/ML
4 INJECTION INTRAMUSCULAR; INTRAVENOUS ONCE AS NEEDED
Status: DISCONTINUED | OUTPATIENT
Start: 2017-09-20 | End: 2017-09-20 | Stop reason: HOSPADM

## 2017-09-20 RX ORDER — PROPOFOL 10 MG/ML
VIAL (ML) INTRAVENOUS AS NEEDED
Status: DISCONTINUED | OUTPATIENT
Start: 2017-09-20 | End: 2017-09-20 | Stop reason: SURG

## 2017-09-20 RX ORDER — LABETALOL HYDROCHLORIDE 5 MG/ML
5 INJECTION, SOLUTION INTRAVENOUS
Status: DISCONTINUED | OUTPATIENT
Start: 2017-09-20 | End: 2017-09-20 | Stop reason: HOSPADM

## 2017-09-20 RX ORDER — PROMETHAZINE HYDROCHLORIDE 25 MG/ML
12.5 INJECTION, SOLUTION INTRAMUSCULAR; INTRAVENOUS ONCE AS NEEDED
Status: DISCONTINUED | OUTPATIENT
Start: 2017-09-20 | End: 2017-09-20 | Stop reason: HOSPADM

## 2017-09-20 RX ORDER — POTASSIUM CHLORIDE 750 MG/1
40 CAPSULE, EXTENDED RELEASE ORAL AS NEEDED
Status: DISCONTINUED | OUTPATIENT
Start: 2017-09-20 | End: 2017-09-20

## 2017-09-20 RX ORDER — MIDAZOLAM HYDROCHLORIDE 1 MG/ML
1 INJECTION INTRAMUSCULAR; INTRAVENOUS
Status: DISCONTINUED | OUTPATIENT
Start: 2017-09-20 | End: 2017-09-20 | Stop reason: HOSPADM

## 2017-09-20 RX ORDER — HYDROMORPHONE HYDROCHLORIDE 1 MG/ML
0.5 INJECTION, SOLUTION INTRAMUSCULAR; INTRAVENOUS; SUBCUTANEOUS
Status: DISCONTINUED | OUTPATIENT
Start: 2017-09-20 | End: 2017-09-20 | Stop reason: HOSPADM

## 2017-09-20 RX ORDER — GLYCOPYRROLATE 0.2 MG/ML
INJECTION INTRAMUSCULAR; INTRAVENOUS AS NEEDED
Status: DISCONTINUED | OUTPATIENT
Start: 2017-09-20 | End: 2017-09-20 | Stop reason: SURG

## 2017-09-20 RX ORDER — POTASSIUM CHLORIDE 1.5 G/1.77G
40 POWDER, FOR SOLUTION ORAL AS NEEDED
Status: DISCONTINUED | OUTPATIENT
Start: 2017-09-20 | End: 2017-09-20

## 2017-09-20 RX ORDER — HYDRALAZINE HYDROCHLORIDE 20 MG/ML
5 INJECTION INTRAMUSCULAR; INTRAVENOUS
Status: DISCONTINUED | OUTPATIENT
Start: 2017-09-20 | End: 2017-09-20 | Stop reason: HOSPADM

## 2017-09-20 RX ORDER — ROCURONIUM BROMIDE 10 MG/ML
INJECTION, SOLUTION INTRAVENOUS AS NEEDED
Status: DISCONTINUED | OUTPATIENT
Start: 2017-09-20 | End: 2017-09-20 | Stop reason: SURG

## 2017-09-20 RX ORDER — FENTANYL CITRATE 50 UG/ML
INJECTION, SOLUTION INTRAMUSCULAR; INTRAVENOUS
Status: DISPENSED
Start: 2017-09-20 | End: 2017-09-21

## 2017-09-20 RX ORDER — FAMOTIDINE 10 MG/ML
20 INJECTION, SOLUTION INTRAVENOUS ONCE
Status: COMPLETED | OUTPATIENT
Start: 2017-09-20 | End: 2017-09-20

## 2017-09-20 RX ORDER — MAGNESIUM HYDROXIDE 1200 MG/15ML
LIQUID ORAL AS NEEDED
Status: DISCONTINUED | OUTPATIENT
Start: 2017-09-20 | End: 2017-09-20 | Stop reason: HOSPADM

## 2017-09-20 RX ORDER — SODIUM CHLORIDE AND POTASSIUM CHLORIDE 150; 900 MG/100ML; MG/100ML
75 INJECTION, SOLUTION INTRAVENOUS CONTINUOUS
Status: DISCONTINUED | OUTPATIENT
Start: 2017-09-20 | End: 2017-09-22

## 2017-09-20 RX ORDER — POTASSIUM CHLORIDE 7.45 MG/ML
10 INJECTION INTRAVENOUS
Status: DISCONTINUED | OUTPATIENT
Start: 2017-09-20 | End: 2017-09-20

## 2017-09-20 RX ORDER — DIPHENHYDRAMINE HYDROCHLORIDE 50 MG/ML
12.5 INJECTION INTRAMUSCULAR; INTRAVENOUS
Status: DISCONTINUED | OUTPATIENT
Start: 2017-09-20 | End: 2017-09-20 | Stop reason: HOSPADM

## 2017-09-20 RX ORDER — PROMETHAZINE HYDROCHLORIDE 25 MG/1
25 SUPPOSITORY RECTAL ONCE AS NEEDED
Status: DISCONTINUED | OUTPATIENT
Start: 2017-09-20 | End: 2017-09-20 | Stop reason: HOSPADM

## 2017-09-20 RX ORDER — SODIUM CHLORIDE, SODIUM LACTATE, POTASSIUM CHLORIDE, CALCIUM CHLORIDE 600; 310; 30; 20 MG/100ML; MG/100ML; MG/100ML; MG/100ML
9 INJECTION, SOLUTION INTRAVENOUS CONTINUOUS
Status: DISCONTINUED | OUTPATIENT
Start: 2017-09-20 | End: 2017-09-21

## 2017-09-20 RX ORDER — ONDANSETRON 2 MG/ML
INJECTION INTRAMUSCULAR; INTRAVENOUS AS NEEDED
Status: DISCONTINUED | OUTPATIENT
Start: 2017-09-20 | End: 2017-09-20 | Stop reason: SURG

## 2017-09-20 RX ORDER — LIDOCAINE HYDROCHLORIDE 20 MG/ML
INJECTION, SOLUTION INFILTRATION; PERINEURAL AS NEEDED
Status: DISCONTINUED | OUTPATIENT
Start: 2017-09-20 | End: 2017-09-20 | Stop reason: SURG

## 2017-09-20 RX ORDER — MIDAZOLAM HYDROCHLORIDE 1 MG/ML
2 INJECTION INTRAMUSCULAR; INTRAVENOUS
Status: DISCONTINUED | OUTPATIENT
Start: 2017-09-20 | End: 2017-09-20 | Stop reason: HOSPADM

## 2017-09-20 RX ORDER — PROMETHAZINE HYDROCHLORIDE 25 MG/1
25 TABLET ORAL ONCE AS NEEDED
Status: DISCONTINUED | OUTPATIENT
Start: 2017-09-20 | End: 2017-09-20 | Stop reason: HOSPADM

## 2017-09-20 RX ORDER — SODIUM CHLORIDE 0.9 % (FLUSH) 0.9 %
1-10 SYRINGE (ML) INJECTION AS NEEDED
Status: DISCONTINUED | OUTPATIENT
Start: 2017-09-20 | End: 2017-09-20 | Stop reason: HOSPADM

## 2017-09-20 RX ORDER — POTASSIUM CHLORIDE 750 MG/1
40 CAPSULE, EXTENDED RELEASE ORAL ONCE
Status: COMPLETED | OUTPATIENT
Start: 2017-09-20 | End: 2017-09-20

## 2017-09-20 RX ADMIN — POTASSIUM CHLORIDE AND SODIUM CHLORIDE 75 ML/HR: 900; 150 INJECTION, SOLUTION INTRAVENOUS at 10:07

## 2017-09-20 RX ADMIN — ONDANSETRON 4 MG: 2 INJECTION INTRAMUSCULAR; INTRAVENOUS at 19:20

## 2017-09-20 RX ADMIN — POTASSIUM CHLORIDE 40 MEQ: 750 CAPSULE, EXTENDED RELEASE ORAL at 09:52

## 2017-09-20 RX ADMIN — MIDAZOLAM 1 MG: 1 INJECTION INTRAMUSCULAR; INTRAVENOUS at 18:09

## 2017-09-20 RX ADMIN — FENTANYL CITRATE 100 MCG: 50 INJECTION INTRAMUSCULAR; INTRAVENOUS at 18:27

## 2017-09-20 RX ADMIN — FAMOTIDINE 20 MG: 10 INJECTION, SOLUTION INTRAVENOUS at 18:09

## 2017-09-20 RX ADMIN — EPHEDRINE SULFATE 10 MG: 50 INJECTION INTRAMUSCULAR; INTRAVENOUS; SUBCUTANEOUS at 18:47

## 2017-09-20 RX ADMIN — TAZOBACTAM SODIUM AND PIPERACILLIN SODIUM 3.38 G: 375; 3 INJECTION, SOLUTION INTRAVENOUS at 19:05

## 2017-09-20 RX ADMIN — BISACODYL 10 MG: 10 SUPPOSITORY RECTAL at 10:08

## 2017-09-20 RX ADMIN — VANCOMYCIN HYDROCHLORIDE 1 G: 1 INJECTION, POWDER, LYOPHILIZED, FOR SOLUTION INTRAVENOUS at 18:52

## 2017-09-20 RX ADMIN — LIDOCAINE HYDROCHLORIDE 50 MG: 20 INJECTION, SOLUTION INFILTRATION; PERINEURAL at 18:27

## 2017-09-20 RX ADMIN — SODIUM CHLORIDE 75 ML/HR: 9 INJECTION, SOLUTION INTRAVENOUS at 07:45

## 2017-09-20 RX ADMIN — ROCURONIUM BROMIDE 40 MG: 10 INJECTION INTRAVENOUS at 18:27

## 2017-09-20 RX ADMIN — EPHEDRINE SULFATE 10 MG: 50 INJECTION INTRAMUSCULAR; INTRAVENOUS; SUBCUTANEOUS at 18:59

## 2017-09-20 RX ADMIN — NEOSTIGMINE METHYLSULFATE 4 MG: 1 INJECTION INTRAMUSCULAR; INTRAVENOUS; SUBCUTANEOUS at 19:30

## 2017-09-20 RX ADMIN — EPHEDRINE SULFATE 10 MG: 50 INJECTION INTRAMUSCULAR; INTRAVENOUS; SUBCUTANEOUS at 18:55

## 2017-09-20 RX ADMIN — PROPOFOL 170 MG: 10 INJECTION, EMULSION INTRAVENOUS at 18:27

## 2017-09-20 RX ADMIN — FENTANYL CITRATE 50 MCG: 50 INJECTION INTRAMUSCULAR; INTRAVENOUS at 20:52

## 2017-09-20 RX ADMIN — SODIUM CHLORIDE, POTASSIUM CHLORIDE, SODIUM LACTATE AND CALCIUM CHLORIDE 9 ML/HR: 600; 310; 30; 20 INJECTION, SOLUTION INTRAVENOUS at 18:09

## 2017-09-20 RX ADMIN — GLYCOPYRROLATE 0.6 MG: 0.2 INJECTION INTRAMUSCULAR; INTRAVENOUS at 19:29

## 2017-09-20 RX ADMIN — SERTRALINE 100 MG: 100 TABLET, FILM COATED ORAL at 09:57

## 2017-09-20 RX ADMIN — SODIUM CHLORIDE, POTASSIUM CHLORIDE, SODIUM LACTATE AND CALCIUM CHLORIDE: 600; 310; 30; 20 INJECTION, SOLUTION INTRAVENOUS at 19:30

## 2017-09-20 RX ADMIN — METOPROLOL TARTRATE 25 MG: 25 TABLET ORAL at 13:52

## 2017-09-20 NOTE — PLAN OF CARE
Problem: Patient Care Overview (Adult)  Goal: Plan of Care Review  Outcome: Ongoing (interventions implemented as appropriate)    09/20/17 1801   Coping/Psychosocial Response Interventions   Plan Of Care Reviewed With patient   Patient Care Overview   Progress no change   Outcome Evaluation   Outcome Summary/Follow up Plan preparing for surgery         Problem: Perioperative Period (Adult)  Goal: Signs and Symptoms of Listed Potential Problems Will be Absent or Manageable (Perioperative Period)  Outcome: Ongoing (interventions implemented as appropriate)    09/20/17 1801   Perioperative Period   Problems Assessed (Perioperative Period) pain;infection;situational response   Problems Present (Perioperative Period) infection

## 2017-09-20 NOTE — PLAN OF CARE
Problem: Patient Care Overview (Adult)  Goal: Plan of Care Review  Outcome: Ongoing (interventions implemented as appropriate)    09/20/17 0408   Coping/Psychosocial Response Interventions   Plan Of Care Reviewed With patient   Patient Care Overview   Progress no change   Outcome Evaluation   Outcome Summary/Follow up Plan patient resting through night, pain controlled at this time, enema given per MD order r/t fecal impaction, patient unable to tolerate full enema due to abdominal cramping, did have some solid stool, patient educated on b/p monitoring and glucose monitoring       Goal: Discharge Needs Assessment  Outcome: Ongoing (interventions implemented as appropriate)    Problem: Fall Risk (Adult)  Goal: Identify Related Risk Factors and Signs and Symptoms  Outcome: Outcome(s) achieved Date Met:  09/20/17  Goal: Absence of Falls  Outcome: Ongoing (interventions implemented as appropriate)    Problem: Pain, Acute (Adult)  Goal: Identify Related Risk Factors and Signs and Symptoms  Outcome: Outcome(s) achieved Date Met:  09/20/17  Goal: Acceptable Pain Control/Comfort Level  Outcome: Ongoing (interventions implemented as appropriate)

## 2017-09-20 NOTE — CONSULTS
CONSULT NOTE    Infectious Diseases - Nigel Patel MD  [facility]       Patient Identification:  Name: Jaci Cervantes  Age: 77 y.o.  Sex: female  :  1940  MRN: [id number]             Date of Consultation: 2017       Primary Care Physician: Stephan Felder MD                               Requesting Physician: Dr. Madison  Reason for Consultation: Postoperative wound infection versus infected hematoma    Impression: 77-year-old female admitted on 2017 with worsening drainage and redness at the left hip surgical site few days after removal of the wound VAC with no localizing pain or systemic fever and chills and no worsening of her functional capacity even though she is progressing or improving slowly.  This presentation in the context of wound dehiscence few days after starting of anticoagulation therapy on postoperative day 7 requiring wound VAC placement and now with increasing drainage and redness afterwards is concerning for:  1-probable postop surgical site infection with no systemic sequelae versus bland wound dehiscence due to underlying hematoma  2-status post CVA with underlying atrial fibrillation on chronic anticoagulation therapy  3-status post left intertrochanteric fracture requiring open reduction internal fixation on 2017  4-other diagnosis:   -COPD   -Hypertension   -Diabetes mellitus      Recommendations/Discussions: Distended she does not seem to have any systemic signs and symptoms of sepsis in terms of fever chills altered mental status as well as she does not exhibit any local discomfort or worsening function of her left hip.  The only area of concern is mild redness of the surgical incision and dehiscence and drainage which could very well be due to underlying hematoma and could very well be infection with fastidious pathogen such as coag-negative staph Corynebacterium species and so forth.    Issue is the depth of infection which is not clear at  this time.  Is reasonable not to start any antibiotic therapy at this time as she is not systemically ill and send operative cultures and afterwards start her on vancomycin and zosyn empirically to cover for above-mentioned pathogens until the intraoperative cultures are finalized.  The duration, type and route of antibiotic administration would depend upon operative finding and culture results.  Thank you Dr. Jerry and Dr. Reardon for letting me be the part of your patient care.  Above care plan discussed with patient's family members.        History of Present Illness:   Patient is a 77-year-old female who was hospitalized in [facility] after incurring a fall and developing left intertrochanteric fracture.  Her postoperative course was complicated by stroke requiring anti-cognition therapy for newly diagnosed atrial fibrillation.  On postoperative 7 she started having wound dehiscence since drainage for which she was placed on wound VAC.  She never thought to have any obvious infection and was never started on antibiotic therapy except for the perioperative antibiotics that she received during his last hospitalization and she was on Rocephin for suspected pneumonia.  According to the family member and patient she was doing reasonably well at the rehabilitation facility and was progressing steadily and it slowly until a few days ago when after removal of her wound VAC she started having increasing drainage from the wound area.  She will also noted to have increasing redness in that area.  She denies any fever or denies any chills denies any nausea vomiting or increasing local pain.  Because of this increasing drainage since and spreading erythema at the surgical site patient was sent to the hospital for further evaluation.  Orthopedic surgery services have already evaluated the patient and plan is to do the debridement and evacuation of hematoma and based on the finding consider antibiotic therapy which  is a reasonable thing to do.      Past Medical History:  Past Medical History:   Diagnosis Date   • COPD (chronic obstructive pulmonary disease)    • Diabetes mellitus    • History of transfusion    • Hyperlipidemia    • Hypertension    • Stroke 08/27/2017     Past Surgical History:  Past Surgical History:   Procedure Laterality Date   • HYSTERECTOMY     • AR OPEN FIX INTER/SUBTROCH FX,IMPLNT Left 8/23/2017    Procedure: FEMUR INTRAMEDULLARY NAILING/RODDING;  Surgeon: Marcello Reardon Jr., MD;  Location: [facility];  Service: Orthopedics   • TUBAL ABDOMINAL LIGATION        Home Meds:  Prescriptions Prior to Admission   Medication Sig Dispense Refill Last Dose   • aspirin 325 MG tablet Take 1 tablet by mouth Daily.      • atorvastatin (LIPITOR) 80 MG tablet Take 1 tablet by mouth Every Night.      • lisinopril (PRINIVIL,ZESTRIL) 5 MG tablet Take 2 tablets by mouth Daily.      • nystatin (MYCOSTATIN) 540467 UNIT/GM cream Apply  topically Every 12 (Twelve) Hours.      • oxybutynin XL (DITROPAN-XL) 5 MG 24 hr tablet Take 5 mg by mouth every night at bedtime.   Past Week at Unknown time   • sertraline (ZOLOFT) 100 MG tablet Take 100 mg by mouth Daily.   8/23/2017 at 0800   • warfarin (COUMADIN) 5 MG tablet 5mg po daily        Current Meds:     Current Facility-Administered Medications:   •  acetaminophen (TYLENOL) tablet 650 mg, 650 mg, Oral, Q4H PRN, Harvinder Huerta MD  •  atorvastatin (LIPITOR) tablet 80 mg, 80 mg, Oral, Nightly, Harvinder Huerta MD, 80 mg at 09/19/17 2117  •  bisacodyl (DULCOLAX) suppository 10 mg, 10 mg, Rectal, Daily PRN, Harvinder Huerta MD  •  dextrose (D50W) solution 25 g, 25 g, Intravenous, Q15 Min PRN, Harvinder Huerta MD  •  dextrose (GLUTOSE) oral gel 15 g, 15 g, Oral, Q15 Min PRN, Harvinder Huerta MD  •  glucagon (human recombinant) (GLUCAGEN DIAGNOSTIC) injection 1 mg, 1 mg, Subcutaneous, Q15 Min PRN, Harvinder Huerta MD  •  HYDROcodone-acetaminophen (NORCO) 5-325 MG per tablet  1 tablet, 1 tablet, Oral, Q4H PRN, Harvinder Huerta MD  •  Influenza Vac Subunit Quad (FLUCELVAX) injection 0.5 mL, 0.5 mL, Intramuscular, During Hospitalization, Marcello Reardon Jr., MD  •  insulin aspart (novoLOG) injection 0-9 Units, 0-9 Units, Subcutaneous, 4x Daily With Meals & Nightly, Harvinder Huerta MD, 4 Units at 09/19/17 2119  •  pneumococcal polysaccharide 23-valent (PNEUMOVAX-23) vaccine 0.5 mL, 0.5 mL, Intramuscular, During Hospitalization, Marcello Reardon Jr., MD  •  promethazine (PHENERGAN) tablet 12.5 mg, 12.5 mg, Oral, Q6H PRN **OR** promethazine (PHENERGAN) injection 12.5 mg, 12.5 mg, Intravenous, Q6H PRN **OR** promethazine (PHENERGAN) suppository 12.5 mg, 12.5 mg, Rectal, Q6H PRN, Harvinder Huerta MD  •  sennosides-docusate sodium (SENOKOT-S) 8.6-50 MG tablet 2 tablet, 2 tablet, Oral, Nightly, Harvinder Huerta MD, 2 tablet at 09/19/17 2117  •  sertraline (ZOLOFT) tablet 100 mg, 100 mg, Oral, Daily, Harvinder Huerta MD, 100 mg at 09/19/17 2117  •  sodium chloride 0.9 % flush 1-10 mL, 1-10 mL, Intravenous, PRN, Harvinder Huerta MD  •  sodium chloride 0.9 % infusion, 75 mL/hr, Intravenous, Continuous, Harvinder Huerta MD, Last Rate: 75 mL/hr at 09/20/17 0745, 75 mL/hr at 09/20/17 0745  Allergies:  No Known Allergies  Social History:   Social History   Substance Use Topics   • Smoking status: Never Smoker   • Smokeless tobacco: Never Used   • Alcohol use No      Family History:  History reviewed. No pertinent family history.       Review of Systems  See history of present illness and past medical history.  Patient denies headache, dizziness, syncope, falls, trauma, change in vision, change in hearing, change in taste, changes in weight, changes in appetite, focal weakness, numbness, or paresthesia.  Patient denies chest pain, palpitations, dyspnea, orthopnea, PND, cough, sinus pressure, rhinorrhea, epistaxis, hemoptysis, nausea, vomiting,hematemesis, diarrhea, constipation or hematchezia.   "Denies cold or heat intolerance, polydipsia, polyuria, polyphagia. Denies hematuria, pyuria, dysuria, hesitancy, frequency or urgency. Denies consumption of raw and under cooked meats foods or change in water source.  Denies fever, chills, sweats, night sweats.  Denies missing any routine medications. Remainder of ROS is negative.      Vitals:   /81 (BP Location: Left arm, Patient Position: Lying)  Pulse 86  Temp 97.2 °F (36.2 °C) (Oral)   Resp 16  Ht 68\" (172.7 cm)  Wt 240 lb (109 kg)  SpO2 95%  BMI 36.49 kg/m2  I/O:   Intake/Output Summary (Last 24 hours) at 09/20/17 0850  Last data filed at 09/20/17 0745   Gross per 24 hour   Intake             1000 ml   Output                0 ml   Net             1000 ml     Exam:  General Appearance:    Alert, cooperative, no distress, appears stated age   Head:    Normocephalic, without obvious abnormality, atraumatic   Eyes:    PERRL, conjunctiva/corneas clear, EOM's intact, both eyes   Ears:    Normal external ear canals, both ears   Nose:   Nares normal, septum midline, mucosa normal, no drainage    or sinus tenderness   Throat:   Lips, tongue, gums normal; oral mucosa pink and moist   Neck:   Supple, symmetrical, trachea midline, no adenopathy;     thyroid:  no enlargement/tenderness/nodules; no carotid    bruit or JVD   Back:     Symmetric, no curvature, ROM normal, no CVA tenderness   Lungs:     Clear to auscultation bilaterally, respirations unlabored   Chest Wall:    No tenderness or deformity    Heart:    Regular rate and rhythm, S1 and S2 normal, no murmur, rub   or gallop   Abdomen:     Soft, non-tender, bowel sounds active all four quadrants,     no masses, no hepatomegaly, no splenomegaly   Extremities:   Extremities normal, atraumatic, no cyanosis or edema   Pulses:   Pulses palpable in all extremities; symmetric all extremities   Skin:   Left hip surgical incision appears to be dehisced with erythema around it but no obvious tenderness drainage is " seropurulent.  Range of motion of the left hip is intact.     Neurologic:   CNII-XII intact, motor strength grossly intact, sensation grossly intact to light touch, no focal deficits noted       Data Review:    I reviewed the patient's new clinical results.    Results from last 7 days  Lab Units 09/20/17  0403 09/19/17  1504   WBC 10*3/mm3 10.39 10.94*   HEMOGLOBIN g/dL 10.0* 10.0*   PLATELETS 10*3/mm3 186 218       Results from last 7 days  Lab Units 09/20/17  0403 09/19/17  1504   SODIUM mmol/L 139 137   POTASSIUM mmol/L 3.2* 3.5   CHLORIDE mmol/L 95* 93*   CO2 mmol/L 29.7* 31.6*   BUN mg/dL 21 21   CREATININE mg/dL 1.43* 1.62*   CALCIUM mg/dL 9.2 9.5   GLUCOSE mg/dL 116* 163*     No results found for: CULTURE]    Assessment:  Active Hospital Problems (** Indicates Principal Problem)    Diagnosis Date Noted   • **Surgical wound infection [T81.4XXA] 09/19/2017   • Hypokalemia [E87.6] 09/20/2017   • Chronic kidney disease, stage 3 [N18.3] 09/20/2017   • History of stroke [Z86.73] 09/19/2017   • SVT (supraventricular tachycardia) [I47.1] 09/19/2017   • Type 2 diabetes mellitus with complication [E11.8] 03/04/2016   • COPD (chronic obstructive pulmonary disease) [J44.9] 03/04/2016   • Essential hypertension [I10] 03/04/2016      Resolved Hospital Problems    Diagnosis Date Noted Date Resolved   No resolved problems to display.         Plan:  See above  Nigel Veronica MD   9/20/2017  8:50 AM    Much of this encounter note is an electronic transcription/translation of spoken language to printed text. The electronic translation of spoken language may permit erroneous, or at times, nonsensical words or phrases to be inadvertently transcribed; Although I have reviewed the note for such errors, some may still exist

## 2017-09-20 NOTE — PROGRESS NOTES
"Orthopaedic Surgery  Daily Progress Note    /81 (BP Location: Left arm, Patient Position: Lying)  Pulse 86  Temp 97.2 °F (36.2 °C) (Oral)   Resp 16  Ht 68\" (172.7 cm)  Wt 240 lb (109 kg)  SpO2 95%  BMI 36.49 kg/m2    Lab Results (last 24 hours)     Procedure Component Value Units Date/Time    Lactic Acid, Plasma [[id number]]  (Normal) Collected:  09/19/17 1504    Specimen:  Blood Updated:  09/19/17 1529     Lactate 1.6 mmol/L     CBC & Differential [[id number]] Collected:  09/19/17 1504    Specimen:  Blood Updated:  09/19/17 1531    Narrative:       The following orders were created for panel order CBC & Differential.  Procedure                               Abnormality         Status                     ---------                               -----------         ------                     CBC Auto Differential[[id number]]        Abnormal            Final result                 Please view results for these tests on the individual orders.    CBC Auto Differential [[id number]]  (Abnormal) Collected:  09/19/17 1504    Specimen:  Blood Updated:  09/19/17 1531     WBC 10.94 (H) 10*3/mm3      RBC 3.50 (L) 10*6/mm3      Hemoglobin 10.0 (L) g/dL      Hematocrit 31.7 (L) %      MCV 90.6 fL      MCH 28.6 pg      MCHC 31.5 (L) g/dL      RDW 15.4 (H) %      RDW-SD 51.4 fl      MPV 9.6 fL      Platelets 218 10*3/mm3      Neutrophil % 78.5 (H) %      Lymphocyte % 9.6 (L) %      Monocyte % 8.6 %      Eosinophil % 2.1 %      Basophil % 0.4 %      Immature Grans % 0.8 (H) %      Neutrophils, Absolute 8.59 (H) 10*3/mm3      Lymphocytes, Absolute 1.05 10*3/mm3      Monocytes, Absolute 0.94 10*3/mm3      Eosinophils, Absolute 0.23 10*3/mm3      Basophils, Absolute 0.04 10*3/mm3      Immature Grans, Absolute 0.09 (H) 10*3/mm3     Comprehensive Metabolic Panel [[id number]]  (Abnormal) Collected:  09/19/17 1504    Specimen:  Blood Updated:  09/19/17 1535     Glucose 163 (H) mg/dL      BUN 21 mg/dL      Creatinine 1.62 (H) mg/dL  "     Sodium 137 mmol/L      Potassium 3.5 mmol/L      Chloride 93 (L) mmol/L      CO2 31.6 (H) mmol/L      Calcium 9.5 mg/dL      Total Protein 6.5 g/dL      Albumin 3.60 g/dL      ALT (SGPT) 14 U/L      AST (SGOT) 20 U/L      Alkaline Phosphatase 103 U/L      Total Bilirubin 0.7 mg/dL      eGFR Non African Amer 31 (L) mL/min/1.73      Globulin 2.9 gm/dL      A/G Ratio 1.2 g/dL      BUN/Creatinine Ratio 13.0     Anion Gap 12.4 mmol/L     Narrative:       The MDRD GFR formula is only valid for adults with stable renal function between ages 18 and 70.    Protime-INR [[id number]]  (Abnormal) Collected:  09/19/17 1504    Specimen:  Blood Updated:  09/19/17 1553     Protime 25.4 (H) Seconds      INR 2.41 (H)    C-reactive Protein [[id number]]  (Abnormal) Collected:  09/19/17 1824    Specimen:  Blood Updated:  09/19/17 1902     C-Reactive Protein 1.05 (H) mg/dL     Sedimentation Rate [[id number]]  (Abnormal) Collected:  09/19/17 1824    Specimen:  Blood Updated:  09/19/17 1919     Sed Rate 47 (H) mm/hr     POC Glucose Fingerstick [[id number]]  (Abnormal) Collected:  09/19/17 2035    Specimen:  Blood Updated:  09/19/17 2036     Glucose 212 (H) mg/dL     Narrative:       Meter: [id number] : [id number] Colin Badillo CNA    Blood Culture [[id number]] Collected:  09/19/17 1824    Specimen:  Blood from Arm, Left Updated:  09/19/17 2104    CBC Auto Differential [[id number]]  (Abnormal) Collected:  09/20/17 0403    Specimen:  Blood Updated:  09/20/17 0440     WBC 10.39 10*3/mm3      RBC 3.45 (L) 10*6/mm3      Hemoglobin 10.0 (L) g/dL      Hematocrit 32.0 (L) %      MCV 92.8 fL      MCH 29.0 pg      MCHC 31.3 (L) g/dL      RDW 15.5 (H) %      RDW-SD 52.5 fl      MPV 9.4 fL      Platelets 186 10*3/mm3      Neutrophil % 77.2 (H) %      Lymphocyte % 12.8 (L) %      Monocyte % 7.8 %      Eosinophil % 1.5 %      Basophil % 0.3 %      Immature Grans % 0.4 %      Neutrophils, Absolute 8.02 10*3/mm3      Lymphocytes, Absolute 1.33 10*3/mm3       Monocytes, Absolute 0.81 10*3/mm3      Eosinophils, Absolute 0.16 10*3/mm3      Basophils, Absolute 0.03 10*3/mm3      Immature Grans, Absolute 0.04 (H) 10*3/mm3     Protime-INR [[id number]]  (Abnormal) Collected:  09/20/17 0403    Specimen:  Blood Updated:  09/20/17 0449     Protime 25.9 (H) Seconds      INR 2.46 (H)    Hemoglobin A1c [[id number]]  (Abnormal) Collected:  09/20/17 0403    Specimen:  Blood Updated:  09/20/17 0449     Hemoglobin A1C 5.63 (H) %     Narrative:       Hemoglobin A1C Ranges:    Increased Risk for Diabetes  5.7% to 6.4%  Diabetes                     >= 6.5%  Diabetic Goal                < 7.0%    Comprehensive Metabolic Panel [[id number]]  (Abnormal) Collected:  09/20/17 0403    Specimen:  Blood Updated:  09/20/17 0532     Glucose 116 (H) mg/dL      BUN 21 mg/dL      Creatinine 1.43 (H) mg/dL      Sodium 139 mmol/L      Potassium 3.2 (L) mmol/L      Chloride 95 (L) mmol/L      CO2 29.7 (H) mmol/L      Calcium 9.2 mg/dL      Total Protein 6.3 g/dL      Albumin 3.30 (L) g/dL      ALT (SGPT) 13 U/L      AST (SGOT) 17 U/L      Alkaline Phosphatase 100 U/L      Total Bilirubin 0.7 mg/dL      eGFR Non African Amer 35 (L) mL/min/1.73      Globulin 3.0 gm/dL      A/G Ratio 1.1 g/dL      BUN/Creatinine Ratio 14.7     Anion Gap 14.3 mmol/L     Narrative:       The MDRD GFR formula is only valid for adults with stable renal function between ages 18 and 70.    POC Glucose Fingerstick [[id number]]  (Normal) Collected:  09/20/17 0540    Specimen:  Blood Updated:  09/20/17 0542     Glucose 115 mg/dL     Narrative:       Meter: [id number] : [id number] Colin Badillo CNA    Blood Culture [[id number]]  (Normal) Collected:  09/19/17 1758    Specimen:  Blood from Arm, Right Updated:  09/20/17 0702     Blood Culture No growth at less than 24 hours          Imaging Results (last 24 hours)     Procedure Component Value Units Date/Time    XR Abdomen KUB [[id number]] Collected:  09/19/17 1850     Updated:  09/19/17  1904    Narrative:       ONE VIEW PORTABLE ABDOMEN     HISTORY: Abdominal pain. Constipation.     There is a moderate amount of stool mixed with dense residual barium in  the rectal ampulla combined with a moderate amount of air in the  remainder the colon and this may relate to an element of mild to  moderate fecal impaction.     This report was finalized on 9/19/2017 7:01 PM by Dr. Дмитрий Llanes MD.       CT Lower Extremity Left Without Contrast [[id number]] Collected:  09/19/17 1933     Updated:  09/19/17 2000    Narrative:       CT SCAN OF THE LEFT HIP AND PELVIS     HISTORY: Recent internal fixation of left intertrochanteric fracture.  Evaluate for hematoma. Pain.     TECHNIQUE:  Radiation dose reduction techniques were utilized, including  automated exposure control and exposure modulation based on body size.   The CT scan was performed through the pelvis and both hips and proximal  femurs with thin axial images followed by coronal and sagittal  reconstructions. The following findings are present:     FINDINGS:   1. There has been internal fixation of the intertrochanteric fracture of  the left proximal femur with an intramedullary vladimir and intersecting  screw and the alignment appears satisfactory. There are several small  fracture fragments near the lesser trochanter and at the site of vladimir  insertion at the greater trochanter. There is a small amount of adjacent  hematoma and effacement of the subcutaneous soft tissues near the  operative site. There is no large discrete hematoma present. There is no  deep hematoma within the pelvis.  2. There is moderate distention of the urinary bladder extending to the  level of the sacral promontory. The urinary bladder has a smooth  contour. There is a large amount of stool within the rectal ampulla  suggesting an element of fecal impaction.  3. The remainder of the bony pelvis is unremarkable. No other fractures  are seen.     This report was finalized on 9/19/2017 7:57  PM by Dr. Дмитрий Llanes MD.             Patient Care Team:  Stephan Felder MD as PCP - General  Stephan Felder MD as PCP - Family Medicine    SUBJECTIVE  Patient reports mild pain in left hip    PHYSICAL EXAM  Resting in NAD  Proximal hip incision with mild maceration, no gross dehiscence, but mild induration.  Can express moderate amount of brownish/yellow fluid.  No significant erythema.    More distal hip incision appears slightly macerated but no dehiscence, no drainage.    Distal interlock incision well healed    Active ankle DF and PF  Reports sensation intact s/s/dp/sp/t  Foot warm, perfused, palpable DP     Principal Problem:    Surgical wound infection  Active Problems:    COPD (chronic obstructive pulmonary disease)    Essential hypertension    Type 2 diabetes mellitus with complication    Renal insufficiency    History of stroke    SVT (supraventricular tachycardia)      PLAN / DISPOSITION:  76 y/o female with multiple medical comorbidities ~4 weeks s/p IMN left intertrochanteric hip fracture with postop course complicated by stroke, wound drainage related to full-dose antiocagulation, and now suspected infected hematoma.    WBC mildly elevated, but afebrile, no sign of sepsis; antibiotics currently held by Medicine which I agree is appropriate.  CT shows small hematoma/fluid collection.    Plan for irrigation and debridement of left hip this afternoon.  Patient's INR is currently 2.5; will discuss with medicine to see if FFP possible to try to get INR at least <1.5 prior to surgery.  Will recheck INR at 3pm.    Patient does understand she will be at perioperative risk of recurrent stroke given temporary cessation of anticoagulation.  She also understands the risk of recurrent hematoma given that she will have to resume anticoagulation postop.    The risks/benefits of surgery, including pain, infection, wound healing problems, need for future procedures, stroke, DVT/PE, cardiac event, and/or  death were discussed, and the patient elected to proceed with surgery.       1. Pain control: Orals  2.  Antibiotics: Continue to hold; infectious disease consultation pending, will obtain deep cultures at surgery to tailor antibiotics  3.  PT: WBAT  4. DVT: hold Coumadin; CAIT/SCDs  5. Dispo: pending    Marcello Reardon Jr, MD  09/20/17  7:53 AM

## 2017-09-20 NOTE — DISCHARGE PLACEMENT REQUEST
"Zahra Cervantes (77 y.o. Female)     Date of Birth Social Security Number Address Home Phone MRN    1940  [address] [place] [address] [phone] [id number]    Latter day Marital Status          None        Admission Date Admission Type Admitting Provider Attending Provider Department, Room/Bed    9/19/17 Emergency Deanna, MD Ang Leger Jonathan, MD [address], P895/1    Discharge Date Discharge Disposition Discharge Destination                      Attending Provider: Matt Fry MD     Allergies:  No Known Allergies    Isolation:  None   Infection:  None   Code Status:  FULL    Ht:  68\" (172.7 cm)   Wt:  240 lb (109 kg)    Admission Cmt:  None   Principal Problem:  Surgical wound infection [T81.4XXA]                 Active Insurance as of 9/19/2017     Primary Coverage     Payor Plan Insurance Group Employer/Plan Group    HUMANA MEDICARE REPLACEMENT HUMANA MEDICARE REPL [id number]     Payor Plan Address Payor Plan Phone Number Effective From Effective To    [address] [address] [phone] 1/1/2013     [place] [address]       Subscriber Name Subscriber Birth Date Member ID       ZAHRA CERVANTES 1940 [id number]                 Emergency Contacts      (Rel.) Home Phone Work Phone Mobile Phone    HelenJosep (Son) [phone] -- --    Aimee Maza (Daughter) [phone] -- --    HelenSung capellan (Son) [phone] -- --              "

## 2017-09-20 NOTE — PLAN OF CARE
Problem: Hypertensive Disease/Crisis (Arterial) (Adult)  Goal: Signs and Symptoms of Listed Potential Problems Will be Absent or Manageable (Hypertensive Disease/Crisis)  Outcome: Outcome(s) achieved Date Met:  09/19/17

## 2017-09-20 NOTE — ANESTHESIA PROCEDURE NOTES
Airway  Urgency: elective    Airway not difficult    General Information and Staff    Patient location during procedure: OR  CRNA: ARISTIDES GTZ    Indications and Patient Condition  Indications for airway management: airway protection    Preoxygenated: yes  Mask difficulty assessment: 1 - vent by mask    Final Airway Details  Final airway type: endotracheal airway      Successful airway: ETT  Cuffed: yes   Successful intubation technique: direct laryngoscopy  Endotracheal tube insertion site: oral  Blade: Wilfrido  Blade size: #3  ETT size: 7.0 mm  Cormack-Lehane Classification: grade I - full view of glottis  Placement verified by: chest auscultation and capnometry   Measured from: lips  ETT to lips (cm): 21  Number of attempts at approach: 1    Additional Comments   ett cuff up at [facility]

## 2017-09-20 NOTE — PROGRESS NOTES
Name: Jaci Cervantes ADMIT: 2017   : 1940  PCP: Stephan Felder MD    MRN: [id number] LOS: 1 days   AGE/SEX: 77 y.o. female  ROOM: [facility]   Subjective   Subjective  No new complaints.  Small soft BMs yesterday.  Family states no good BM in 2 weeks.  Occasional abdominal cramping.  No vomiting.    Objective   Vital Signs  Temp:  [97 °F (36.1 °C)-99.3 °F (37.4 °C)] 97.2 °F (36.2 °C)  Heart Rate:  [] 86  Resp:  [16-23] 16  BP: (144-185)/(80-98) 159/81  SpO2:  [94 %-98 %] 95 %  on  Flow (L/min):  [2] 2;   O2 Device: nasal cannula  Body mass index is 36.49 kg/(m^2).    Physical Exam   Constitutional: She is oriented to person, place, and time. She appears well-developed. No distress.   Neck: No JVD present. No tracheal deviation present. No thyromegaly present.   Cardiovascular: Normal rate and regular rhythm.    No murmur heard.  Pulmonary/Chest: Effort normal and breath sounds normal. No respiratory distress.   Abdominal: Soft. Normal appearance and bowel sounds are normal. She exhibits no distension. There is no tenderness.   Musculoskeletal: Normal range of motion. She exhibits no edema.   Neurological: She is alert and oriented to person, place, and time.   Skin: Skin is warm and dry. No rash noted. She is not diaphoretic.   Left hip wound dressed, purulent drainage noted.   Nursing note and vitals reviewed.      Results Review:       I reviewed the patient's new clinical results.    Results from last 7 days  Lab Units 17  0403 17  1504   WBC 10*3/mm3 10.39 10.94*   HEMOGLOBIN g/dL 10.0* 10.0*   PLATELETS 10*3/mm3 186 218     Results from last 7 days  Lab Units 17  0403 17  1504   SODIUM mmol/L 139 137   POTASSIUM mmol/L 3.2* 3.5   CHLORIDE mmol/L 95* 93*   CO2 mmol/L 29.7* 31.6*   BUN mg/dL 21 21   CREATININE mg/dL 1.43* 1.62*   GLUCOSE mg/dL 116* 163*   Estimated Creatinine Clearance: 41.2 mL/min (by C-G formula based on Cr of 1.43).  Results from last 7  days  Lab Units 09/20/17  0403 09/19/17  1504   CALCIUM mg/dL 9.2 9.5   ALBUMIN g/dL 3.30* 3.60       Results from last 7 days  Lab Units 09/19/17  1758   BLOODCX  No growth at less than 24 hours       Results from last 7 days  Lab Units 09/20/17  0403 09/19/17  1504   PROTIME Seconds 25.9* 25.4*   INR  2.46* 2.41*     Hemoglobin A1C   Date/Time Value Ref Range Status   09/20/2017 0403 5.63 (H) 4.80 - 5.60 % Final     Glucose   Date/Time Value Ref Range Status   09/20/2017 0540 115 70 - 130 mg/dL Final   09/19/2017 2035 212 (H) 70 - 130 mg/dL Final       CT SCAN OF THE LEFT HIP AND PELVIS  09/19/17  1. There has been internal fixation of the intertrochanteric fracture of  the left proximal femur with an intramedullary vladimir and intersecting  screw and the alignment appears satisfactory. There are several small  fracture fragments near the lesser trochanter and at the site of vladimir  insertion at the greater trochanter. There is a small amount of adjacent  hematoma and effacement of the subcutaneous soft tissues near the  operative site. There is no large discrete hematoma present. There is no  deep hematoma within the pelvis.  2. There is moderate distention of the urinary bladder extending to the  level of the sacral promontory. The urinary bladder has a smooth  contour. There is a large amount of stool within the rectal ampulla  suggesting an element of fecal impaction.  3. The remainder of the bony pelvis is unremarkable. No other fractures  are seen.      atorvastatin 80 mg Oral Nightly   insulin aspart 0-9 Units Subcutaneous 4x Daily With Meals & Nightly   sennosides-docusate sodium 2 tablet Oral Nightly   sertraline 100 mg Oral Daily       sodium chloride 75 mL/hr Last Rate: 75 mL/hr (09/20/17 0745)   NPO Diet Ice Chips, Sips With Meds      Assessment/Plan   Assessment:     Active Hospital Problems (** Indicates Principal Problem)    Diagnosis Date Noted   • **Surgical wound infection [T81.4XXA] 09/19/2017   •  Hypokalemia [E87.6] 09/20/2017   • Chronic kidney disease, stage 3 [N18.3] 09/20/2017   • History of stroke [Z86.73] 09/19/2017   • SVT (supraventricular tachycardia) [I47.1] 09/19/2017   • Type 2 diabetes mellitus with complication [E11.8] 03/04/2016   • COPD (chronic obstructive pulmonary disease) [J44.9] 03/04/2016   • Essential hypertension [I10] 03/04/2016      Resolved Hospital Problems    Diagnosis Date Noted Date Resolved   No resolved problems to display.       Plan:   · ID and ortho have been consulted.  Planning OR later today.  Cultures to be obtained intraoperatively.  Holding on antibiotics for now.  · Cardiology to evaluate and clear for surgery.  Previous Zio patch result reviewed.  · Will need to reverse coagulopathy prior to surgery.  Will order 2 units FFP.  · Replace potassium.  Monitor renal function.  Probable CKD3.  · Fecal impaction noted on abdominal x-ray.  Advised nurse to administer suppository and we will proceed with warm water and mineral oil enema if no relief with suppository.  Will need to be on good bowel regimen postoperatively.  No signs of obstruction.  · Discussed with family at bedside.      Matt Fry MD  [facility]  09/20/17  8:32 AM

## 2017-09-20 NOTE — PROGRESS NOTES
Discharge Planning Assessment  [facility]     Patient Name: Jaci Cervantes  MRN: [id number]  Today's Date: 9/20/2017    Admit Date: 9/19/2017          Discharge Needs Assessment       09/20/17 1422    Living Environment    Lives With facility resident   Is from rehab at [facility] right now. Lives on campus in assisted living normally.    Living Arrangements assisted living;extended care facility   Is doing rehab at [facility] but lives at assiste living on same campus.     Home Accessibility no concerns    Stair Railings at Home none    Type of Financial/Environmental Concern none    Transportation Available ambulance;car;family or friend will provide    Living Environment    Provides Primary Care For no one    Quality Of Family Relationships supportive;helpful;involved    Able to Return to Prior Living Arrangements yes    Discharge Needs Assessment    Concerns To Be Addressed basic needs concerns;discharge planning concerns;home safety concerns    Concerns Comments Plans return to [facility]. Called to [facility] to inform of admission and plan to return there. Left vmm.    Readmission Within The Last 30 Days no previous admission in last 30 days    Outpatient/Agency/Support Group Needs skilled nursing facility (specify);assisted living facility (specify)    Community Agency Name(S) [facility]. [facility]isted [facility] at [place].    Anticipated Changes Related to Illness inability to care for self    Equipment Currently Used at Home hospital bed;wheelchair;walker, rolling    Equipment Needed After Discharge none    Discharge Facility/Level Of Care Needs nursing facility, skilled    Current Discharge Risk lives alone;chronically ill;dependent with mobility/activities of daily living;physical impairment    Discharge Disposition still a patient    Discharge Contact Information if Applicable Josep Cervantes son [phone], Aimee Morrison melodie [phone] and Virginia sewell in law [phone]     Discharge Planning Comments [facility]            Discharge Plan       09/20/17 1445    Case Management/Social Work Plan    Plan [facility]    Patient/Family In Agreement With Plan yes    Additional Comments Confirmed face sheet correct. IMM 9/19/17        Discharge Placement     Facility/Agency Request Status Selected? Address Phone Number Fax Number    [facility] Accepted    Yes [address] [address] [phone]        Diana Barry RN 9/20/2017 14:48    Called to [facility].                           Demographic Summary       09/20/17 1445    Referral Information    Admission Type inpatient    Arrived From admitted as an inpatient    Referral Source admission list    Reason For Consult discharge planning    Record Reviewed clinical discipline documentation;history and physical;medical record    Contact Information    Permission Granted to Share Information With     Primary Care Physician Information    Name Dr. Stephan Felder            Functional Status       09/20/17 1446    Functional Status Current    Ambulation 3-->assistive equipment and person    Transferring 3-->assistive equipment and person    Toileting 3-->assistive equipment and person    Bathing 2-->assistive person    Dressing 2-->assistive person    Eating 0-->independent    Communication 0-->understands/communicates without difficulty    Change in Functional Status Since Onset of Current Illness/Injury yes    Functional Status Prior    Ambulation 3-->assistive equipment and person    Transferring 3-->assistive equipment and person    Toileting 3-->assistive equipment and person    Bathing 2-->assistive person    Dressing 2-->assistive person    Eating 0-->independent    Communication 0-->understands/communicates without difficulty    Swallowing 0-->swallows foods/liquids without difficulty    Activity Tolerance    Usual Activity Tolerance moderate     Current Activity Tolerance fair            Psychosocial     None            Abuse/Neglect     None            Legal     None            Substance Abuse     None            Patient Forms     None          Diana Barry RN  Continued Stay Note  [facility]     Patient Name: Jaci Cervantes  MRN: [id number]  Today's Date: 9/20/2017    Admit Date: 9/19/2017          Discharge Plan       09/20/17 1445    Case Management/Social Work Plan    Plan [facility] skilled [facility]    Patient/Family In Agreement With Plan yes    Additional Comments Confirmed face sheet correct. IMM 9/19/17              Discharge Codes     None            Diana Barry RN

## 2017-09-20 NOTE — ANESTHESIA PREPROCEDURE EVALUATION
Anesthesia Evaluation     NPO Solid Status: > 6 hours  NPO Liquid Status: > 6 hours     Airway   Mallampati: I  TM distance: >3 FB  Neck ROM: full  Dental - normal exam     Pulmonary     breath sounds clear to auscultation  (+) pneumonia , COPD,   Cardiovascular     Rhythm: regular  Rate: normal    (+) hypertension, hyperlipidemia  (-) past MI      Neuro/Psych  (+) CVA (no residual), psychiatric history,    GI/Hepatic/Renal/Endo    (+)  GERD, diabetes mellitus,     Musculoskeletal     Abdominal    Substance History      OB/GYN          Other                                        Anesthesia Plan    ASA 3     general     Anesthetic plan and risks discussed with patient.

## 2017-09-20 NOTE — PLAN OF CARE
Problem: Patient Care Overview (Adult)  Goal: Plan of Care Review  Outcome: Ongoing (interventions implemented as appropriate)    09/19/17 2111   Coping/Psychosocial Response Interventions   Plan Of Care Reviewed With patient;daughter   Patient Care Overview   Progress progress toward functional goals as expected   Outcome Evaluation   Outcome Summary/Follow up Plan Patient arrived late during shift, had small bm, had KUB done, off floor for CT, LLE (Hip) reddened and swollen. Patient possible fecal impaction. DTR at Bed Side. Skin bruising noted. No pressure ulcers, but noted by family that she has had an ulcer and on buttocks region, but is now healed. All skin in coccyx, buttocks region intact, placed barrier cream. Lt. hip very red, bruised, and edemamous with drainage form proximal incision. Educated her on her DM and that we would be checking her BG to ensure proper glucose control.        Goal: Adult Individualization and Mutuality  Outcome: Ongoing (interventions implemented as appropriate)    Problem: Fall Risk (Adult)  Goal: Identify Related Risk Factors and Signs and Symptoms  Outcome: Ongoing (interventions implemented as appropriate)  Goal: Absence of Falls  Outcome: Ongoing (interventions implemented as appropriate)    Problem: Pain, Acute (Adult)  Goal: Identify Related Risk Factors and Signs and Symptoms  Outcome: Ongoing (interventions implemented as appropriate)  Goal: Acceptable Pain Control/Comfort Level  Outcome: Ongoing (interventions implemented as appropriate)

## 2017-09-20 NOTE — DISCHARGE INSTR - PHARMACY
Confirmed with patient, she uses [facility] at [facility] and [address]

## 2017-09-20 NOTE — CONSULTS
[facility]  Cardiology Consult Note    Patient Identification:  Name: Jaci Cervantes  Age: 77 y.o.  Sex: female  :  1940  MRN: [id number]             Requesting Physician:   Dr. Huerta    Reason for Consultation / Chief Complaint: Pre op clearance    History of Present Illness:   78 y/o with history of left ORIF 17 with subsequent CVA in 2017.  She had been at rehab post operatively and is able to ambulate just a few steps.  She was seen as outpt in ortho office as surgery follow up and found to have tissues swelling and drainage after wound vac was removed.  She denies any shob, chest pain, near syncope, syncope, or palpitations.  She did have  Holter monitor and zios patch warn for suspected Afib post discharge due recent CVA.  Noted Non sustained SVT and non sustained VT on zios with underlying SR.  She has been on coumadin for CVA prophylaxis.  Daughter preston cabezas is at bedside and provides majority of history as patient is sleepy from pain meds.  We have been asked to provide Cardiac pre op risk assessment.  Echo 17 - EF 58% with neg saline contrast study with mild concentric LVH and grade 1 diastolic dysfunction.  MRI angio of neck and head 17 revealed multiple bilateral punctate acute infarcts with no hemodynamicly signficant stenosis of neck or head arteries.      Comorbid cardiac risk factors: DM, HTN, HLD, CVA    Past Medical History:  Past Medical History:   Diagnosis Date   • COPD (chronic obstructive pulmonary disease)    • Diabetes mellitus    • History of transfusion    • Hyperlipidemia    • Hypertension    • Stroke 2017     Past Surgical History:  Past Surgical History:   Procedure Laterality Date   • HYSTERECTOMY     • TX OPEN FIX INTER/SUBTROCH FX,IMPLNT Left 2017    Procedure: FEMUR INTRAMEDULLARY NAILING/RODDING;  Surgeon: Marcello Reardon Jr., MD;  Location: [facility] OR;  Service: Orthopedics   • TUBAL ABDOMINAL LIGATION        Allergies:  No  Known Allergies     Home Meds:  Prescriptions Prior to Admission   Medication Sig Dispense Refill Last Dose   • aspirin 325 MG tablet Take 1 tablet by mouth Daily.      • atorvastatin (LIPITOR) 80 MG tablet Take 1 tablet by mouth Every Night.      • lisinopril (PRINIVIL,ZESTRIL) 5 MG tablet Take 2 tablets by mouth Daily.      • nystatin (MYCOSTATIN) 238331 UNIT/GM cream Apply  topically Every 12 (Twelve) Hours.      • oxybutynin XL (DITROPAN-XL) 5 MG 24 hr tablet Take 5 mg by mouth every night at bedtime.   Past Week at Unknown time   • sertraline (ZOLOFT) 100 MG tablet Take 100 mg by mouth Daily.   8/23/2017 at 0800   • warfarin (COUMADIN) 5 MG tablet 5mg po daily        Current Meds:     Current Facility-Administered Medications:   •  acetaminophen (TYLENOL) tablet 650 mg, 650 mg, Oral, Q4H PRN, Harvinder Huerta MD  •  atorvastatin (LIPITOR) tablet 80 mg, 80 mg, Oral, Nightly, Harvinder Huerta MD, 80 mg at 09/19/17 2117  •  bisacodyl (DULCOLAX) suppository 10 mg, 10 mg, Rectal, Daily PRN, Harvinder Huerta MD  •  dextrose (D50W) solution 25 g, 25 g, Intravenous, Q15 Min PRN, Harvinder Huerta MD  •  dextrose (GLUTOSE) oral gel 15 g, 15 g, Oral, Q15 Min PRN, Harvinder Huerta MD  •  glucagon (human recombinant) (GLUCAGEN DIAGNOSTIC) injection 1 mg, 1 mg, Subcutaneous, Q15 Min PRN, Harvinder Huerta MD  •  HYDROcodone-acetaminophen (NORCO) 5-325 MG per tablet 1 tablet, 1 tablet, Oral, Q4H PRN, Harvinder Huerta MD  •  Influenza Vac Subunit Quad (FLUCELVAX) injection 0.5 mL, 0.5 mL, Intramuscular, During Hospitalization, Marcello Reardon Jr., MD  •  insulin aspart (novoLOG) injection 0-9 Units, 0-9 Units, Subcutaneous, 4x Daily With Meals & Nightly, Harvinder Huerat MD, 4 Units at 09/19/17 2119  •  pneumococcal polysaccharide 23-valent (PNEUMOVAX-23) vaccine 0.5 mL, 0.5 mL, Intramuscular, During Hospitalization, Marcello Reardon Jr., MD  •  potassium chloride (MICRO-K) CR capsule 40 mEq, 40 mEq, Oral, PRN **OR**  potassium chloride (KLOR-CON) packet 40 mEq, 40 mEq, Oral, PRN **OR** potassium chloride 10 mEq in 100 mL IVPB, 10 mEq, Intravenous, Q1H PRN, Matt Fry MD  •  promethazine (PHENERGAN) tablet 12.5 mg, 12.5 mg, Oral, Q6H PRN **OR** promethazine (PHENERGAN) injection 12.5 mg, 12.5 mg, Intravenous, Q6H PRN **OR** promethazine (PHENERGAN) suppository 12.5 mg, 12.5 mg, Rectal, Q6H PRN, Harvinder Huerta MD  •  sennosides-docusate sodium (SENOKOT-S) 8.6-50 MG tablet 2 tablet, 2 tablet, Oral, Nightly, Harvinder Huerta MD, 2 tablet at 09/19/17 2117  •  sertraline (ZOLOFT) tablet 100 mg, 100 mg, Oral, Daily, Harvinder Huerta MD, 100 mg at 09/19/17 2117  •  sodium chloride 0.9 % flush 1-10 mL, 1-10 mL, Intravenous, PRN, Harvinder Huerta MD  •  sodium chloride 0.9 % infusion, 75 mL/hr, Intravenous, Continuous, Harvinder Huerta MD, Last Rate: 75 mL/hr at 09/20/17 0745, 75 mL/hr at 09/20/17 0745      Social History:   Social History   Substance Use Topics   • Smoking status: Never Smoker   • Smokeless tobacco: Never Used   • Alcohol use No      Family History:  History reviewed. No pertinent family history.     Review of Systems    Constitutional: + weakness; no fatigue, fever, rigors, chills   Eyes: No vision changes, eye pain   ENT/oropharynx: No difficulty swallowing, sore throat, epistaxis, changes in hearing   Cardiovascular: No chest pain, chest tightness, palpitations, paroxysmal nocturnal dyspnea, orthopnea, diaphoresis, dizziness / syncopal episode   Respiratory: Mild shortness of breath, dyspnea on exertion as baseline with chronic o2 use at 2 liters: denies cough, wheezing hemoptysis   Gastrointestinal: No abdominal pain, nausea, vomiting, diarrhea, bloody stools; + constipation.    Genitourinary: No hematuria, dysuria   Neurological: No headache, tremors, numbness,  one-sided weakness    Musculoskeletal: No cramps, myalgias; + left hip and bilateral   joint pain;    Integument: No rash, edema        Constitutional:  Temp:  [97 °F (36.1 °C)-99.3 °F (37.4 °C)] 97.2 °F (36.2 °C)  Heart Rate:  [] 86  Resp:  [16-23] 16  BP: (144-185)/(80-98) 159/81    Physical Exam by  Dr. Martinez  General:  Appears in no acute distress, sleepy but is able to converse.  Eyes: PERTL,  HEENT:  No JVD. Thyroid not visibly enlarged. No mucosal pallor or cyanosis  Respiratory: Respirations regular and unlabored at rest. Equal chest expansion. BBS with good air entry in all fields. No crackles, rubs or wheezes auscultated  Cardiovascular: S1S2 Regular rate and rhythm. No murmur, rub or gallop auscultated. No carotid bruits. DP/PT pulses 2+   . No pretibial pitting edema  Gastrointestinal: Abdomen soft, flat, non tender. Bowel sounds present. No hepatosplenomegaly. No ascites  Musculoskeletal: CAMARA x4. No abnormal movements  Extremities: No digital clubbing or cyanosis  Skin: Color pink. Skin warm and dry to touch. No rashes  No xanthoma  Neuro: AAO x3 CN II-XII grossly intact      Cardiographics    EC/19/17  On admit        17        Telemetry: no currently on tele.     Echocardiogram:     17      Interpretation Summary      · Left ventricular systolic function is normal. Calculated EF = 57.6%. Estimated EF was in agreement with the calculated EF. Estimated EF = 58%. Normal left ventricular cavity size noted. All left ventricular wall segments contract normally. Left ventricular wall thickness is consistent with mild concentric hypertrophy. Left ventricular diastolic dysfunction is noted (grade I a w/high LAP) consistent with impaired relaxation.  · Left atrial volume is moderately increased. Saline test results are negative.  · The aortic valve is not well visualized. The aortic valve is abnormal in structure. There is mild thickening of the aortic valve.     17 Zio patch  Interpretation Summary      · An abnormal monitor study.  · NSR WITH FREQUENT NON SUSTAINED SVT AND NON SUSTAINED VT          8/27/17  Interpretation Summary         · An abnormal monitor study.      Sinus rhythm with occasional(1087) ventricular ectopic beats, accounting for 1.3% of overall beats. One 4 beat run of NSVT. Three short atrial runs. Longest being 3 beats. No atrial fibrillation       Imaging  Chest X-ray: not ordered    9/19/17  Study Result      CT SCAN OF THE LEFT HIP AND PELVIS      HISTORY: Recent internal fixation of left intertrochanteric fracture.  Evaluate for hematoma. Pain.      TECHNIQUE:  Radiation dose reduction techniques were utilized, including  automated exposure control and exposure modulation based on body size.   The CT scan was performed through the pelvis and both hips and proximal  femurs with thin axial images followed by coronal and sagittal  reconstructions. The following findings are present:      FINDINGS:   1. There has been internal fixation of the intertrochanteric fracture of  the left proximal femur with an intramedullary vladimir and intersecting  screw and the alignment appears satisfactory. There are several small  fracture fragments near the lesser trochanter and at the site of vladimir  insertion at the greater trochanter. There is a small amount of adjacent  hematoma and effacement of the subcutaneous soft tissues near the  operative site. There is no large discrete hematoma present. There is no  deep hematoma within the pelvis.  2. There is moderate distention of the urinary bladder extending to the  level of the sacral promontory. The urinary bladder has a smooth  contour. There is a large amount of stool within the rectal ampulla  suggesting an element of fecal impaction.  3. The remainder of the bony pelvis is unremarkable. No other fractures  are seen.      This report was finalized on 9/19/2017 7:57 PM by Dr. Дмитрий Llanes MD.     8/27/17 MRI Angio Neck    IMPRESSION:    No hemodynamically significant stenoses are seen.  No significant NASCET  criteria stenosis in the bilateral proximal  internal carotid arteries is  Appreciated.      MRI angio of head 8/27/17     IMPRESSION:  No hemodynamically significant stenosis or aneurysm identified.  Multiple bilateral punctate acute infarcts.    MRI brain 8/27/17  CONCLUSION:  1. Moderate diffuse atrophy and considerable chronic small vessel  ischemic change in the white matter. Old lacunar infarct in the left  basal ganglia.  2. There are several very small acute infarcts in both cerebral  hemispheres and therefore concerning for cardiac etiology and further  clinical correlation is recommended. There is no evidence of hemorrhage  or mass effect.  3. Sinusitis scattered in the ethmoid air cells and complete  opacification of the left maxillary sinus.            9/19/17 abd kub  Study Result      ONE VIEW PORTABLE ABDOMEN      HISTORY: Abdominal pain. Constipation.      There is a moderate amount of stool mixed with dense residual barium in  the rectal ampulla combined with a moderate amount of air in the  remainder the colon and this may relate to an element of mild to  moderate fecal impaction.      This report was finalized on 9/19/2017 7:01 PM by Dr. Дмитрий Llanes MD.             Lab Review             Results from last 7 days  Lab Units 09/20/17  0403   SODIUM mmol/L 139   POTASSIUM mmol/L 3.2*   BUN mg/dL 21   CREATININE mg/dL 1.43*   CALCIUM mg/dL 9.2         Results from last 7 days  Lab Units 09/20/17  0403 09/19/17  1504   WBC 10*3/mm3 10.39 10.94*   HEMOGLOBIN g/dL 10.0* 10.0*   HEMATOCRIT % 32.0* 31.7*   PLATELETS 10*3/mm3 186 218       Results from last 7 days  Lab Units 09/20/17  0403 09/19/17  1504   INR  2.46* 2.41*     a1c 5.63 on 9/20/17      Assessment:    Left hip wound infection with hx of ORIF: per ID and IM.  Planned surgery.  Coumadin on hold for surgery.     Hx of Non sustained SVT and  Hx of Nons sustained VT: by zios patch: By zios patch.  K+ is low at 3.2.  Replacement has been ordered by attending IM.  Will request recheck 4  hours post replacement.  She will need to have K and Mag normal before surgery.  Will check Mag level and add to blood in lab. Replace as needed.      Hx of Recent CVA 8/2017:   Coumadin on hold .  Being reversed for surgery.     Renal insuff: per attending. She is currently on IVF at 100 cc.      EF 58% 8/2017.  12 lead EKG showing V4-V5 showing subtle ST changes when compared to previous EKG 8/2017.  Will reorder for today to reassess and post op.   Will start metoprolol tartrate 25 mg BID with first dose this am. She has plenty of BP to tolerate this dose.      Jaci Cervantes seen and examined with no clinical signs of angina or chf, pt is cleared for surgery with non modifiable risk factors.  Jaci Cervantes has been advised to take cardiac meds with sip of water on the day of surgery.    Please use beta blocker for tachycardia perioperatively      Labs/tests ordered for am: bmp, mag, and EKG.         Erlin Martinez MD  9/20/2017, 9:22 AM      EMR Dragon/Transcription:   Dictated utilizing Dragon dictation

## 2017-09-21 PROBLEM — E87.6 HYPOKALEMIA: Status: RESOLVED | Noted: 2017-09-20 | Resolved: 2017-09-21

## 2017-09-21 LAB
ABO + RH BLD: NORMAL
ANION GAP SERPL CALCULATED.3IONS-SCNC: 8.3 MMOL/L
BH BB BLOOD EXPIRATION DATE: NORMAL
BH BB BLOOD TYPE BARCODE: 2800
BH BB BLOOD TYPE BARCODE: 8400
BH BB BLOOD TYPE BARCODE: 8400
BH BB DISPENSE STATUS: NORMAL
BH BB PRODUCT CODE: NORMAL
BH BB UNIT NUMBER: NORMAL
BUN BLD-MCNC: 18 MG/DL (ref 8–23)
BUN/CREAT SERPL: 14.3 (ref 7–25)
CALCIUM SPEC-SCNC: 8.7 MG/DL (ref 8.6–10.5)
CHLORIDE SERPL-SCNC: 102 MMOL/L (ref 98–107)
CO2 SERPL-SCNC: 30.7 MMOL/L (ref 22–29)
CREAT BLD-MCNC: 1.26 MG/DL (ref 0.57–1)
DEPRECATED RDW RBC AUTO: 53.3 FL (ref 37–54)
ERYTHROCYTE [DISTWIDTH] IN BLOOD BY AUTOMATED COUNT: 15.7 % (ref 11.7–13)
GFR SERPL CREATININE-BSD FRML MDRD: 41 ML/MIN/1.73
GLUCOSE BLD-MCNC: 94 MG/DL (ref 65–99)
GLUCOSE BLDC GLUCOMTR-MCNC: 103 MG/DL (ref 70–130)
GLUCOSE BLDC GLUCOMTR-MCNC: 140 MG/DL (ref 70–130)
GLUCOSE BLDC GLUCOMTR-MCNC: 157 MG/DL (ref 70–130)
GLUCOSE BLDC GLUCOMTR-MCNC: 172 MG/DL (ref 70–130)
HCT VFR BLD AUTO: 26.3 % (ref 35.6–45.5)
HGB BLD-MCNC: 8.1 G/DL (ref 11.9–15.5)
INR PPP: 2.1 (ref 0.9–1.1)
MAGNESIUM SERPL-MCNC: 2 MG/DL (ref 1.6–2.4)
MCH RBC QN AUTO: 29 PG (ref 26.9–32)
MCHC RBC AUTO-ENTMCNC: 30.8 G/DL (ref 32.4–36.3)
MCV RBC AUTO: 94.3 FL (ref 80.5–98.2)
PLATELET # BLD AUTO: 154 10*3/MM3 (ref 140–500)
PMV BLD AUTO: 9.7 FL (ref 6–12)
POTASSIUM BLD-SCNC: 3.7 MMOL/L (ref 3.5–5.2)
PROTHROMBIN TIME: 22.8 SECONDS (ref 11.7–14.2)
RBC # BLD AUTO: 2.79 10*6/MM3 (ref 3.9–5.2)
SODIUM BLD-SCNC: 141 MMOL/L (ref 136–145)
UNIT  ABO: NORMAL
UNIT  RH: NORMAL
WBC NRBC COR # BLD: 9.52 10*3/MM3 (ref 4.5–10.7)

## 2017-09-21 PROCEDURE — 80048 BASIC METABOLIC PNL TOTAL CA: CPT | Performed by: HOSPITALIST

## 2017-09-21 PROCEDURE — 63710000001 INSULIN ASPART PER 5 UNITS: Performed by: INTERNAL MEDICINE

## 2017-09-21 PROCEDURE — 25010000002 PIPERACILLIN SOD-TAZOBACTAM PER 1 G: Performed by: ORTHOPAEDIC SURGERY

## 2017-09-21 PROCEDURE — 97110 THERAPEUTIC EXERCISES: CPT | Performed by: PHYSICAL THERAPIST

## 2017-09-21 PROCEDURE — 25810000003 SODIUM CHLORIDE 0.9 % WITH KCL 20 MEQ 20-0.9 MEQ/L-% SOLUTION: Performed by: HOSPITALIST

## 2017-09-21 PROCEDURE — 97162 PT EVAL MOD COMPLEX 30 MIN: CPT | Performed by: PHYSICAL THERAPIST

## 2017-09-21 PROCEDURE — 85027 COMPLETE CBC AUTOMATED: CPT | Performed by: HOSPITALIST

## 2017-09-21 PROCEDURE — 93005 ELECTROCARDIOGRAM TRACING: CPT | Performed by: NURSE PRACTITIONER

## 2017-09-21 PROCEDURE — 82962 GLUCOSE BLOOD TEST: CPT

## 2017-09-21 PROCEDURE — 85610 PROTHROMBIN TIME: CPT | Performed by: HOSPITALIST

## 2017-09-21 PROCEDURE — 93010 ELECTROCARDIOGRAM REPORT: CPT | Performed by: INTERNAL MEDICINE

## 2017-09-21 PROCEDURE — 25010000002 VANCOMYCIN 10 G RECONSTITUTED SOLUTION: Performed by: ORTHOPAEDIC SURGERY

## 2017-09-21 PROCEDURE — 83735 ASSAY OF MAGNESIUM: CPT | Performed by: HOSPITALIST

## 2017-09-21 RX ORDER — MORPHINE SULFATE 2 MG/ML
2 INJECTION, SOLUTION INTRAMUSCULAR; INTRAVENOUS EVERY 4 HOURS PRN
Status: DISCONTINUED | OUTPATIENT
Start: 2017-09-21 | End: 2017-09-25 | Stop reason: HOSPADM

## 2017-09-21 RX ORDER — HYDROCODONE BITARTRATE AND ACETAMINOPHEN 5; 325 MG/1; MG/1
1 TABLET ORAL EVERY 6 HOURS PRN
Status: DISCONTINUED | OUTPATIENT
Start: 2017-09-21 | End: 2017-09-25 | Stop reason: HOSPADM

## 2017-09-21 RX ORDER — WARFARIN SODIUM 5 MG/1
5 TABLET ORAL
Status: DISCONTINUED | OUTPATIENT
Start: 2017-09-21 | End: 2017-09-25 | Stop reason: HOSPADM

## 2017-09-21 RX ADMIN — TAZOBACTAM SODIUM AND PIPERACILLIN SODIUM 3.38 G: 375; 3 INJECTION, SOLUTION INTRAVENOUS at 11:55

## 2017-09-21 RX ADMIN — SENNOSIDES AND DOCUSATE SODIUM 2 TABLET: 8.6; 5 TABLET ORAL at 21:45

## 2017-09-21 RX ADMIN — WARFARIN SODIUM 5 MG: 5 TABLET ORAL at 17:07

## 2017-09-21 RX ADMIN — METOPROLOL TARTRATE 25 MG: 25 TABLET ORAL at 00:43

## 2017-09-21 RX ADMIN — POTASSIUM CHLORIDE AND SODIUM CHLORIDE 75 ML/HR: 900; 150 INJECTION, SOLUTION INTRAVENOUS at 01:12

## 2017-09-21 RX ADMIN — TAZOBACTAM SODIUM AND PIPERACILLIN SODIUM 3.38 G: 375; 3 INJECTION, SOLUTION INTRAVENOUS at 01:12

## 2017-09-21 RX ADMIN — METOPROLOL TARTRATE 25 MG: 25 TABLET ORAL at 10:18

## 2017-09-21 RX ADMIN — INSULIN ASPART 2 UNITS: 100 INJECTION, SOLUTION INTRAVENOUS; SUBCUTANEOUS at 21:45

## 2017-09-21 RX ADMIN — INSULIN ASPART 2 UNITS: 100 INJECTION, SOLUTION INTRAVENOUS; SUBCUTANEOUS at 12:54

## 2017-09-21 RX ADMIN — HYDROCODONE BITARTRATE AND ACETAMINOPHEN 1 TABLET: 5; 325 TABLET ORAL at 22:46

## 2017-09-21 RX ADMIN — ATORVASTATIN CALCIUM 80 MG: 80 TABLET, FILM COATED ORAL at 21:45

## 2017-09-21 RX ADMIN — HYDROCODONE BITARTRATE AND ACETAMINOPHEN 1 TABLET: 5; 325 TABLET ORAL at 06:57

## 2017-09-21 RX ADMIN — VANCOMYCIN HYDROCHLORIDE 1500 MG: 10 INJECTION, POWDER, LYOPHILIZED, FOR SOLUTION INTRAVENOUS at 15:06

## 2017-09-21 RX ADMIN — VANCOMYCIN HYDROCHLORIDE 1250 MG: 10 INJECTION, POWDER, LYOPHILIZED, FOR SOLUTION INTRAVENOUS at 01:12

## 2017-09-21 RX ADMIN — METOPROLOL TARTRATE 25 MG: 25 TABLET ORAL at 21:45

## 2017-09-21 RX ADMIN — HYDROCODONE BITARTRATE AND ACETAMINOPHEN 1 TABLET: 5; 325 TABLET ORAL at 13:20

## 2017-09-21 RX ADMIN — POTASSIUM CHLORIDE AND SODIUM CHLORIDE 75 ML/HR: 900; 150 INJECTION, SOLUTION INTRAVENOUS at 15:06

## 2017-09-21 RX ADMIN — TAZOBACTAM SODIUM AND PIPERACILLIN SODIUM 3.38 G: 375; 3 INJECTION, SOLUTION INTRAVENOUS at 17:07

## 2017-09-21 RX ADMIN — SERTRALINE 100 MG: 100 TABLET, FILM COATED ORAL at 11:18

## 2017-09-21 NOTE — PLAN OF CARE
Problem: Patient Care Overview (Adult)  Goal: Plan of Care Review  Outcome: Ongoing (interventions implemented as appropriate)    09/20/17 1801 09/21/17 0504   Coping/Psychosocial Response Interventions   Plan Of Care Reviewed With --  patient   Patient Care Overview   Progress no change --    Outcome Evaluation   Outcome Summary/Follow up Plan --  Pt admitted to the floor post-op I & D of L hip. She has two dressings on the left hip with drainage to hemovac. Pt arouses easily and is alert and oriented x4. Pt denies any pain. NS W/20K @75ml/hr. VSS. Continue to monitor. Safety maintained.          Problem: Fall Risk (Adult)  Goal: Absence of Falls  Outcome: Ongoing (interventions implemented as appropriate)    Problem: Pain, Acute (Adult)  Goal: Acceptable Pain Control/Comfort Level  Outcome: Ongoing (interventions implemented as appropriate)

## 2017-09-21 NOTE — PERIOPERATIVE NURSING NOTE
SPOKE WITH DR FERREIRA BRIEFLY RE: PATIENT BED STATUS POST OPERATIVELY. HE STATED TO PLEASE HAVE PATIENT TRANSFERRED TO TELEMETRY BED AFTER SURGERY

## 2017-09-21 NOTE — OP NOTE
Procedure Note    Jaci Cervantes  9/19/2017 - 9/20/2017    Pre-op Diagnosis:   Infected left hip hematoma, s/p recent intramedullary nailing of left hip    Post-op Diagnosis:     Same    Procedure:  1.  Irrigation and debridement, left hip    Surgeon(s):  Marcello Reardon Jr., MD    Anesthesia: General    Estimated Blood Loss: 100cc    Specimens:                  ID Type Source Tests Collected by Time Destination   1 :  Wound Hip, Left ANAEROBIC CULTURE, WOUND CULTURE Marcello Reardon Jr., MD 9/20/2017 1847    2 : left hip deep tissue #1 Tissue Hip, Left ANAEROBIC CULTURE, TISSUE/BONE CULTURE Marcello Reardon Jr., MD 9/20/2017 1848    3 : left hip deep tissue #2 Tissue Hip, Left ANAEROBIC CULTURE, TISSUE/BONE CULTURE Marcello Reardon Jr., MD 9/20/2017 1850          Drains:   1.  Deep 10 [place] hemovac drain x1  2.  Deep 14 [place] hemovac drain x1    Complications: None apparent    Disposition:  Stable to PACU for recovery    Indications for procedure:  The patient is a 77-year-old female with multiple medical comorbidities who is approximately 4 weeks status post intramedullary nailing of a comminuted intertrochanteric left hip fracture whose postoperative course was complicated by stroke required full-dose anticoagulation.  She developed persistent wound drainage coinciding with the initiation of anticoagulation, and examination was concerning for possible infected hematoma or abscess.  CT confirmed a small fluid collection/abscess near the surgical site.  Operative irrigation and debridement was recommended.  The risks/benefits of surgery, including pain, infection, bleeding, wound healing problems, need for future procedures, recurrent hematoma, DVT/PE, stroke, cardiac event, and/or death were discussed, and the patient elected to proceed with surgery.    Procedure in detail:  The correct patient was identified in preoperative holding.  All risks and benefits of surgery were again discussed in detail, and the patient agreed to  proceed with surgery.  The operative extremity was confirmed and marked by myself.  Operative consent reviewed and confirmed to be signed by the patient and myself.    At this time, the patient was wheeled to the operative theatre and placed supine on the OR table a bump under the ipsilateral hip.  CAIT/SCD placed on nonoperative leg. Anesthesia was induced smoothly by our anesthesia colleagues.   The left lower extremity was prepped and draped in standard sterile fashion.  Appropriate presurgical timeout was performed, confirming correct patient, correct extremity, correct procedure, availability of sterile instruments; antibiotics were held until deep cultures could be obtained.    At this time, the proximal hip incision was examined.  There was no gross dehiscence, but there was some maceration and a small amount of yellowish brownish drainage.  The skin edges were sharply debrided back to healthy bleeding margins and the wound extended for 2 cm in either direction.  The wound was then bluntly reopened with a tonsil.  There was a gush of brownish yellow-appearing fluid with some necrotic appearing fatty tissue.  This was just deep to the subcutaneous tissues.  It had the appearance of either fat necrosis or retained hematoma that had become infected.  There was no gross purulence.  A fluid sample was sent for culture, as well as two deep tissue specimens for culture.  At this time, per infectious disease recommendation, empiric antibiotics were administered in the form of 1 g of vancomycin and 3.375 g of Zosyn.    Dissection was then carried down bluntly to the deeper fascial layers.  These did appear to be intact.  These were opened in line, dissection carried down bluntly to the greater trochanter/proximal femur.  There was again evidence of some fatty necrosis, but no large fluid collection or purulence.    At this time, the more distal hip incision was examined.  This appeared to be sealed, but with very firm  pressure I could milk a small amount of fluid from a small macerated aspect.  I thus re-opened this incision in line sharply with a 15 blade.  Skin edges were debrided back to healthy bleeding margins.  As I dissected bluntly with a tonsil through the subcutaneous tissues, there was another egress of a smaller brownish yellow fluid collection.  Dissection was carried down bluntly to the fascia, which did appear to be intact.  This was opened in line, and dissection carried down to the lateral cortex of the proximal femur.  There was no large deep fluid collection or obvious purulence.    At this time, I performed a thorough debridement of both wounds, using a combination of rongeur and large curette to remove any unhealthy or infected appearing tissue.  Once all remaining tissue in the wounds appeared to be healthy, viable, and bleeding, I used 6 L of sterile saline via cystoscopy tubing to thoroughly irrigate both wounds.  Hemostasis was then achieved.      A 14 [place] Hemovac deep drain was placed in the proximal incision, and a 10 [place] deep Hemovac drain placed in the more distal incision.      The wounds were closed meticulously in layers, using 0 PDS in the deep fascial layers.  She had a large amount of vascularized adipose tissue in the walls of the proximal incision, and 2-0 PDS was used to close down the expansive dead space.  2-0 PDS was then used in buried interrupted fashion to close the subcutaneous layers.  The skin was then closed with 2-0 nylon in simple interrupted fashion.  Xeroform, sterile gauze, and a well-padded soft bulky dressing was applied.  The drapes taken down.  The patient was awoken from anesthesia without apparent complication and taken to PACU for recovery.    Postoperative Plan:  Weightbearing status: Weightbearing as tolerated  DVT Prophylaxis: We will resume her Coumadin tomorrow: TEDs and SCDs.        Marcello Reardon Jr, MD     Date: 9/20/2017  Time: 8:36 PM

## 2017-09-21 NOTE — PLAN OF CARE
Problem: Perioperative Period (Adult)  Goal: Signs and Symptoms of Listed Potential Problems Will be Absent or Manageable (Perioperative Period)  PATIENT CONFUSED IN THE IMMEDIATE POST OP PHASE IN PACU. SHE WILL MAEX4 BUT OVERTLY WEAK.REMAINS SR WITH OCC. PVC. HIP AND THIGH DRAINS WITH 45cc AND 3cc RESPECTIVELY. DRESSING REMAIN CD+I AT TRANSFER TO FLOOR. SHE WILL DORSI AND PLANTAR FLEX BUT ONLY AFTER MULTIPLE REPEATED REQUESTS BY THIS RN, DISTAL EXTREMITY PINK W+D    DAUGHTER IN LAW VOICING CONCERNS RE: PATIENT WITH FAIRLY LARGE FECAL IMPACTION PRE-OPERATIVELY; DISCUSSED WITH HER ORDER FOR DULCOLAX SUPPOSITORIES, WILL DISCUSS WITH RECEIVING NURSE WHEN TRANSFERRED TO FLOOR

## 2017-09-21 NOTE — PROGRESS NOTES
"Pharmacy to Dose Vancomycin    Patient: Jaci Cervantes (434/1,  LOS: 2 days )  Relevant clinical data and objective history reviewed:  77 y.o. female 68\" (172.7 cm) 240 lb (109 kg)  Body mass index is 36.49 kg/(m^2).  she has a past medical history of COPD (chronic obstructive pulmonary disease); Diabetes mellitus; History of transfusion; Hyperlipidemia; Hypertension; and Stroke (2017).    Day #1  Consult for Dr Reardon  Indication: bone and joint (L hip)    Cultures:   tissue cx collected, blood cx x 2 collected    Other Abx: zosyn (auto adjusted dose)      Results from last 7 days  Lab Units 17  0403 17  1504   WBC 10*3/mm3 10.39 10.94*   HEMOGLOBIN g/dL 10.0* 10.0*   HEMATOCRIT % 32.0* 31.7*   PLATELETS 10*3/mm3 186 218     Temp (24hrs), Av.6 °F (36.4 °C), Min:97 °F (36.1 °C), Max:98.2 °F (36.8 °C)    CREATININE: 1.43 mg/dL ABNORMAL (17 0403)  Estimated creatinine clearance: 41.2 mL/min    Assessment/Plan:   Received 1 gm in OR at ~1900    1) Vancomycin 1250 mg IV x1. Scheduled dose to be determined. Cultures pending. CBC/BMP in AM.    Ton Santana, PharmD  17 12:42 AM    "

## 2017-09-21 NOTE — THERAPY EVALUATION
Acute Care - Physical Therapy Initial Evaluation  [facility]     Patient Name: Jaci Cervantes  : 1940  MRN: [id number]  Today's Date: 2017                Admit Date: 2017     Visit Dx:    ICD-10-CM ICD-9-CM   1. Surgical wound infection, initial encounter T81.4XXA 998.59   2. Fecal impaction K56.41 560.32   3. Difficulty walking R26.2 719.7     Patient Active Problem List   Diagnosis   • COPD (chronic obstructive pulmonary disease)   • Influenza A   • Essential hypertension   • Type 2 diabetes mellitus with complication   • GERD (gastroesophageal reflux disease)   • Anxiety   • Closed fracture of neck of left femur   • MAURICE (acute kidney injury)   • Acute post-hemorrhagic anemia   • Acute embolic stroke   • Right lower lobe pneumonia   • Surgical wound infection   • History of stroke   • SVT (supraventricular tachycardia)   • Chronic kidney disease, stage 3     Past Medical History:   Diagnosis Date   • COPD (chronic obstructive pulmonary disease)    • Diabetes mellitus    • History of transfusion    • Hyperlipidemia    • Hypertension    • Stroke 2017     Past Surgical History:   Procedure Laterality Date   • HYSTERECTOMY     • INCISION AND DRAINAGE HIP Left 2017    Procedure: Irrigation and debridement, left hip;  Surgeon: Marcello Reardon Jr., MD;  Location: [facility];  Service:    • HI OPEN FIX INTER/SUBTROCH FX,IMPLNT Left 2017    Procedure: FEMUR INTRAMEDULLARY NAILING/RODDING;  Surgeon: Marcello Reardon Jr., MD;  Location: [facility];  Service: Orthopedics   • TUBAL ABDOMINAL LIGATION            PT ASSESSMENT (last 72 hours)      PT Evaluation       17 1316 17 1422    Rehab Evaluation    Document Type evaluation  -KH     Patient Effort, Rehab Treatment fair  -KH     Symptoms Noted During/After Treatment increased pain  -KH     General Information    General Observations in bed, 2 drain L hip  -KH     Pertinent History Of Current Problem s/p I&D L hip hematoma   -KH     Precautions/Limitations fall precautions   WBAT per chart. Pts 50% WB  -KH     Prior Level of Function mod assist:  -KH     Equipment Currently Used at Home  hospital bed;wheelchair;walker, rolling  -    Plans/Goals Discussed With patient  -     Living Environment    Lives With  facility resident   Is from rehab at [facility] right now. Lives on campus in assisted living normally.  -VN    Living Arrangements  assisted living;extended care facility   Is doing rehab at [facility] but lives at assiste living on same campus.   -VN    Home Accessibility  no concerns  -VN    Stair Railings at Home  none  -VN    Type of Financial/Environmental Concern  none  -VN    Transportation Available  ambulance;car;family or friend will provide  -VN    Living Environment Comment Admitted from rehab and plans to return  -     Clinical Impression    Patient/Family Goals Statement return to rehab  -     Criteria for Skilled Therapeutic Interventions Met yes;treatment indicated  -     Impairments Found (describe specific impairments) ROM;gait, locomotion, and balance  -     Rehab Potential good, to achieve stated therapy goals  -     Pain Assessment    Pain Assessment 0-10  -     Pain Score 3  -     Pain Type Acute pain  -     Pain Location Hip  -KH     Pain Intervention(s) Repositioned  -KH     Response to Interventions tolerated  -     Cognitive Assessment/Intervention    Current Cognitive/Communication Assessment functional  -     Orientation Status oriented to;person;place  -     Follows Commands/Answers Questions 75% of the time  -     Personal Safety WNL/WFL  -     Personal Safety Interventions fall prevention program maintained;gait belt;nonskid shoes/slippers when out of bed  -     ROM (Range of Motion)    General ROM Detail WFl except L hip  -KH     MMT (Manual Muscle Testing)    General MMT Assessment Detail functionally 3/5 except LLE, limited by pain  -KH     Bed Mobility,  Assessment/Treatment    Bed Mobility, Assistive Device bed rails;draw sheet  -     Bed Mob, Supine to Sit, Brewer moderate assist (50% patient effort);2 person assist required  -KH     Bed Mob, Sit to Supine, Brewer maximum assist (25% patient effort);2 person assist required  -KH     Transfer Assessment/Treatment    Transfers, Sit-Stand Brewer maximum assist (25% patient effort);moderate assist (50% patient effort);2 person assist required  -KH     Transfers, Stand-Sit Brewer moderate assist (50% patient effort);2 person assist required  -KH     Transfers, Sit-Stand-Sit, Assist Device rolling walker  -     Transfer, Impairments strength decreased;pain  -     Gait Assessment/Treatment    Gait, Brewer Level not appropriate to assess  -     Therapy Exercises    Exercise Protocols hip ORIF  -     Hip ORIF Exercises completed protocol;5 reps;with assist  -     Positioning and Restraints    Pre-Treatment Position in bed  -     Post Treatment Position bed  -KH     In Bed supine;call light within reach;encouraged to call for assist;exit alarm on;notified ns  -       09/20/17 0407 09/20/17 0000    Living Environment    Transportation Available ambulance  -BM     Muscle Tone Assessment    Muscle Tone Assessment Bilateral Upper Extremities  -BM Bilateral Upper Extremities  -BM    Bilateral Upper Extremities Muscle Tone Assessment other (see comments)   no arm drift  -BM other (see comments)   no arm drift  -      09/19/17 2357 09/19/17 2356    General Information    Equipment Currently Used at Home  walker, rolling;walker, standard;wheelchair, motorized  -BM    Living Environment    Lives With facility resident  -BM     Living Arrangements assisted living  -BM     Home Accessibility no concerns  -BM     Stair Railings at Home none  -BM     Type of Financial/Environmental Concern none  -BM     Transportation Available ambulance  -       09/19/17 2111 09/19/17 1800    General  Information    Equipment Currently Used at Home  walker, rolling;walker, standard;wheelchair, motorized  -WM    Living Environment    Transportation Available ambulance  -WM     Muscle Tone Assessment    Muscle Tone Assessment Bilateral Upper Extremities  -BM     Bilateral Upper Extremities Muscle Tone Assessment other (see comments)   no arm drift  -BM       User Key  (r) = Recorded By, (t) = Taken By, (c) = Cosigned By    Initials Name Provider Type     Sarah Gurrola, PT Physical Therapist    STANFORD Cha, RN Registered Nurse    WM Gatito Vallejo, RN Registered Nurse    HAILEY Barry RN Case Manager          Physical Therapy Education     Title: PT OT SLP Therapies (Active)     Topic: Physical Therapy (Done)     Point: Mobility training (Done)    Learning Progress Summary    Learner Readiness Method Response Comment Documented by Status   Patient Acceptance AMILCAR ALEMAN,[facility] 09/21/17 1345 Done               Point: Home exercise program (Done)    Learning Progress Summary    Learner Readiness Method Response Comment Documented by Status   Patient Acceptance AMILCAR ALEMAN,[facility] 09/21/17 1345 Done               Point: Body mechanics (Done)    Learning Progress Summary    Learner Readiness Method Response Comment Documented by Status   Patient Acceptance AMILCAR ALEMAN,[facility] 09/21/17 1345 Done               Point: Precautions (Done)    Learning Progress Summary    Learner Readiness Method Response Comment Documented by Status   Patient Acceptance AMILCAR ALEMAN,[facility] 09/21/17 1345 Done                      User Key     Initials Effective Dates Name Provider Type Counts include 234 beds at the [facility] 05/18/15 -  Sarah Gurrola, PT Physical Therapist PT                PT Recommendation and Plan  Anticipated Discharge Disposition: skilled nursing facility  Planned Therapy Interventions: balance training, bed mobility training, gait training, home exercise program, patient/family education, ROM (Range of Motion), strengthening,  transfer training  PT Frequency: daily  Plan of Care Review  Outcome Summary/Follow up Plan: PT is s/p I&D of L hip with evacuation of hematoma. Pt presents with generalized weakness, limited ROM and inability to walk. PT would benefit from PT to address these impairments. Pt plans to return to rehab at d/c.          IP PT Goals       09/21/17 1346          Bed Mobility PT LTG    Bed Mobility PT LTG, Date Established 09/21/17  -KH      Bed Mobility PT LTG, Time to Achieve 1 wk  -KH      Bed Mobility PT LTG, Activity Type all bed mobility  -KH      Bed Mobility PT LTG, Towns Level moderate assist (50% patient effort);2 person assist required  -KH      Transfer Training PT LTG    Transfer Training PT LTG, Date Established 09/21/17  -KH      Transfer Training PT LTG, Time to Achieve 1 wk  -KH      Transfer Training PT LTG, Activity Type all transfers;bed to chair /chair to bed  -KH      Transfer Training PT LTG, Towns Level moderate assist (50% patient effort);2 person assist required  -KH      Gait Training PT LTG    Gait Training Goal PT LTG, Date Established 09/21/17  -KH      Gait Training Goal PT LTG, Time to Achieve 1 wk  -KH      Gait Training Goal PT LTG, Towns Level moderate assist (50% patient effort);2 person assist required  -KH      Gait Training Goal PT LTG, Assist Device walker, rolling  -KH      Gait Training Goal PT LTG, Distance to Achieve 10 ft  -KH      Patient Education PT LTG    Patient Education PT LTG, Date Established 09/21/17  -      Patient Education PT LTG, Time to Achieve 1 wk  -KH      Patient Education PT LTG, Education Type HEP;precaution per surgeon  -      Patient Education PT LTG, Education Understanding demonstrate adequately  -KH        User Key  (r) = Recorded By, (t) = Taken By, (c) = Cosigned By    Initials Name Provider Type    THO Gurrola, PT Physical Therapist                Outcome Measures       09/21/17 1348          How much help from  another person do you currently need...    Turning from your back to your side while in flat bed without using bedrails? 2  -KH      Moving from lying on back to sitting on the side of a flat bed without bedrails? 2  -KH      Moving to and from a bed to a chair (including a wheelchair)? 1  -KH      Standing up from a chair using your arms (e.g., wheelchair, bedside chair)? 2  -KH      Climbing 3-5 steps with a railing? 1  -KH      To walk in hospital room? 1  -KH      AM-PAC 6 Clicks Score 9  -KH      Functional Assessment    Outcome Measure Options AM-PAC 6 Clicks Basic Mobility (PT)  -        User Key  (r) = Recorded By, (t) = Taken By, (c) = Cosigned By    Initials Name Provider Type    THO Gurrola PT Physical Therapist           Time Calculation:         PT Charges       09/21/17 1351          Time Calculation    Start Time 1320  -KH      Stop Time 1345  -KH      Time Calculation (min) 25 min  -THO      PT Received On 09/21/17  -THO      PT - Next Appointment 09/22/17  -THO      PT Goal Re-Cert Due Date 09/28/17  -THO        User Key  (r) = Recorded By, (t) = Taken By, (c) = Cosigned By    Initials Name Provider Type    THO Gurrola PT Physical Therapist          Therapy Charges for Today     Code Description Service Date Service Provider Modifiers Qty    33662415483 HC PT EVAL MOD COMPLEXITY 2 9/21/2017 Sarah Gurrola, PT GP 1    75304940843 HC PT THER PROC EA 15 MIN 9/21/2017 Sarah Gurrola, PT GP 1    22740936101 HC PT THER SUPP EA 15 MIN 9/21/2017 Sarah Gurrola, PT GP 1          PT G-Codes  Outcome Measure Options: AM-PAC 6 Clicks Basic Mobility (PT)      Sarah Gurrola, GILBERT  9/21/2017

## 2017-09-21 NOTE — PLAN OF CARE
Problem: Patient Care Overview (Adult)  Goal: Plan of Care Review  Outcome: Ongoing (interventions implemented as appropriate)    09/21/17 2303   Coping/Psychosocial Response Interventions   Plan Of Care Reviewed With patient;son   Patient Care Overview   Progress improving   Outcome Evaluation   Outcome Summary/Follow up Plan Cont Zosyn/Vanco; pt worked with PT; pain controlled; encouraged OOB activity         Problem: Fall Risk (Adult)  Goal: Absence of Falls  Outcome: Ongoing (interventions implemented as appropriate)    Problem: Pain, Acute (Adult)  Goal: Acceptable Pain Control/Comfort Level  Outcome: Ongoing (interventions implemented as appropriate)    Problem: Pressure Ulcer Risk (Fitz Scale) (Adult,Obstetrics,Pediatric)  Goal: Skin Integrity  Outcome: Ongoing (interventions implemented as appropriate)

## 2017-09-21 NOTE — PROGRESS NOTES
"Orthopaedic Surgery  Daily Progress Note    /78 (BP Location: Left arm, Patient Position: Lying)  Pulse 85  Temp 97.6 °F (36.4 °C) (Oral)   Resp 17  Ht 68\" (172.7 cm)  Wt 257 lb 3.2 oz (117 kg)  SpO2 97%  BMI 39.11 kg/m2    Lab Results (last 24 hours)     Procedure Component Value Units Date/Time    Magnesium [[id number]]  (Normal) Collected:  09/20/17 0403    Specimen:  Blood Updated:  09/20/17 1033     Magnesium 1.9 mg/dL     POC Glucose Fingerstick [[id number]]  (Normal) Collected:  09/20/17 1202    Specimen:  Blood Updated:  09/20/17 1207     Glucose 129 mg/dL     Narrative:       Meter: [id number] : [id number] Ag Torri    Protime-INR [[id number]]  (Abnormal) Collected:  09/20/17 1421    Specimen:  Blood Updated:  09/20/17 1442     Protime 20.1 (H) Seconds      INR 1.78 (H)    Potassium [[id number]]  (Normal) Collected:  09/20/17 1421    Specimen:  Blood Updated:  09/20/17 1454     Potassium 3.9 mmol/L     POC Glucose Fingerstick [[id number]]  (Normal) Collected:  09/20/17 1601    Specimen:  Blood Updated:  09/20/17 1605     Glucose 116 mg/dL     Narrative:       Meter: [id number] : [id number] Ag Otrri    Protime-INR [[id number]]  (Abnormal) Collected:  09/20/17 1756    Specimen:  Blood Updated:  09/20/17 1844     Protime 20.3 (H) Seconds      INR 1.81 (H)    Blood Culture [[id number]]  (Normal) Collected:  09/19/17 1758    Specimen:  Blood from Arm, Right Updated:  09/20/17 1901     Blood Culture No growth at 24 hours    Anaerobic Culture [[id number]] Collected:  09/20/17 1848    Specimen:  Tissue from Hip, Left Updated:  09/20/17 1916    Anaerobic Culture [[id number]] Collected:  09/20/17 1850    Specimen:  Tissue from Hip, Left Updated:  09/20/17 1916    Anaerobic Culture [[id number]] Collected:  09/20/17 1847    Specimen:  Wound from Hip, Left Updated:  09/20/17 1916    POC Glucose Fingerstick [[id number]]  (Normal) Collected:  09/20/17 2145    Specimen:  Blood Updated:  09/20/17 2147     " Glucose 112 mg/dL     Narrative:       Meter: [id number] : [id number] Tamir LUBIN    Blood Culture [[id number]]  (Normal) Collected:  09/19/17 1824    Specimen:  Blood from Arm, Left Updated:  09/20/17 2201     Blood Culture No growth at 24 hours    CBC (No Diff) [[id number]]  (Abnormal) Collected:  09/21/17 0357    Specimen:  Blood Updated:  09/21/17 0420     WBC 9.52 10*3/mm3      RBC 2.79 (L) 10*6/mm3      Hemoglobin 8.1 (L) g/dL      Hematocrit 26.3 (L) %      MCV 94.3 fL      MCH 29.0 pg      MCHC 30.8 (L) g/dL      RDW 15.7 (H) %      RDW-SD 53.3 fl      MPV 9.7 fL      Platelets 154 10*3/mm3     Protime-INR [[id number]]  (Abnormal) Collected:  09/21/17 0357    Specimen:  Blood Updated:  09/21/17 0427     Protime 22.8 (H) Seconds      INR 2.10 (H)    Basic Metabolic Panel [[id number]]  (Abnormal) Collected:  09/21/17 0357    Specimen:  Blood Updated:  09/21/17 0440     Glucose 94 mg/dL      BUN 18 mg/dL      Creatinine 1.26 (H) mg/dL      Sodium 141 mmol/L      Potassium 3.7 mmol/L      Chloride 102 mmol/L      CO2 30.7 (H) mmol/L      Calcium 8.7 mg/dL      eGFR Non African Amer 41 (L) mL/min/1.73      BUN/Creatinine Ratio 14.3     Anion Gap 8.3 mmol/L     Narrative:       The MDRD GFR formula is only valid for adults with stable renal function between ages 18 and 70.    Magnesium [[id number]]  (Normal) Collected:  09/21/17 0357    Specimen:  Blood Updated:  09/21/17 0440     Magnesium 2.0 mg/dL     POC Glucose Fingerstick [[id number]]  (Normal) Collected:  09/21/17 0615    Specimen:  Blood Updated:  09/21/17 0617     Glucose 103 mg/dL     Narrative:       Meter: [id number] : [id number] Asuncion Gutierrez CNA    Wound Culture [[id number]]  (Normal) Collected:  09/20/17 1847    Specimen:  Wound from Hip, Left Updated:  09/21/17 0713     Wound Culture Culture in progress     Gram Stain Result Moderate (3+) WBCs seen      No organisms seen    Tissue/Bone Culture [[id number]]  (Normal) Collected:  09/20/17 2136     Specimen:  Tissue from Hip, Left Updated:  09/21/17 0714     Tissue Culture Culture in progress     Gram Stain Result Many (4+) Red blood cells      Many (4+) WBCs seen      No organisms seen    Tissue/Bone Culture [[id number]]  (Normal) Collected:  09/20/17 1848    Specimen:  Tissue from Hip, Left Updated:  09/21/17 0716     Tissue Culture Culture in progress     Gram Stain Result Many (4+) Red blood cells      Many (4+) WBCs seen      No organisms seen          Imaging Results (last 24 hours)     ** No results found for the last 24 hours. **          Patient Care Team:  Stephan Felder MD as PCP - General  Stephan Felder MD as PCP - Family Medicine    SUBJECTIVE  Patient reports she feels well, minimal pain in hip    PHYSICAL EXAM  Resting in NAD.  Sensorium much improved following large fecal disimpaction.  A&O x3.    Dressings c/d/i, no staining/drainage.  HVs x 2 in place with mild sanguinous output, holding suction.    Active ankle DF and PF  Reports sensation intact s/s/dp/sp/t  Foot warm, perfused, palpable DP     Principal Problem:    Surgical wound infection  Active Problems:    COPD (chronic obstructive pulmonary disease)    Essential hypertension    Type 2 diabetes mellitus with complication    History of stroke    SVT (supraventricular tachycardia)    Hypokalemia    Chronic kidney disease, stage 3      PLAN / DISPOSITION:  78 y/o female POD 1 s/p I&D of left hip with evacuation of infected hematoma.    Cultures are NGTD.    1. Pain control: Orals  2.  Antibiotics: Continue empiric coverage with vanc/zosyn; follow up culture data, narrow coverage as directed by cultures/ID; appreciate Dr. Veronica's care/assistance  3.  PT: WBAT, no restrictions  4. DVT: will restart Coumadin today; unfortunately, this will place her at risk for recurrent hematoma, but this is outweighed by risk of recurrent stroke; CAIT/SCDs, mobilization  5.  Leave HV drains in place  6. Dispo: pending    Marcello Reardon Jr,  MD  09/21/17  8:07 AM

## 2017-09-21 NOTE — PROGRESS NOTES
Discharge Planning Assessment  [facility]     Patient Name: Jaci Cervantes  MRN: [id number]  Today's Date: 9/21/2017    Admit Date: 9/19/2017          Discharge Needs Assessment       09/21/17 1411    Living Environment    Lives With facility resident    Living Arrangements extended care facility;assisted living    Home Accessibility no concerns    Transportation Available family or friend will provide;car    Living Environment    Provides Primary Care For no one    Primary Care Provided By spouse/significant other    Quality Of Family Relationships supportive    Discharge Needs Assessment    Concerns To Be Addressed basic needs concerns;discharge planning concerns;home safety concerns    Readmission Within The Last 30 Days no previous admission in last 30 days    Outpatient/Agency/Support Group Needs skilled nursing facility (specify);assisted living facility (specify)    Anticipated Changes Related to Illness inability to care for self    Equipment Currently Used at Home hospital bed;wheelchair;walker, rolling    Discharge Facility/Level Of Care Needs nursing facility, skilled    Discharge Disposition still a patient            Discharge Plan       09/21/17 1422    Case Management/Social Work Plan    Additional Comments --      09/21/17 1412    Case Management/Social Work Plan    Plan [facility]    Additional Comments IMM  9/19  Spoke to patient at bedside. I introduced myself and explained CCP role.  Verified face sheet and confirmed that patient uses [facility] on [address].  Pt lives in an assisted Living at [facility].   She is independent with most ADL's.  She uses a rolling walker to ambulate.    Pt will return to assisted Living at [place]  Long term plan is to move in with daughter.  CCP following        Discharge Placement     Facility/Agency Request Status Selected? Address Phone Number Fax Number    [facility] Accepted    Yes [address] [facility]  [id number] [phone]        Diana Barry RN 9/20/2017 14:48    Called to [facility].                           Demographic Summary     None            Functional Status       09/21/17 1410    Functional Status Current    Ambulation 3-->assistive equipment and person    Transferring 3-->assistive equipment and person    Toileting 2-->assistive person    Bathing 2-->assistive person    Dressing 2-->assistive person    Eating 0-->independent    Communication 0-->understands/communicates without difficulty    Swallowing (if score 2 or more for any item, consult Rehab Services) 0-->swallows foods/liquids without difficulty    Change in Functional Status Since Onset of Current Illness/Injury yes    Functional Status Prior    Ambulation 3-->assistive equipment and person    Transferring 3-->assistive equipment and person    Toileting 2-->assistive person    Bathing 2-->assistive person    Dressing 2-->assistive person    Eating 4-->completely dependent    Communication 0-->understands/communicates without difficulty    Swallowing 0-->swallows foods/liquids without difficulty            Psychosocial     None            Abuse/Neglect     None            Legal     None            Substance Abuse     None            Patient Forms     None          Tonie Buckner, RN

## 2017-09-21 NOTE — PLAN OF CARE
Problem: Pressure Ulcer Risk (Fitz Scale) (Adult,Obstetrics,Pediatric)  Goal: Identify Related Risk Factors and Signs and Symptoms  Outcome: Outcome(s) achieved Date Met:  09/21/17 09/21/17 1144   Pressure Ulcer Risk (Fitz Scale)   Related Risk Factors (Pressure Ulcer Risk (Fitz Scale)) age extremes;infection;mobility impaired

## 2017-09-21 NOTE — PROGRESS NOTES
"  Infectious Diseases Progress Note    Nigel Veronica MD     [facility]  Los: 2 days  Patient Identification:  Name: Jaci Cervantes  Age: 77 y.o.  Sex: female  :  1940  MRN: [id number]         Primary Care Physician: Stephan Felder MD            Subjective: Feeling better after having bowel movements and relief of constipation.  Left hip surgical site does not hurt and she doesn't have any deep pain in her hip.  Interval History: Underwent I&D of hematoma last night with placement of drains, and subsequent introduction of vancomycin and Zosyn.     Objective:    Scheduled Meds:  atorvastatin 80 mg Oral Nightly   insulin aspart 0-9 Units Subcutaneous 4x Daily With Meals & Nightly   metoprolol tartrate 25 mg Oral Q12H   piperacillin-tazobactam 3.375 g Intravenous Q8H   sennosides-docusate sodium 2 tablet Oral Nightly   sertraline 100 mg Oral Daily     Continuous Infusions:  Pharmacy to dose vancomycin     sodium chloride 0.9 % with KCl 20 mEq 75 mL/hr Last Rate: 75 mL/hr (17 0112)       Vital signs in last 24 hours:  Temp:  [97 °F (36.1 °C)-98.2 °F (36.8 °C)] 97.6 °F (36.4 °C)  Heart Rate:  [81-98] 85  Resp:  [14-20] 17  BP: (113-185)/(47-90) 162/78    Intake/Output:    Intake/Output Summary (Last 24 hours) at 17 0803  Last data filed at 17 2241   Gross per 24 hour   Intake             2582 ml   Output               48 ml   Net             2534 ml       Exam:  /78 (BP Location: Left arm, Patient Position: Lying)  Pulse 85  Temp 97.6 °F (36.4 °C) (Oral)   Resp 17  Ht 68\" (172.7 cm)  Wt 257 lb 3.2 oz (117 kg)  SpO2 97%  BMI 39.11 kg/m2    General Appearance:    Alert, cooperative, no distress, AAOx3, And smiling.                          Head:    Normocephalic, without obvious abnormality, atraumatic                           Eyes:    PERRL, conjunctiva/corneas clear, EOM's intact, both eyes                         Throat:   Lips, tongue, gums normal; oral " mucosa pink and moist                           Neck:   Supple, symmetrical, trachea midline, no JVD                         Lungs:    Clear to auscultation bilaterally, respirations unlabored                 Chest Wall:    No tenderness or deformity                          Heart:    Irregularly irregular rate and rhythm, S1 and S2 normal, no murmur,no  Rub                                      or gallop                  Abdomen:     Soft, non-tender, bowel sounds active, no masses, no                                                        organomegaly                  Extremities:   Left hip surgical site is dressed with Hemovac drain in place no surrounding cellulitis noted                        Pulses:   Pulses palpable in all extremities                            Skin:   Skin is warm and dry,  no rashes or palpable lesions                Data Review:    I reviewed the patient's new clinical results.    Results from last 7 days  Lab Units 09/21/17  0357 09/20/17  0403 09/19/17  1504   WBC 10*3/mm3 9.52 10.39 10.94*   HEMOGLOBIN g/dL 8.1* 10.0* 10.0*   PLATELETS 10*3/mm3 154 186 218       Results from last 7 days  Lab Units 09/21/17  0357 09/20/17  1421 09/20/17  0403 09/19/17  1504   SODIUM mmol/L 141  --  139 137   POTASSIUM mmol/L 3.7 3.9 3.2* 3.5   CHLORIDE mmol/L 102  --  95* 93*   CO2 mmol/L 30.7*  --  29.7* 31.6*   BUN mg/dL 18  --  21 21   CREATININE mg/dL 1.26*  --  1.43* 1.62*   CALCIUM mg/dL 8.7  --  9.2 9.5   GLUCOSE mg/dL 94  --  116* 163*       Microbiology Results (last 10 days)     Procedure Component Value - Date/Time    Tissue/Bone Culture [[id number]]  (Normal) Collected:  09/20/17 1850    Lab Status:  Preliminary result Specimen:  Tissue from Hip, Left Updated:  09/21/17 0714     Tissue Culture Culture in progress     Gram Stain Result Many (4+) Red blood cells      Many (4+) WBCs seen      No organisms seen    Tissue/Bone Culture [[id number]]  (Normal) Collected:  09/20/17 1848    Lab Status:   Preliminary result Specimen:  Tissue from Hip, Left Updated:  09/21/17 0716     Tissue Culture Culture in progress     Gram Stain Result Many (4+) Red blood cells      Many (4+) WBCs seen      No organisms seen    Wound Culture [[id number]]  (Normal) Collected:  09/20/17 1847    Lab Status:  Preliminary result Specimen:  Wound from Hip, Left Updated:  09/21/17 0713     Wound Culture Culture in progress     Gram Stain Result Moderate (3+) WBCs seen      No organisms seen    Blood Culture [[id number]]  (Normal) Collected:  09/19/17 1824    Lab Status:  Preliminary result Specimen:  Blood from Arm, Left Updated:  09/20/17 2201     Blood Culture No growth at 24 hours    Blood Culture [[id number]]  (Normal) Collected:  09/19/17 1758    Lab Status:  Preliminary result Specimen:  Blood from Arm, Right Updated:  09/20/17 1901     Blood Culture No growth at 24 hours          Assessment:  Principal Problem:    Surgical wound infection  Active Problems:    COPD (chronic obstructive pulmonary disease)    Essential hypertension    Type 2 diabetes mellitus with complication    History of stroke    SVT (supraventricular tachycardia)    Hypokalemia    Chronic kidney disease, stage 3      Plan:  Continue present regimen of Zosyn and vancomycin until culture data is finalized.  It appears that she would need only 7-10 days of antibiotic treatment for soft tissue superficial surgical site infections complicated by anticoagulation therapy and development of hematoma.  We'll recommend final regimen in a day or so once the cultures are finalized.    Nigel Veronica MD  9/21/2017  8:03 AM    Much of this encounter note is an electronic transcription/translation of spoken language to printed text. The electronic translation of spoken language may permit erroneous, or at times, nonsensical words or phrases to be inadvertently transcribed; Although I have reviewed the note for such errors, some may still exist

## 2017-09-21 NOTE — PERIOPERATIVE NURSING NOTE
POP [facility]LE[facility] FOR DR MARTI RETURNED CALL. DISCUSSED WITH HER PATIENT HAS RECEIVED 3 UNITS OF FFP PER EPIC DOCUMENTATION WITH PRE-OPERATIVE PT/INR SHOWING 20.3/1.81. DISCUSSED WITH HER APPARENT ORDER TO TRANSFUSE 1 UNIT FFP POST OP, MINIMAL DRAIN OUTPUTS AT THIS TIME. SHE STATED TO WAIT AND DRAW A.M. LABS AS ORDERED AND INFORM DR MARTI IN A.M. OF PT/INR RESULTS AT THAT TIME

## 2017-09-21 NOTE — PROGRESS NOTES
"Pharmacokinetic Consult - Vancomycin Dosing (Follow-up Note)    Jaci Cervantes is on day 1 pharmacy to dose vancomycin for infected left hip surgical wound site per Dr. Marcello Reardon' request. Goal trough: 15-20 mg/L.     Relevant clinical data and objective history reviewed:  77 y.o. female 68\" (172.7 cm) 257 lb 3.2 oz (117 kg)  Approximately 4 weeks ago, the patient had an intramedullary nailing of an intertrochanteric left hip fracture. She developed persistent wound drainage consistent with possible infected hematoma/abscess. She is now s/p irrigation and debridement of left hip surgical wound site on 9/20.     Creatinine   Date Value Ref Range Status   09/21/2017 1.26 (H) 0.57 - 1.00 mg/dL Final   09/20/2017 1.43 (H) 0.57 - 1.00 mg/dL Final   09/19/2017 1.62 (H) 0.57 - 1.00 mg/dL Final     BUN   Date Value Ref Range Status   09/21/2017 18 8 - 23 mg/dL Final     Estimated Creatinine Clearance: 48.6 mL/min (by C-G formula based on Cr of 1.26).    Lab Results   Component Value Date    WBC 9.52 09/21/2017     Temp Readings from Last 3 Encounters:   09/21/17 97.6 °F (36.4 °C) (Oral)     Baseline culture/source/susceptibility:   9/19: Blood cultures x 2-NGTD  9/20: Wound culture-NGTD  9/20: Tissue/Bone cultures x 2-NGTD  9/20: Anaerobic cultures x 3-in process    IV Anti-Infectives     Ordered     Dose/Rate Route Frequency Start Stop    09/21/17 1025  vancomycin 1500 mg/500 mL 0.9% NS IVPB ([facility])     Ordering Provider:  Marcello Reardon Jr., MD    1,500 mg  over 150 Minutes Intravenous Every 24 Hours 09/21/17 1300      09/21/17 0026  piperacillin-tazobactam (ZOSYN) 3.375 g in iso-osmotic dextrose 50 ml (premix)     Ordering Provider:  Marcello Reardon Jr., MD    3.375 g  over 4 Hours Intravenous Every 8 Hours 09/21/17 0100       Assessment/Plan    1. Patient received a dose of vancomycin 1250 mg IV once early this morning around 0100. Will start a maintenance dose of 1500 mg (~13 mg/kg) IV q24h approximately 12 hours after " first dose. No trough has been ordered at this time.    2. Will monitor serum creatinine every 24 hours since patient is on both vancomycin and Zosyn. SCr was 1.26 this morning.     3. Patient is on continuous fluids, which will help to prevent toxic accumulation; monitor for s/sxn of toxicity including increase in SCr and decrease in UOP. RN/physician should monitor for signs and symptoms of nephrotoxicity and/or ototoxicity including tinnitus, roaring, ringing or buzzing in ears, or nausea/vomiting, dizziness, and vertigo.    4. Pharmacy will continue to follow daily while on vancomycin and adjust as needed.     Sophie Cooper, Pharm.D., BCPS

## 2017-09-21 NOTE — PLAN OF CARE
Problem: Patient Care Overview (Adult)  Goal: Plan of Care Review    09/21/17 1346   Outcome Evaluation   Outcome Summary/Follow up Plan PT is s/p I&D of L hip with evacuation of hematoma. Pt presents with generalized weakness, limited ROM and inability to walk. PT would benefit from PT to address these impairments. Pt plans to return to rehab at d/c.         Problem: Inpatient Physical Therapy  Goal: Bed Mobility Goal LTG- PT    09/21/17 1346   Bed Mobility PT LTG   Bed Mobility PT LTG, Date Established 09/21/17   Bed Mobility PT LTG, Time to Achieve 1 wk   Bed Mobility PT LTG, Activity Type all bed mobility   Bed Mobility PT LTG, Kane Level moderate assist (50% patient effort);2 person assist required       Goal: Transfer Training Goal 1 LTG- PT    09/21/17 1346   Transfer Training PT LTG   Transfer Training PT LTG, Date Established 09/21/17   Transfer Training PT LTG, Time to Achieve 1 wk   Transfer Training PT LTG, Activity Type all transfers;bed to chair /chair to bed   Transfer Training PT LTG, [place] Level moderate assist (50% patient effort);2 person assist required       Goal: Gait Training Goal LTG- PT    09/21/17 1346   Gait Training PT LTG   Gait Training Goal PT LTG, Date Established 09/21/17   Gait Training Goal PT LTG, Time to Achieve 1 wk   Gait Training Goal PT LTG, Kane Level moderate assist (50% patient effort);2 person assist required   Gait Training Goal PT LTG, Assist Device walker, rolling   Gait Training Goal PT LTG, Distance to Achieve 10 ft       Goal: Patient Education Goal LTG- PT    09/21/17 1346   Patient Education PT LTG   Patient Education PT LTG, Date Established 09/21/17   Patient Education PT LTG, Time to Achieve 1 wk   Patient Education PT LTG, Education Type HEP;precaution per surgeon   Patient Education PT LTG, Education Understanding demonstrate adequately

## 2017-09-21 NOTE — PERIOPERATIVE NURSING NOTE
"SPOKE WITH FLOOR RN AMEENA FROM 8P RE: PRE-OPERATIVE EVALUATION BY DR FERREIRA-CARDIOLOGY SERVICE AND FAMILY ASKING ABOUT TRANSFER TO  TELEMETRY BED POST OP. AMEENA STATED HE HAD BEEN INVOLVED IN FAMILY DISCUSSION BETWEEN PATIENT FAMILY AND CARDIOLOGY ARNP \"MACARIO\" RE: POST OP TELEMETRY BED; NO ORDERS NOTED FOR THIS TRANSFER. WILL CONTACT CARDIOLOGY SERVICE   "

## 2017-09-21 NOTE — ANESTHESIA POSTPROCEDURE EVALUATION
Patient: Jaci Cervantes    Procedure Summary     Date Anesthesia Start Anesthesia Stop Room / Location    09/20/17 1824 2028  [facility] /  [facility]       Procedure Diagnosis Surgeon Provider    Irrigation and debridement, left hip (Left Hip) Surgical wound infection, initial encounter  (Surgical wound infection, initial encounter [T81.4XXA]) MD Marcello Domínguez Jr., MD          Anesthesia Type: general  Last vitals  BP   147/67 (09/20/17 2130)    Temp   36.7 °C (98.1 °F) (09/20/17 2130)    Pulse   81 (09/20/17 2130)   Resp   20 (09/20/17 2130)    SpO2   97 % (09/20/17 2130)      Post Anesthesia Care and Evaluation    Patient location during evaluation: bedside  Patient participation: complete - patient participated  Level of consciousness: awake  Pain score: 1  Pain management: adequate  Airway patency: patent  Anesthetic complications: No anesthetic complications    Cardiovascular status: acceptable  Respiratory status: acceptable  Hydration status: acceptable    Comments: ---------------------------               09/20/17 2130         ---------------------------   BP:          147/67         Pulse:         81           Resp:          20           Temp:   36.7 °C (98.1 °F)   SpO2:          97%         ---------------------------

## 2017-09-21 NOTE — PROGRESS NOTES
Name: Jaci Cervantes ADMIT: 2017   : 1940  PCP: Stephan Felder MD    MRN: [id number] LOS: 2 days   AGE/SEX: 77 y.o. female  ROOM: Pending sale to [facility]/1   Subjective   Subjective  No new complaints.  Small soft BMs yesterday.  Family states no good BM in 2 weeks.  Occasional abdominal cramping.  No vomiting.    Objective   Vital Signs  Temp:  [97 °F (36.1 °C)-98.2 °F (36.8 °C)] 97.6 °F (36.4 °C)  Heart Rate:  [81-98] 85  Resp:  [14-20] 17  BP: (113-185)/(47-90) 162/78  SpO2:  [94 %-100 %] 97 %  on  Flow (L/min):  [2-10] 2;   O2 Device: nasal cannula  Body mass index is 39.11 kg/(m^2).    Physical Exam   Constitutional: She is oriented to person, place, and time. She appears well-developed. No distress.   Neck: No JVD present. No tracheal deviation present. No thyromegaly present.   Cardiovascular: Normal rate and regular rhythm.    No murmur heard.  Pulmonary/Chest: Effort normal and breath sounds normal. No respiratory distress.   Abdominal: Soft. Normal appearance and bowel sounds are normal. She exhibits no distension. There is no tenderness.   Musculoskeletal: Normal range of motion. She exhibits no edema.   Neurological: She is alert and oriented to person, place, and time.   Skin: Skin is warm and dry. No rash noted. She is not diaphoretic.   Left hip wound dressed, purulent drainage noted.   Nursing note and vitals reviewed.      Results Review:       I reviewed the patient's new clinical results.    Results from last 7 days  Lab Units 17  0357 17  0403 17  1504   WBC 10*3/mm3 9.52 10.39 10.94*   HEMOGLOBIN g/dL 8.1* 10.0* 10.0*   PLATELETS 10*3/mm3 154 186 218       Results from last 7 days  Lab Units 17  0357 17  1421 17  0403 17  1504   SODIUM mmol/L 141  --  139 137   POTASSIUM mmol/L 3.7 3.9 3.2* 3.5   CHLORIDE mmol/L 102  --  95* 93*   CO2 mmol/L 30.7*  --  29.7* 31.6*   BUN mg/dL 18  --  21 21   CREATININE mg/dL 1.26*  --  1.43* 1.62*   GLUCOSE mg/dL  94  --  116* 163*   Estimated Creatinine Clearance: 48.6 mL/min (by C-G formula based on Cr of 1.26).    Results from last 7 days  Lab Units 09/21/17  0357 09/20/17  0403 09/19/17  1504   CALCIUM mg/dL 8.7 9.2 9.5   ALBUMIN g/dL  --  3.30* 3.60   MAGNESIUM mg/dL 2.0 1.9  --        Results from last 7 days  Lab Units 09/20/17  1850 09/20/17  1848 09/20/17  1847 09/19/17  1824 09/19/17  1758   BLOODCX   --   --   --  No growth at 24 hours No growth at 24 hours   WOUNDCX   --   --  Culture in progress  --   --    GRAM STAIN RESULT  Many (4+) Red blood cells  Many (4+) WBCs seen  No organisms seen Many (4+) Red blood cells  Many (4+) WBCs seen  No organisms seen Moderate (3+) WBCs seen  No organisms seen  --   --        Results from last 7 days  Lab Units 09/21/17  0357 09/20/17  1756 09/20/17  1421 09/20/17  0403 09/19/17  1504   PROTIME Seconds 22.8* 20.3* 20.1* 25.9* 25.4*   INR  2.10* 1.81* 1.78* 2.46* 2.41*     Hemoglobin A1C   Date/Time Value Ref Range Status   09/20/2017 0403 5.63 (H) 4.80 - 5.60 % Final     Glucose   Date/Time Value Ref Range Status   09/21/2017 0615 103 70 - 130 mg/dL Final   09/20/2017 2145 112 70 - 130 mg/dL Final   09/20/2017 1601 116 70 - 130 mg/dL Final   09/20/2017 1202 129 70 - 130 mg/dL Final   09/20/2017 0540 115 70 - 130 mg/dL Final   09/19/2017 2035 212 (H) 70 - 130 mg/dL Final       Procedure: Irrigation and debridement, left hip  Date of Service: 9/20/2017  Surgeon: Marcello Reardon Jr., MD    CT SCAN OF THE LEFT HIP AND PELVIS  09/19/17  1. There has been internal fixation of the intertrochanteric fracture of  the left proximal femur with an intramedullary vladimir and intersecting  screw and the alignment appears satisfactory. There are several small  fracture fragments near the lesser trochanter and at the site of vladimir  insertion at the greater trochanter. There is a small amount of adjacent  hematoma and effacement of the subcutaneous soft tissues near the  operative site. There is  no large discrete hematoma present. There is no  deep hematoma within the pelvis.  2. There is moderate distention of the urinary bladder extending to the  level of the sacral promontory. The urinary bladder has a smooth  contour. There is a large amount of stool within the rectal ampulla  suggesting an element of fecal impaction.  3. The remainder of the bony pelvis is unremarkable. No other fractures  are seen.      atorvastatin 80 mg Oral Nightly   insulin aspart 0-9 Units Subcutaneous 4x Daily With Meals & Nightly   metoprolol tartrate 25 mg Oral Q12H   piperacillin-tazobactam 3.375 g Intravenous Q8H   sennosides-docusate sodium 2 tablet Oral Nightly   sertraline 100 mg Oral Daily   vancomycin 1,500 mg Intravenous Q24H       Pharmacy to dose vancomycin     sodium chloride 0.9 % with KCl 20 mEq 75 mL/hr Last Rate: 75 mL/hr (09/21/17 0112)   Diet Regular; Consistent Carbohydrate      Assessment/Plan   Assessment:     Active Hospital Problems (** Indicates Principal Problem)    Diagnosis Date Noted   • **Surgical wound infection [T81.4XXA] 09/19/2017   • Chronic kidney disease, stage 3 [N18.3] 09/20/2017   • History of stroke [Z86.73] 09/19/2017   • SVT (supraventricular tachycardia) [I47.1] 09/19/2017   • Type 2 diabetes mellitus with complication [E11.8] 03/04/2016   • COPD (chronic obstructive pulmonary disease) [J44.9] 03/04/2016   • Essential hypertension [I10] 03/04/2016      Resolved Hospital Problems    Diagnosis Date Noted Date Resolved   • Hypokalemia [E87.6] 09/20/2017 09/21/2017       Plan:   · POD#1 irrigation and debridement of left hip wound.  · ID and ortho following.  Cultures obtained intraoperatively pending.  Started on ZOSYN and VANCOMYCIN.    · Cardiology following.  · Monitor renal function.  Probable CKD3.  · Fecal impaction noted on abdominal x-ray.  Patient reports having very significant bowel movements yesterday and feels much better today.  Continue with stool softeners.      Matt  MD Ang  [facility]  09/21/17  8:58 AM

## 2017-09-21 NOTE — PLAN OF CARE
Problem: Patient Care Overview (Adult)  Goal: Plan of Care Review  Outcome: Ongoing (interventions implemented as appropriate)    09/20/17 2004   Coping/Psychosocial Response Interventions   Plan Of Care Reviewed With family;patient   Outcome Evaluation   Outcome Summary/Follow up Plan Sending patient to Pre Op, recieved 3 units of FFP, potassium 40mEq (result was 3.9 post), transferring to Telemetry post op.        Goal: Adult Individualization and Mutuality  Outcome: Ongoing (interventions implemented as appropriate)  Goal: Discharge Needs Assessment  Outcome: Ongoing (interventions implemented as appropriate)    Problem: Pain, Acute (Adult)  Goal: Acceptable Pain Control/Comfort Level  Outcome: Ongoing (interventions implemented as appropriate)

## 2017-09-21 NOTE — PROGRESS NOTES
Discharge Planning Assessment  [facility]     Patient Name: Jaci Cervantes  MRN: [id number]  Today's Date: 9/21/2017    Admit Date: 9/19/2017          Discharge Needs Assessment       09/21/17 1411    Living Environment    Lives With facility resident    Living Arrangements extended care facility;assisted living    Home Accessibility no concerns    Transportation Available family or friend will provide;car    Living Environment    Provides Primary Care For no one    Primary Care Provided By spouse/significant other    Quality Of Family Relationships supportive    Discharge Needs Assessment    Concerns To Be Addressed basic needs concerns;discharge planning concerns;home safety concerns    Readmission Within The Last 30 Days no previous admission in last 30 days    Outpatient/Agency/Support Group Needs skilled nursing facility (specify);assisted living facility (specify)    Anticipated Changes Related to Illness inability to care for self    Equipment Currently Used at Home hospital bed;wheelchair;walker, rolling    Discharge Facility/Level Of Care Needs nursing facility, skilled    Discharge Disposition still a patient            Discharge Plan       09/21/17 1422    Case Management/Social Work Plan    Additional Comments IMM 9/20    Spoke with patient's daughter at bedside. I introduced myself and explained CCP role.  Verified face sheet.  Confirmed that patient obtains his medication from [facility] on [facility].  Pt lives alone in a two story house.  He is in independent with ADL's.  He uses no DME. Pt has no history of Home Health.  He has been to rehab at [facility].  DC plan undetermined at this time.  He may choose to go home with  or to rehab.  Pt sleeps on first floor presently because of rib fractures.   Normally he uses the stairs to the basement or upstairs with no difficulty.  [facility]  CCP following      09/21/17 1412    Case  Management/Social Work Plan    Plan Susu Hemal    Additional Comments  9/19          Discharge Placement     Facility/Agency Request Status Selected? Address Phone Number Fax Number    [facility] Accepted    Yes [address] [facility] [address] [phone]        Diana Barry RN 9/20/2017 14:48    Called to [facility].                           Demographic Summary     None            Functional Status       09/21/17 1410    Functional Status Current    Ambulation 3-->assistive equipment and person    Transferring 3-->assistive equipment and person    Toileting 2-->assistive person    Bathing 2-->assistive person    Dressing 2-->assistive person    Eating 0-->independent    Communication 0-->understands/communicates without difficulty    Swallowing (if score 2 or more for any item, consult Rehab Services) 0-->swallows foods/liquids without difficulty    Change in Functional Status Since Onset of Current Illness/Injury yes    Functional Status Prior    Ambulation 3-->assistive equipment and person    Transferring 3-->assistive equipment and person    Toileting 2-->assistive person    Bathing 2-->assistive person    Dressing 2-->assistive person    Eating 4-->completely dependent    Communication 0-->understands/communicates without difficulty    Swallowing 0-->swallows foods/liquids without difficulty            Psychosocial     None            Abuse/Neglect     None            Legal     None            Substance Abuse     None            Patient Forms     None          Tonie Buckner RN

## 2017-09-22 LAB
ANION GAP SERPL CALCULATED.3IONS-SCNC: 7.2 MMOL/L
BUN BLD-MCNC: 16 MG/DL (ref 8–23)
BUN/CREAT SERPL: 12.9 (ref 7–25)
CALCIUM SPEC-SCNC: 8.8 MG/DL (ref 8.6–10.5)
CHLORIDE SERPL-SCNC: 102 MMOL/L (ref 98–107)
CO2 SERPL-SCNC: 30.8 MMOL/L (ref 22–29)
CREAT BLD-MCNC: 1.24 MG/DL (ref 0.57–1)
DEPRECATED RDW RBC AUTO: 55.7 FL (ref 37–54)
ERYTHROCYTE [DISTWIDTH] IN BLOOD BY AUTOMATED COUNT: 16.1 % (ref 11.7–13)
GFR SERPL CREATININE-BSD FRML MDRD: 42 ML/MIN/1.73
GLUCOSE BLD-MCNC: 117 MG/DL (ref 65–99)
GLUCOSE BLDC GLUCOMTR-MCNC: 126 MG/DL (ref 70–130)
GLUCOSE BLDC GLUCOMTR-MCNC: 131 MG/DL (ref 70–130)
GLUCOSE BLDC GLUCOMTR-MCNC: 165 MG/DL (ref 70–130)
GLUCOSE BLDC GLUCOMTR-MCNC: 168 MG/DL (ref 70–130)
HCT VFR BLD AUTO: 26.8 % (ref 35.6–45.5)
HGB BLD-MCNC: 8.3 G/DL (ref 11.9–15.5)
INR PPP: 2.21 (ref 0.9–1.1)
MCH RBC QN AUTO: 29.1 PG (ref 26.9–32)
MCHC RBC AUTO-ENTMCNC: 31 G/DL (ref 32.4–36.3)
MCV RBC AUTO: 94 FL (ref 80.5–98.2)
PLATELET # BLD AUTO: 138 10*3/MM3 (ref 140–500)
PMV BLD AUTO: 9.4 FL (ref 6–12)
POTASSIUM BLD-SCNC: 3.9 MMOL/L (ref 3.5–5.2)
PROTHROMBIN TIME: 23.8 SECONDS (ref 11.7–14.2)
RBC # BLD AUTO: 2.85 10*6/MM3 (ref 3.9–5.2)
SODIUM BLD-SCNC: 140 MMOL/L (ref 136–145)
WBC NRBC COR # BLD: 8.02 10*3/MM3 (ref 4.5–10.7)

## 2017-09-22 PROCEDURE — 85610 PROTHROMBIN TIME: CPT | Performed by: HOSPITALIST

## 2017-09-22 PROCEDURE — 80048 BASIC METABOLIC PNL TOTAL CA: CPT | Performed by: HOSPITALIST

## 2017-09-22 PROCEDURE — 82962 GLUCOSE BLOOD TEST: CPT

## 2017-09-22 PROCEDURE — 63710000001 INSULIN ASPART PER 5 UNITS: Performed by: INTERNAL MEDICINE

## 2017-09-22 PROCEDURE — 99232 SBSQ HOSP IP/OBS MODERATE 35: CPT | Performed by: INTERNAL MEDICINE

## 2017-09-22 PROCEDURE — 85027 COMPLETE CBC AUTOMATED: CPT | Performed by: HOSPITALIST

## 2017-09-22 PROCEDURE — 25010000002 VANCOMYCIN 10 G RECONSTITUTED SOLUTION: Performed by: ORTHOPAEDIC SURGERY

## 2017-09-22 PROCEDURE — 25010000002 PIPERACILLIN SOD-TAZOBACTAM PER 1 G: Performed by: ORTHOPAEDIC SURGERY

## 2017-09-22 RX ORDER — AMLODIPINE BESYLATE 5 MG/1
5 TABLET ORAL
Status: DISCONTINUED | OUTPATIENT
Start: 2017-09-22 | End: 2017-09-25 | Stop reason: HOSPADM

## 2017-09-22 RX ADMIN — HYDROCODONE BITARTRATE AND ACETAMINOPHEN 1 TABLET: 5; 325 TABLET ORAL at 15:35

## 2017-09-22 RX ADMIN — WARFARIN SODIUM 5 MG: 5 TABLET ORAL at 17:56

## 2017-09-22 RX ADMIN — SENNOSIDES AND DOCUSATE SODIUM 2 TABLET: 8.6; 5 TABLET ORAL at 20:07

## 2017-09-22 RX ADMIN — TAZOBACTAM SODIUM AND PIPERACILLIN SODIUM 3.38 G: 375; 3 INJECTION, SOLUTION INTRAVENOUS at 00:30

## 2017-09-22 RX ADMIN — METOPROLOL TARTRATE 25 MG: 25 TABLET ORAL at 10:04

## 2017-09-22 RX ADMIN — ATORVASTATIN CALCIUM 80 MG: 80 TABLET, FILM COATED ORAL at 20:07

## 2017-09-22 RX ADMIN — HYDROCODONE BITARTRATE AND ACETAMINOPHEN 1 TABLET: 5; 325 TABLET ORAL at 20:11

## 2017-09-22 RX ADMIN — METOPROLOL TARTRATE 25 MG: 25 TABLET ORAL at 20:07

## 2017-09-22 RX ADMIN — VANCOMYCIN HYDROCHLORIDE 1500 MG: 10 INJECTION, POWDER, LYOPHILIZED, FOR SOLUTION INTRAVENOUS at 15:32

## 2017-09-22 RX ADMIN — TAZOBACTAM SODIUM AND PIPERACILLIN SODIUM 3.38 G: 375; 3 INJECTION, SOLUTION INTRAVENOUS at 17:56

## 2017-09-22 RX ADMIN — AMLODIPINE BESYLATE 5 MG: 5 TABLET ORAL at 20:07

## 2017-09-22 RX ADMIN — INSULIN ASPART 2 UNITS: 100 INJECTION, SOLUTION INTRAVENOUS; SUBCUTANEOUS at 12:33

## 2017-09-22 RX ADMIN — TAZOBACTAM SODIUM AND PIPERACILLIN SODIUM 3.38 G: 375; 3 INJECTION, SOLUTION INTRAVENOUS at 10:04

## 2017-09-22 RX ADMIN — SERTRALINE 100 MG: 100 TABLET, FILM COATED ORAL at 10:04

## 2017-09-22 RX ADMIN — HYDROCODONE BITARTRATE AND ACETAMINOPHEN 1 TABLET: 5; 325 TABLET ORAL at 20:08

## 2017-09-22 RX ADMIN — INSULIN ASPART 2 UNITS: 100 INJECTION, SOLUTION INTRAVENOUS; SUBCUTANEOUS at 17:56

## 2017-09-22 NOTE — PROGRESS NOTES
Name: Jaci Cervantes ADMIT: 2017   : 1940  PCP: Stephan Felder MD    MRN: [id number] LOS: 3 days   AGE/SEX: 77 y.o. female  ROOM: [facility]/1   Subjective   Subjective  No new complaints.  Tolerating diet.  No abd discomfort.    Objective   Vital Signs  Temp:  [98 °F (36.7 °C)-99 °F (37.2 °C)] 98 °F (36.7 °C)  Heart Rate:  [82-94] 86  Resp:  [16] 16  BP: (145-182)/(58-86) 182/86  SpO2:  [95 %-99 %] 99 %  on  Flow (L/min):  [2] 2;   O2 Device: nasal cannula  Body mass index is 39.11 kg/(m^2).    Physical Exam   Constitutional: She is oriented to person, place, and time. She appears well-developed. No distress.   Cardiovascular: Normal rate and regular rhythm.    No murmur heard.  Pulmonary/Chest: Effort normal and breath sounds normal. No respiratory distress.   Abdominal: Soft. Normal appearance and bowel sounds are normal. She exhibits no distension. There is no tenderness.   Musculoskeletal: Normal range of motion. She exhibits no edema.   Neurological: She is alert and oriented to person, place, and time.   Skin: Skin is warm and dry. No rash noted. She is not diaphoretic.   Left hip wound dressed, purulent drainage noted.   Nursing note and vitals reviewed.      Results Review:       I reviewed the patient's new clinical results.    Results from last 7 days  Lab Units 17  0557 17  0357 17  0403 17  1504   WBC 10*3/mm3 8.02 9.52 10.39 10.94*   HEMOGLOBIN g/dL 8.3* 8.1* 10.0* 10.0*   PLATELETS 10*3/mm3 138* 154 186 218       Results from last 7 days  Lab Units 17  0557 17  0357 17  1421 17  0403 17  1504   SODIUM mmol/L 140 141  --  139 137   POTASSIUM mmol/L 3.9 3.7 3.9 3.2* 3.5   CHLORIDE mmol/L 102 102  --  95* 93*   CO2 mmol/L 30.8* 30.7*  --  29.7* 31.6*   BUN mg/dL 16 18  --  21 21   CREATININE mg/dL 1.24* 1.26*  --  1.43* 1.62*   GLUCOSE mg/dL 117* 94  --  116* 163*   Estimated Creatinine Clearance: 49.4 mL/min (by C-G formula based  on Cr of 1.24).    Results from last 7 days  Lab Units 09/22/17  0557 09/21/17  0357 09/20/17  0403 09/19/17  1504   CALCIUM mg/dL 8.8 8.7 9.2 9.5   ALBUMIN g/dL  --   --  3.30* 3.60   MAGNESIUM mg/dL  --  2.0 1.9  --        Results from last 7 days  Lab Units 09/20/17  1850 09/20/17  1848 09/20/17  1847 09/19/17  1824 09/19/17  1758   BLOODCX   --   --   --  No growth at 2 days No growth at 2 days   WOUNDCX   --   --    Scant growth (1+) Gram Positive Cocci*  Rare Gram Negative Bacilli*  --   --    GRAM STAIN RESULT  Many (4+) Red blood cells  Many (4+) WBCs seen  No organisms seen Many (4+) Red blood cells  Many (4+) WBCs seen  No organisms seen Moderate (3+) WBCs seen  No organisms seen  --   --        Results from last 7 days  Lab Units 09/22/17  0557 09/21/17  0357 09/20/17  1756 09/20/17  1421 09/20/17  0403 09/19/17  1504   PROTIME Seconds 23.8* 22.8* 20.3* 20.1* 25.9* 25.4*   INR  2.21* 2.10* 1.81* 1.78* 2.46* 2.41*     Hemoglobin A1C   Date/Time Value Ref Range Status   09/20/2017 0403 5.63 (H) 4.80 - 5.60 % Final     Glucose   Date/Time Value Ref Range Status   09/22/2017 1118 168 (H) 70 - 130 mg/dL Final   09/22/2017 0624 126 70 - 130 mg/dL Final   09/21/2017 2038 172 (H) 70 - 130 mg/dL Final   09/21/2017 1555 140 (H) 70 - 130 mg/dL Final   09/21/2017 1131 157 (H) 70 - 130 mg/dL Final   09/21/2017 0615 103 70 - 130 mg/dL Final   09/20/2017 2145 112 70 - 130 mg/dL Final   09/20/2017 1601 116 70 - 130 mg/dL Final       Procedure: Irrigation and debridement, left hip  Date of Service: 9/20/2017  Surgeon: Marcello Reardon Jr., MD    CT SCAN OF THE LEFT HIP AND PELVIS  09/19/17  1. There has been internal fixation of the intertrochanteric fracture of  the left proximal femur with an intramedullary vladimir and intersecting  screw and the alignment appears satisfactory. There are several small  fracture fragments near the lesser trochanter and at the site of vladimir  insertion at the greater trochanter. There is a  small amount of adjacent  hematoma and effacement of the subcutaneous soft tissues near the  operative site. There is no large discrete hematoma present. There is no  deep hematoma within the pelvis.  2. There is moderate distention of the urinary bladder extending to the  level of the sacral promontory. The urinary bladder has a smooth  contour. There is a large amount of stool within the rectal ampulla  suggesting an element of fecal impaction.  3. The remainder of the bony pelvis is unremarkable. No other fractures  are seen.      atorvastatin 80 mg Oral Nightly   insulin aspart 0-9 Units Subcutaneous 4x Daily With Meals & Nightly   metoprolol tartrate 25 mg Oral Q12H   piperacillin-tazobactam 3.375 g Intravenous Q8H   sennosides-docusate sodium 2 tablet Oral Nightly   sertraline 100 mg Oral Daily   vancomycin 1,500 mg Intravenous Q24H   warfarin 5 mg Oral Daily       Pharmacy to dose vancomycin     sodium chloride 0.9 % with KCl 20 mEq 75 mL/hr Last Rate: 75 mL/hr (09/21/17 1506)   Diet Regular; Consistent Carbohydrate      Assessment/Plan   Assessment:     Active Hospital Problems (** Indicates Principal Problem)    Diagnosis Date Noted   • **Surgical wound infection [T81.4XXA] 09/19/2017   • Chronic kidney disease, stage 3 [N18.3] 09/20/2017   • History of stroke [Z86.73] 09/19/2017   • SVT (supraventricular tachycardia) [I47.1] 09/19/2017   • Type 2 diabetes mellitus with complication [E11.8] 03/04/2016   • COPD (chronic obstructive pulmonary disease) [J44.9] 03/04/2016   • Essential hypertension [I10] 03/04/2016      Resolved Hospital Problems    Diagnosis Date Noted Date Resolved   • Hypokalemia [E87.6] 09/20/2017 09/21/2017       Plan:   · POD#2 irrigation and debridement of left hip wound.  · ID and ortho following.  Cultures obtained intraoperatively thus far growing Enterococcus.  On ZOSYN and VANCOMYCIN.    · Cardiology following.  INR therapeutic.  · BS stable.  · Monitor renal function.  Stable.   Probable CKD3.  · Fecal impaction clinically resolved.      Matt Fry MD  [facility]  09/22/17  12:56 PM

## 2017-09-22 NOTE — PROGRESS NOTES
[facility]  Cardiology Progress Note    Patient Identification:  Name: Jaci Cervantes  Age: 77 y.o.  Sex: female  :  1940  MRN: [id number]                 Follow Up / Chief Complaint: Preop clearance, NSVCT,  h/o nonsustained SVT. (On Coumadin for CVA )    Interval History:  Asked to clear patient for surgery- history of recent ORIF with surgical wound infection     Subjective:  Denies chest pain, pressure, palpitations or dizziness.  Denies shortness of breath or cough.  Reports moderate amount.  Surgical site pain    Objective:    Sinus with 6 beat run NSVT this afternoon  //86    Past Medical History:  Past Medical History:   Diagnosis Date   • COPD (chronic obstructive pulmonary disease)    • Diabetes mellitus    • History of transfusion    • Hyperlipidemia    • Hypertension    • Stroke 2017     Past Surgical History:  Past Surgical History:   Procedure Laterality Date   • HYSTERECTOMY     • INCISION AND DRAINAGE HIP Left 2017    Procedure: Irrigation and debridement, left hip;  Surgeon: Marcello Reardon Jr., MD;  Location: [facility];  Service:    • FL OPEN FIX INTER/SUBTROCH FX,IMPLNT Left 2017    Procedure: FEMUR INTRAMEDULLARY NAILING/RODDING;  Surgeon: Marcello Reardon Jr., MD;  Location: [facility];  Service: Orthopedics   • TUBAL ABDOMINAL LIGATION          Social History:   Social History   Substance Use Topics   • Smoking status: Never Smoker   • Smokeless tobacco: Never Used   • Alcohol use No      Family History:  History reviewed. No pertinent family history.       Allergies:  No Known Allergies  Scheduled Meds:    atorvastatin 80 mg Nightly   insulin aspart 0-9 Units 4x Daily With Meals & Nightly   metoprolol tartrate 25 mg Q12H   piperacillin-tazobactam 3.375 g Q8H   sennosides-docusate sodium 2 tablet Nightly   sertraline 100 mg Daily   vancomycin 1,500 mg Q24H   warfarin 5 mg Daily           INTAKE AND OUTPUT:    Intake/Output Summary  (Last 24 hours) at 09/22/17 1659  Last data filed at 09/22/17 1110   Gross per 24 hour   Intake              710 ml   Output              135 ml   Net              575 ml       Review of Systems:   GI: No nausea or vomiting  Cardiac:  No chest pain or palpitations  Pulmonary:  No shortness of breath or cough    Constitutional:  Temp:  [97.8 °F (36.6 °C)-98.8 °F (37.1 °C)] 97.8 °F (36.6 °C)  Heart Rate:  [77-94] 77  Resp:  [16] 16  BP: (145-182)/(58-86) 182/86    Physical Exam   General:  Awake, alert resting quietly.  Appears in no acute distress  Eyes: PERTL,  HEENT:  No JVD. Thyroid not visibly enlarged. No mucosal  cyanosis  Respiratory: Respirations regular and unlabored at rest. BBS with good air entry in all fields. No crackles or wheezes auscultated  Cardiovascular: S1S2 Regular rate and rhythm. No murmur   No pretibial pitting edema  Gastrointestinal: Abdomen soft, non tender. Bowel sounds present.  Musculoskeletal: CAMARA x4 with  Limited ROM LLE.  Sanguinous drainage and surgical drains x2  No abnormal movements  Extremities: No digital clubbing or cyanosis  Skin: Color pale, pink. Skin warm and dry to touch. No rashes    Neuro: AAO x3 CN II-XII grossly intact  Psych: Mood and affect normal, pleasant and cooperative        Cardiographics  Telemetry:           Echocardiogram 8-2017   · Left ventricular systolic function is normal. Calculated EF = 57.6%. Estimated EF was in agreement with the calculated EF. Estimated EF = 58%. Normal left ventricular cavity size noted. All left ventricular wall segments contract normally. Left ventricular wall thickness is consistent with mild concentric hypertrophy. Left ventricular diastolic dysfunction is noted (grade I a w/high LAP) consistent with impaired relaxation.  · Left atrial volume is moderately increased. Saline test results are negative.  · The aortic valve is not well visualized. The aortic valve is abnormal in structure. There is mild thickening of the aortic  "valve.        Lab Review         Results from last 7 days  Lab Units 09/21/17  0357   MAGNESIUM mg/dL 2.0       Results from last 7 days  Lab Units 09/22/17  0557   SODIUM mmol/L 140   POTASSIUM mmol/L 3.9   BUN mg/dL 16   CREATININE mg/dL 1.24*   CALCIUM mg/dL 8.8       Results from last 7 days  Lab Units 09/22/17  0557 09/21/17  0357 09/20/17  0403   WBC 10*3/mm3 8.02 9.52 10.39   HEMOGLOBIN g/dL 8.3* 8.1* 10.0*   HEMATOCRIT % 26.8* 26.3* 32.0*   PLATELETS 10*3/mm3 138* 154 186       Results from last 7 days  Lab Units 09/22/17  0557 09/21/17  0357 09/20/17  1756   INR  2.21* 2.10* 1.81*         Assessment:   - NSVT  - h/o non sustained SVT  - HTN  - EF 55-60%, gr 1 chavez dysfx  - h/o left ORIF  - s/p I&D left hip wound  - Anemia  - DM  - Dyslipidemia      Plan:  - NSVT -  electrolytes normal.  Check TSH    - HTN - poorly controlled on beta blocker.  Add Norvasc    - EF 55-60%, gr 1 chavez dysfx     Check mag, TSH.  Add Norvasc for improved BP control    Labs/tests ordered for am TSH, Mg      I reviewed the patient's new clinical results and treatment plan   I personally viewed and interpreted the patient's EKG/Telemetry data    )9/22/2017  Erlin Martinez MD      EMR Dragon/Transcription:   \"Dictated utilizing Dragon dictation\".     "

## 2017-09-22 NOTE — PROGRESS NOTES
"  Infectious Diseases Progress Note    Nigel Veronica MD     [facility]  Los: 3 days  Patient Identification:  Name: Jaci Cervantes  Age: 77 y.o.  Sex: female  :  1940  MRN: [id number]         Primary Care Physician: Stephan Felder MD            Subjective: No new complaints feeling about the same  Interval History: Underwent I&D of hematoma last night with placement of drains, and subsequent introduction of vancomycin and Zosyn.     Objective:    Scheduled Meds:    atorvastatin 80 mg Oral Nightly   insulin aspart 0-9 Units Subcutaneous 4x Daily With Meals & Nightly   metoprolol tartrate 25 mg Oral Q12H   piperacillin-tazobactam 3.375 g Intravenous Q8H   sennosides-docusate sodium 2 tablet Oral Nightly   sertraline 100 mg Oral Daily   vancomycin 1,500 mg Intravenous Q24H   warfarin 5 mg Oral Daily     Continuous Infusions:    Pharmacy to dose vancomycin     sodium chloride 0.9 % with KCl 20 mEq 75 mL/hr Last Rate: 75 mL/hr (17 1506)       Vital signs in last 24 hours:  Temp:  [98 °F (36.7 °C)-99 °F (37.2 °C)] 98 °F (36.7 °C)  Heart Rate:  [82-94] 86  Resp:  [16] 16  BP: (145-182)/(58-86) 182/86    Intake/Output:    Intake/Output Summary (Last 24 hours) at 17 0853  Last data filed at 17 0436   Gross per 24 hour   Intake             2570 ml   Output              135 ml   Net             2435 ml       Exam:  BP (!) 182/86 (BP Location: Right arm, Patient Position: Sitting)  Pulse 86  Temp 98 °F (36.7 °C) (Oral)   Resp 16  Ht 68\" (172.7 cm)  Wt 257 lb 3.2 oz (117 kg)  SpO2 99%  BMI 39.11 kg/m2    General Appearance:    Alert, cooperative, no distress, AAOx3, And smiling.                          Head:    Normocephalic, without obvious abnormality, atraumatic                           Eyes:    PERRL, conjunctiva/corneas clear, EOM's intact, both eyes                         Throat:   Lips, tongue, gums normal; oral mucosa pink and moist                           " Neck:   Supple, symmetrical, trachea midline, no JVD                         Lungs:    Clear to auscultation bilaterally, respirations unlabored                 Chest Wall:    No tenderness or deformity                          Heart:    Irregularly irregular rate and rhythm, S1 and S2 normal, no murmur,no  Rub                                      or gallop                  Abdomen:     Soft, non-tender, bowel sounds active, no masses, ecchymosis at the left lower quadrant area noted                 Extremities:   Significant oozing  at the left hip surgical site and dressing is soaked.                        Pulses:   Pulses palpable in all extremities                                            Data Review:    I reviewed the patient's new clinical results.    Results from last 7 days  Lab Units 09/22/17  0557 09/21/17  0357 09/20/17  0403 09/19/17  1504   WBC 10*3/mm3 8.02 9.52 10.39 10.94*   HEMOGLOBIN g/dL 8.3* 8.1* 10.0* 10.0*   PLATELETS 10*3/mm3 138* 154 186 218       Results from last 7 days  Lab Units 09/22/17  0557 09/21/17  0357 09/20/17  1421 09/20/17  0403 09/19/17  1504   SODIUM mmol/L 140 141  --  139 137   POTASSIUM mmol/L 3.9 3.7 3.9 3.2* 3.5   CHLORIDE mmol/L 102 102  --  95* 93*   CO2 mmol/L 30.8* 30.7*  --  29.7* 31.6*   BUN mg/dL 16 18  --  21 21   CREATININE mg/dL 1.24* 1.26*  --  1.43* 1.62*   CALCIUM mg/dL 8.8 8.7  --  9.2 9.5   GLUCOSE mg/dL 117* 94  --  116* 163*       Microbiology Results (last 10 days)     Procedure Component Value - Date/Time    Tissue/Bone Culture [[id number]]  (Abnormal) Collected:  09/20/17 1850    Lab Status:  Preliminary result Specimen:  Tissue from Hip, Left Updated:  09/22/17 1037     Tissue Culture --      Light growth (2+) Enterococcus species (A)     Gram Stain Result Many (4+) Red blood cells      Many (4+) WBCs seen      No organisms seen    Tissue/Bone Culture [[id number]]  (Abnormal) Collected:  09/20/17 1848    Lab Status:  Preliminary result Specimen:   Tissue from Hip, Left Updated:  09/22/17 1040     Tissue Culture --      Moderate growth (3+) Enterococcus species (A)     Gram Stain Result Many (4+) Red blood cells      Many (4+) WBCs seen      No organisms seen    Wound Culture [[id number]]  (Abnormal) Collected:  09/20/17 1847    Lab Status:  Preliminary result Specimen:  Wound from Hip, Left Updated:  09/22/17 1113     Wound Culture --      Scant growth (1+) Gram Positive Cocci (A)      Rare Gram Negative Bacilli (A)     Gram Stain Result Moderate (3+) WBCs seen      No organisms seen    Blood Culture [[id number]]  (Normal) Collected:  09/19/17 1824    Lab Status:  Preliminary result Specimen:  Blood from Arm, Left Updated:  09/21/17 2201     Blood Culture No growth at 2 days    Blood Culture [[id number]]  (Normal) Collected:  09/19/17 1758    Lab Status:  Preliminary result Specimen:  Blood from Arm, Right Updated:  09/21/17 1901     Blood Culture No growth at 2 days          Assessment:  Principal Problem:    Surgical wound infection  Active Problems:    COPD (chronic obstructive pulmonary disease)    Essential hypertension    Type 2 diabetes mellitus with complication    History of stroke    SVT (supraventricular tachycardia)    Chronic kidney disease, stage 3      Plan/Discussion:  Discussed with Dr. Reardon yesterday evening.  There is a common agreement that the infection and associated purulence is superficial, but there is a concern that there maybe deeper involvement when exploration was performed during I&D.    This is a valid concern and would require discussion about risks and benefits of approaching this process:  1- either a superficial soft tissue infection and hence treated for 7-10 maybe 14 days of treatment with antibiotic therapy from final I&D versus   2-approach it at this is a deep joint infection due to possibility of deeper structure involvement as a result of required wound exploration during surgery.    The second pathway is associated  with risk of complications from broad-spectrum antibiotic use for long period of time and its potential failure for intended prevention of infection.  Clinically she does not seem to have deep joint or bone involvement manifested as lack of pain or progressive decline in function of the left hip.  Pending the culture results will continue present antibiotics until the sensitivity data is available.  We will follow  Nigel Veronica MD  9/22/2017  8:53 AM    Much of this encounter note is an electronic transcription/translation of spoken language to printed text. The electronic translation of spoken language may permit erroneous, or at times, nonsensical words or phrases to be inadvertently transcribed; Although I have reviewed the note for such errors, some may still exist

## 2017-09-22 NOTE — PLAN OF CARE
Problem: Patient Care Overview (Adult)  Goal: Plan of Care Review  Outcome: Ongoing (interventions implemented as appropriate)    09/22/17 0511   Coping/Psychosocial Response Interventions   Plan Of Care Reviewed With patient   Patient Care Overview   Progress progress towards functional goals is fair   Outcome Evaluation   Outcome Summary/Follow up Plan CONTINUE ANTIBIOTICS, PT HAS BEEN STABLE AND WITHOUT COMPLAINT THIS SHIFT, CONTINUE PLAN OF CARE       Goal: Adult Individualization and Mutuality  Outcome: Ongoing (interventions implemented as appropriate)  Goal: Discharge Needs Assessment  Outcome: Ongoing (interventions implemented as appropriate)    Problem: Fall Risk (Adult)  Goal: Absence of Falls  Outcome: Ongoing (interventions implemented as appropriate)    Problem: Pain, Acute (Adult)  Goal: Acceptable Pain Control/Comfort Level  Outcome: Ongoing (interventions implemented as appropriate)    Problem: Pressure Ulcer Risk (Fitz Scale) (Adult,Obstetrics,Pediatric)  Goal: Skin Integrity  Outcome: Ongoing (interventions implemented as appropriate)

## 2017-09-22 NOTE — SIGNIFICANT NOTE
09/22/17 1605   Rehab Treatment   Discipline physical therapist   Treatment Not Performed patient/family declined treatment  (Pt refused. Screams in pain with attempted ROM despite recent pain medicine. Discussed with Rn, Aimee. Will follow up tomorrow.)   Recommendation   PT - Next Appointment 09/23/17

## 2017-09-23 LAB
BACTERIA SPEC AEROBE CULT: ABNORMAL
GLUCOSE BLDC GLUCOMTR-MCNC: 156 MG/DL (ref 70–130)
GLUCOSE BLDC GLUCOMTR-MCNC: 161 MG/DL (ref 70–130)
GLUCOSE BLDC GLUCOMTR-MCNC: 181 MG/DL (ref 70–130)
GLUCOSE BLDC GLUCOMTR-MCNC: 92 MG/DL (ref 70–130)
GRAM STN SPEC: ABNORMAL
INR PPP: 2.27 (ref 0.9–1.1)
MAGNESIUM SERPL-MCNC: 2.1 MG/DL (ref 1.6–2.4)
PROTHROMBIN TIME: 24.3 SECONDS (ref 11.7–14.2)
TSH SERPL DL<=0.05 MIU/L-ACNC: 5 MIU/ML (ref 0.27–4.2)

## 2017-09-23 PROCEDURE — 85610 PROTHROMBIN TIME: CPT | Performed by: HOSPITALIST

## 2017-09-23 PROCEDURE — 99231 SBSQ HOSP IP/OBS SF/LOW 25: CPT | Performed by: NURSE PRACTITIONER

## 2017-09-23 PROCEDURE — 84443 ASSAY THYROID STIM HORMONE: CPT | Performed by: NURSE PRACTITIONER

## 2017-09-23 PROCEDURE — 25010000002 PIPERACILLIN SOD-TAZOBACTAM PER 1 G: Performed by: ORTHOPAEDIC SURGERY

## 2017-09-23 PROCEDURE — 82962 GLUCOSE BLOOD TEST: CPT

## 2017-09-23 PROCEDURE — 63710000001 INSULIN ASPART PER 5 UNITS: Performed by: INTERNAL MEDICINE

## 2017-09-23 PROCEDURE — 97110 THERAPEUTIC EXERCISES: CPT

## 2017-09-23 PROCEDURE — 25010000002 VANCOMYCIN 10 G RECONSTITUTED SOLUTION: Performed by: ORTHOPAEDIC SURGERY

## 2017-09-23 PROCEDURE — 83735 ASSAY OF MAGNESIUM: CPT | Performed by: NURSE PRACTITIONER

## 2017-09-23 RX ADMIN — SERTRALINE 100 MG: 100 TABLET, FILM COATED ORAL at 09:35

## 2017-09-23 RX ADMIN — METOPROLOL TARTRATE 25 MG: 25 TABLET ORAL at 20:49

## 2017-09-23 RX ADMIN — VANCOMYCIN HYDROCHLORIDE 1500 MG: 10 INJECTION, POWDER, LYOPHILIZED, FOR SOLUTION INTRAVENOUS at 15:54

## 2017-09-23 RX ADMIN — AMLODIPINE BESYLATE 5 MG: 5 TABLET ORAL at 09:35

## 2017-09-23 RX ADMIN — ATORVASTATIN CALCIUM 80 MG: 80 TABLET, FILM COATED ORAL at 20:49

## 2017-09-23 RX ADMIN — TAZOBACTAM SODIUM AND PIPERACILLIN SODIUM 3.38 G: 375; 3 INJECTION, SOLUTION INTRAVENOUS at 19:53

## 2017-09-23 RX ADMIN — INSULIN ASPART 2 UNITS: 100 INJECTION, SOLUTION INTRAVENOUS; SUBCUTANEOUS at 18:53

## 2017-09-23 RX ADMIN — INSULIN ASPART 2 UNITS: 100 INJECTION, SOLUTION INTRAVENOUS; SUBCUTANEOUS at 11:44

## 2017-09-23 RX ADMIN — METOPROLOL TARTRATE 25 MG: 25 TABLET ORAL at 09:37

## 2017-09-23 RX ADMIN — TAZOBACTAM SODIUM AND PIPERACILLIN SODIUM 3.38 G: 375; 3 INJECTION, SOLUTION INTRAVENOUS at 13:16

## 2017-09-23 RX ADMIN — HYDROCODONE BITARTRATE AND ACETAMINOPHEN 1 TABLET: 5; 325 TABLET ORAL at 09:43

## 2017-09-23 RX ADMIN — TAZOBACTAM SODIUM AND PIPERACILLIN SODIUM 3.38 G: 375; 3 INJECTION, SOLUTION INTRAVENOUS at 04:34

## 2017-09-23 RX ADMIN — HYDROCODONE BITARTRATE AND ACETAMINOPHEN 1 TABLET: 5; 325 TABLET ORAL at 18:53

## 2017-09-23 RX ADMIN — WARFARIN SODIUM 5 MG: 5 TABLET ORAL at 18:53

## 2017-09-23 RX ADMIN — SENNOSIDES AND DOCUSATE SODIUM 2 TABLET: 8.6; 5 TABLET ORAL at 20:49

## 2017-09-23 RX ADMIN — INSULIN ASPART 2 UNITS: 100 INJECTION, SOLUTION INTRAVENOUS; SUBCUTANEOUS at 20:50

## 2017-09-23 NOTE — PLAN OF CARE
Problem: Patient Care Overview (Adult)  Goal: Plan of Care Review  Outcome: Ongoing (interventions implemented as appropriate)    09/23/17 8905   Coping/Psychosocial Response Interventions   Plan Of Care Reviewed With patient   Outcome Evaluation   Outcome Summary/Follow up Plan pt continues on zosyn and vancomycin IV as ordered, Drains taken out, bandages changed x2 and cleansed with sterile saline solution.Staples intact. No complaints of pain, calm cooperative. Continues on 2L O2, will continue to monitor.        Goal: Adult Individualization and Mutuality  Outcome: Ongoing (interventions implemented as appropriate)    Problem: Fall Risk (Adult)  Goal: Absence of Falls  Outcome: Ongoing (interventions implemented as appropriate)    Problem: Pain, Acute (Adult)  Goal: Acceptable Pain Control/Comfort Level  Outcome: Ongoing (interventions implemented as appropriate)    Problem: Pressure Ulcer Risk (Fitz Scale) (Adult,Obstetrics,Pediatric)  Goal: Skin Integrity  Outcome: Ongoing (interventions implemented as appropriate)

## 2017-09-23 NOTE — PLAN OF CARE
Problem: Patient Care Overview (Adult)  Goal: Adult Individualization and Mutuality  Outcome: Ongoing (interventions implemented as appropriate)  Pt eating well-family visiting intermittently-medicated x1 with pain medication-receiving antibiotics as ordered-family updated pt progressing    Problem: Fall Risk (Adult)  Goal: Absence of Falls  Outcome: Ongoing (interventions implemented as appropriate)  No falls    Problem: Pressure Ulcer Risk (Fitz Scale) (Adult,Obstetrics,Pediatric)  Goal: Skin Integrity  Outcome: Ongoing (interventions implemented as appropriate)  Pt turned every 2 hours-too right side and back-dressing changed and area cleaned with normal saline to remove dried blood and 4x4's , ABD and tatpe-2 drains noted drg serous drg-pt medicated wth 1 lotabat 1530 with good relief

## 2017-09-23 NOTE — PROGRESS NOTES
Name: Jaci Cervantes ADMIT: 2017   : 1940  PCP: Stephan Felder MD    MRN: [id number] LOS: 4 days   AGE/SEX: 77 y.o. female  ROOM: [facility]/1   Subjective   Subjective  No new complaints.  Tolerating diet.  No abd discomfort.    Objective   Vital Signs  Temp:  [97.8 °F (36.6 °C)-98.2 °F (36.8 °C)] 97.8 °F (36.6 °C)  Heart Rate:  [70-78] 78  Resp:  [16-18] 18  BP: (154-171)/(80-90) 171/82  SpO2:  [98 %-100 %] 99 %  on  Flow (L/min):  [2] 2;   O2 Device: nasal cannula  Body mass index is 39.11 kg/(m^2).    Physical Exam   Constitutional: She is oriented to person, place, and time. She appears well-developed. No distress.   Cardiovascular: Normal rate and regular rhythm.    No murmur heard.  Pulmonary/Chest: Effort normal and breath sounds normal. No respiratory distress.   Abdominal: Soft. Normal appearance and bowel sounds are normal. She exhibits no distension. There is no tenderness.   Musculoskeletal: Normal range of motion. She exhibits no edema.   Neurological: She is alert and oriented to person, place, and time.   Skin: Skin is warm and dry. No rash noted. She is not diaphoretic.   Left hip wound dressed, purulent drainage noted.   Nursing note and vitals reviewed.      Results Review:       I reviewed the patient's new clinical results.    Results from last 7 days  Lab Units 17  0557 17  0357 17  0403 17  1504   WBC 10*3/mm3 8.02 9.52 10.39 10.94*   HEMOGLOBIN g/dL 8.3* 8.1* 10.0* 10.0*   PLATELETS 10*3/mm3 138* 154 186 218       Results from last 7 days  Lab Units 17  0557 17  0357 17  1421 17  0403 17  1504   SODIUM mmol/L 140 141  --  139 137   POTASSIUM mmol/L 3.9 3.7 3.9 3.2* 3.5   CHLORIDE mmol/L 102 102  --  95* 93*   CO2 mmol/L 30.8* 30.7*  --  29.7* 31.6*   BUN mg/dL 16 18  --  21 21   CREATININE mg/dL 1.24* 1.26*  --  1.43* 1.62*   GLUCOSE mg/dL 117* 94  --  116* 163*   Estimated Creatinine Clearance: 49.4 mL/min (by C-G  formula based on Cr of 1.24).    Results from last 7 days  Lab Units 09/23/17  0546 09/22/17  0557 09/21/17  0357 09/20/17  0403 09/19/17  1504   CALCIUM mg/dL  --  8.8 8.7 9.2 9.5   ALBUMIN g/dL  --   --   --  3.30* 3.60   MAGNESIUM mg/dL 2.1  --  2.0 1.9  --        Results from last 7 days  Lab Units 09/20/17  1850 09/20/17  1848 09/20/17  1847 09/19/17  1824 09/19/17  1758   BLOODCX   --   --   --  No growth at 3 days No growth at 3 days   WOUNDCX   --   --    Scant growth (1+) Enterococcus faecalis*  Rare Pseudomonas aeruginosa*  --   --    GRAM STAIN RESULT  Many (4+) Red blood cells  Many (4+) WBCs seen  No organisms seen Many (4+) Red blood cells  Many (4+) WBCs seen  No organisms seen Moderate (3+) WBCs seen  No organisms seen  --   --        Results from last 7 days  Lab Units 09/23/17  0546 09/22/17  0557 09/21/17  0357 09/20/17  1756 09/20/17  1421 09/20/17  0403 09/19/17  1504   PROTIME Seconds 24.3* 23.8* 22.8* 20.3* 20.1* 25.9* 25.4*   INR  2.27* 2.21* 2.10* 1.81* 1.78* 2.46* 2.41*     Glucose   Date/Time Value Ref Range Status   09/23/2017 0654 92 70 - 130 mg/dL Final   09/22/2017 2004 131 (H) 70 - 130 mg/dL Final   09/22/2017 1645 165 (H) 70 - 130 mg/dL Final   09/22/2017 1118 168 (H) 70 - 130 mg/dL Final   09/22/2017 0624 126 70 - 130 mg/dL Final   09/21/2017 2038 172 (H) 70 - 130 mg/dL Final   09/21/2017 1555 140 (H) 70 - 130 mg/dL Final   09/21/2017 1131 157 (H) 70 - 130 mg/dL Final       Procedure: Irrigation and debridement, left hip  Date of Service: 9/20/2017  Surgeon: Marcello Reardon Jr., MD    CT SCAN OF THE LEFT HIP AND PELVIS  09/19/17  1. There has been internal fixation of the intertrochanteric fracture of  the left proximal femur with an intramedullary vladimir and intersecting  screw and the alignment appears satisfactory. There are several small  fracture fragments near the lesser trochanter and at the site of vladimir  insertion at the greater trochanter. There is a small amount of  adjacent  hematoma and effacement of the subcutaneous soft tissues near the  operative site. There is no large discrete hematoma present. There is no  deep hematoma within the pelvis.  2. There is moderate distention of the urinary bladder extending to the  level of the sacral promontory. The urinary bladder has a smooth  contour. There is a large amount of stool within the rectal ampulla  suggesting an element of fecal impaction.  3. The remainder of the bony pelvis is unremarkable. No other fractures  are seen.      amLODIPine 5 mg Oral Q24H   atorvastatin 80 mg Oral Nightly   insulin aspart 0-9 Units Subcutaneous 4x Daily With Meals & Nightly   metoprolol tartrate 25 mg Oral Q12H   piperacillin-tazobactam 3.375 g Intravenous Q8H   sennosides-docusate sodium 2 tablet Oral Nightly   sertraline 100 mg Oral Daily   vancomycin 1,500 mg Intravenous Q24H   warfarin 5 mg Oral Daily       Pharmacy to dose vancomycin    Diet Regular; Consistent Carbohydrate      Assessment/Plan   Assessment:     Active Hospital Problems (** Indicates Principal Problem)    Diagnosis Date Noted   • **Surgical wound infection [T81.4XXA] 09/19/2017   • Chronic kidney disease, stage 3 [N18.3] 09/20/2017   • History of stroke [Z86.73] 09/19/2017   • SVT (supraventricular tachycardia) [I47.1] 09/19/2017   • Type 2 diabetes mellitus with complication [E11.8] 03/04/2016   • COPD (chronic obstructive pulmonary disease) [J44.9] 03/04/2016   • Essential hypertension [I10] 03/04/2016      Resolved Hospital Problems    Diagnosis Date Noted Date Resolved   • Hypokalemia [E87.6] 09/20/2017 09/21/2017       Plan:   · POD#3 irrigation and debridement of left hip wound.  · ID and ortho following.  Cultures obtained intraoperatively thus far growing Enterococcus/Pseudomonas.  On ZOSYN and VANCOMYCIN.    · Cardiology following.  INR therapeutic.  · BS stable.  · Monitor renal function.  Stable.  Probable CKD3.  · Will return to assisted living at [place] [facility] at discharge.      Matt Fry MD  [facility]  09/23/17  10:38 AM

## 2017-09-23 NOTE — PROGRESS NOTES
"  Infectious Diseases Progress Note    Nigel Veronica MD     [facility]  Los: 4 days  Patient Identification:  Name: Jaci Cervantes  Age: 77 y.o.  Sex: female  :  1940  MRN: [id number]         Primary Care Physician: Stephan Felder MD            Subjective: Doing better denies any specific complaints.  Interval History: Underwent I&D of hematoma last night with placement of drains, and subsequent introduction of vancomycin and Zosyn.     Objective:    Scheduled Meds:    amLODIPine 5 mg Oral Q24H   atorvastatin 80 mg Oral Nightly   insulin aspart 0-9 Units Subcutaneous 4x Daily With Meals & Nightly   metoprolol tartrate 25 mg Oral Q12H   piperacillin-tazobactam 3.375 g Intravenous Q8H   sennosides-docusate sodium 2 tablet Oral Nightly   sertraline 100 mg Oral Daily   vancomycin 1,500 mg Intravenous Q24H   warfarin 5 mg Oral Daily     Continuous Infusions:    Pharmacy to dose vancomycin        Vital signs in last 24 hours:  Temp:  [97.8 °F (36.6 °C)-98.5 °F (36.9 °C)] 98.5 °F (36.9 °C)  Heart Rate:  [70-79] 79  Resp:  [16-18] 16  BP: (153-171)/(75-90) 153/75    Intake/Output:    Intake/Output Summary (Last 24 hours) at 17 1603  Last data filed at 17 1554   Gross per 24 hour   Intake             1230 ml   Output               90 ml   Net             1140 ml       Exam:  /75 (BP Location: Left arm, Patient Position: Lying)  Pulse 79  Temp 98.5 °F (36.9 °C) (Oral)   Resp 16  Ht 68\" (172.7 cm)  Wt 257 lb 3.2 oz (117 kg)  SpO2 99%  BMI 39.11 kg/m2    General Appearance:    Alert, cooperative, no distress, AAOx3, And smiling.                          Head:    Normocephalic, without obvious abnormality, atraumatic                           Eyes:    PERRL, conjunctiva/corneas clear, EOM's intact, both eyes                         Throat:   Lips, tongue, gums normal; oral mucosa pink and moist                           Neck:   Supple, symmetrical, trachea midline, no JVD   "                       Lungs:    Clear to auscultation bilaterally, respirations unlabored                 Chest Wall:    No tenderness or deformity                          Heart:    Irregularly irregular rate and rhythm, S1 and S2 normal, no murmur,no  Rub                                      or gallop                  Abdomen:     Soft, non-tender, bowel sounds active, no masses, ecchymosis at the left lower quadrant area noted                 Extremities:   Hemolytic drains are removed and I&D site are dressed and there is no  cellulitis                        Pulses:   Pulses palpable in all extremities                                            Data Review:    I reviewed the patient's new clinical results.    Results from last 7 days  Lab Units 09/22/17  0557 09/21/17  0357 09/20/17  0403 09/19/17  1504   WBC 10*3/mm3 8.02 9.52 10.39 10.94*   HEMOGLOBIN g/dL 8.3* 8.1* 10.0* 10.0*   PLATELETS 10*3/mm3 138* 154 186 218       Results from last 7 days  Lab Units 09/22/17  0557 09/21/17  0357 09/20/17  1421 09/20/17  0403 09/19/17  1504   SODIUM mmol/L 140 141  --  139 137   POTASSIUM mmol/L 3.9 3.7 3.9 3.2* 3.5   CHLORIDE mmol/L 102 102  --  95* 93*   CO2 mmol/L 30.8* 30.7*  --  29.7* 31.6*   BUN mg/dL 16 18  --  21 21   CREATININE mg/dL 1.24* 1.26*  --  1.43* 1.62*   CALCIUM mg/dL 8.8 8.7  --  9.2 9.5   GLUCOSE mg/dL 117* 94  --  116* 163*       Microbiology Results (last 10 days)     Procedure Component Value - Date/Time    Anaerobic Culture [[id number]]  (Normal) Collected:  09/20/17 1850    Lab Status:  Preliminary result Specimen:  Tissue from Hip, Left Updated:  09/23/17 1210     Culture No anaerobes isolated at 3 days    Tissue/Bone Culture [[id number]]  (Abnormal)  (Susceptibility) Collected:  09/20/17 1850    Lab Status:  Final result Specimen:  Tissue from Hip, Left Updated:  09/23/17 0933     Tissue Culture --      Light growth (2+) Enterococcus faecalis (A)     Gram Stain Result Many (4+) Red blood cells       Many (4+) WBCs seen      No organisms seen    Susceptibility      Enterococcus faecalis     MIGUEL     Ampicillin <=2 ug/ml Susceptible     Gentamicin High Level Synergy SYN-S  Susceptible     Vancomycin 1 ug/ml Susceptible                    Anaerobic Culture [[id number]]  (Normal) Collected:  09/20/17 1848    Lab Status:  Preliminary result Specimen:  Tissue from Hip, Left Updated:  09/23/17 1211     Culture No anaerobes isolated at 3 days    Tissue/Bone Culture [[id number]]  (Abnormal) Collected:  09/20/17 1848    Lab Status:  Preliminary result Specimen:  Tissue from Hip, Left Updated:  09/22/17 1040     Tissue Culture --      Moderate growth (3+) Enterococcus species (A)     Gram Stain Result Many (4+) Red blood cells      Many (4+) WBCs seen      No organisms seen    Anaerobic Culture [[id number]]  (Normal) Collected:  09/20/17 1847    Lab Status:  Preliminary result Specimen:  Wound from Hip, Left Updated:  09/23/17 1210     Culture No anaerobes isolated at 3 days    Wound Culture [[id number]]  (Abnormal)  (Susceptibility) Collected:  09/20/17 1847    Lab Status:  Final result Specimen:  Wound from Hip, Left Updated:  09/23/17 0923     Wound Culture --      Scant growth (1+) Enterococcus faecalis (A)      Rare Pseudomonas aeruginosa (A)     Gram Stain Result Moderate (3+) WBCs seen      No organisms seen    Susceptibility      Enterococcus faecalis     MIGUEL     Ampicillin <=2 ug/ml Susceptible     Gentamicin High Level Synergy SYN-S  Susceptible     Vancomycin 1 ug/ml Susceptible                Susceptibility      Pseudomonas aeruginosa     MIGUEL     Cefepime <=1 ug/ml Susceptible     Ceftazidime <=1 ug/ml Susceptible     Ciprofloxacin <=0.25 ug/ml Susceptible     Levofloxacin 0.5 ug/ml Susceptible     Meropenem <=0.25 ug/ml Susceptible     Piperacillin + Tazobactam <=4 ug/ml Susceptible     Tobramycin <=1 ug/ml Susceptible                    Blood Culture [[id number]]  (Normal) Collected:  09/19/17 1824    Lab  Status:  Preliminary result Specimen:  Blood from Arm, Left Updated:  09/22/17 2201     Blood Culture No growth at 3 days    Blood Culture [[id number]]  (Normal) Collected:  09/19/17 1758    Lab Status:  Preliminary result Specimen:  Blood from Arm, Right Updated:  09/22/17 1901     Blood Culture No growth at 3 days          Assessment:  Principal Problem:    Surgical wound infection  Active Problems:    COPD (chronic obstructive pulmonary disease)    Essential hypertension    Type 2 diabetes mellitus with complication    History of stroke    SVT (supraventricular tachycardia)    Chronic kidney disease, stage 3      Plan/Discussion:  See my discussion on 9/22/2017.  Based on culture data her antibiotic regimen can be switched to much simpler program at the time of discharge consisting of Levaquin and Augmentin to address enterococcus and Pseudomonas for total of 14 days from final I&D.  If orthopedic surgery service, based on the intraoperative assessment, think that there is a concern for deeper structure involvement then above regimen can be extended to 6 weeks.  Obviously in that scenario she needs to be monitored very closely with weekly labs consisting of CBC CMP ESR and CRP and her INR needs to be monitored very closely because of the interaction as mentioned above.  Nigel Veronica MD  9/23/2017  4:03 PM    Much of this encounter note is an electronic transcription/translation of spoken language to printed text. The electronic translation of spoken language may permit erroneous, or at times, nonsensical words or phrases to be inadvertently transcribed; Although I have reviewed the note for such errors, some may still exist

## 2017-09-23 NOTE — PLAN OF CARE
Problem: Patient Care Overview (Adult)  Goal: Plan of Care Review  Outcome: Ongoing (interventions implemented as appropriate)    09/23/17 0531   Coping/Psychosocial Response Interventions   Plan Of Care Reviewed With patient   Patient Care Overview   Progress progress towards functional goals is fair   Outcome Evaluation   Outcome Summary/Follow up Plan PT WITHOUT DISTRESS THIS SHIFT, CONTINUE PLAN OF CARE       Goal: Adult Individualization and Mutuality  Outcome: Ongoing (interventions implemented as appropriate)  Goal: Discharge Needs Assessment  Outcome: Ongoing (interventions implemented as appropriate)    Problem: Fall Risk (Adult)  Goal: Absence of Falls  Outcome: Ongoing (interventions implemented as appropriate)    Problem: Pain, Acute (Adult)  Goal: Acceptable Pain Control/Comfort Level  Outcome: Ongoing (interventions implemented as appropriate)    Problem: Pressure Ulcer Risk (Fitz Scale) (Adult,Obstetrics,Pediatric)  Goal: Skin Integrity  Outcome: Ongoing (interventions implemented as appropriate)

## 2017-09-23 NOTE — PLAN OF CARE
Problem: Patient Care Overview (Adult)  Goal: Plan of Care Review  Outcome: Ongoing (interventions implemented as appropriate)    09/23/17 1350   Coping/Psychosocial Response Interventions   Plan Of Care Reviewed With patient   Patient Care Overview   Progress progress toward functional goals as expected   Outcome Evaluation   Outcome Summary/Follow up Plan Pt without significant c/o L hip pain during PT except when atetmpting to sidestep as pt is unable to adequately unweigh her R LE to sidestep right. Participated well in L LE therex ; Mod/Max A x 2 for functional mobiilty. Plan is to returnt o SNF rehab.

## 2017-09-23 NOTE — PROGRESS NOTES
"Orthopaedic Surgery  Daily Progress Note    /83 (BP Location: Left arm, Patient Position: Lying)  Pulse 70  Temp 98.2 °F (36.8 °C) (Oral)   Resp 16  Ht 68\" (172.7 cm)  Wt 257 lb 3.2 oz (117 kg)  SpO2 98%  BMI 39.11 kg/m2          SUBJECTIVE  Patient feels well, no pain in hip    PHYSICAL EXAM  Resting in NAD.   A&O x3.    Dressings c/d/i.  Incisions examined.  No drainage, no palpable hematoma.  HVs in place, holding suction    Active ankle ankle DF and PF  Reports sensation intact to light touch throughout  Foot/toes warm, perfused, palpable DP  No pain with PROM of hip     Principal Problem:    Surgical wound infection  Active Problems:    COPD (chronic obstructive pulmonary disease)    Essential hypertension    Type 2 diabetes mellitus with complication    History of stroke    SVT (supraventricular tachycardia)    Chronic kidney disease, stage 3      PLAN / DISPOSITION:  76 y/o female POD 3 s/p I&D of left hip with evacuation of infected hematoma.    Cultures growing enterococcus, sensitivity pending.    1. Pain control: Orals  2.  Antibiotics: Continue empiric coverage with vanc/zosyn; follow up culture data, narrow coverage as directed by cultures/ID; appreciate Dr. Veronica's care/expertise.  3.  PT: WBAT, no restrictions  4. DVT: Coumadin; CAIT/SCDs, mobilization  5.  Will remove drains today, daily dry dressing changes prn  6. Postop anemia: Hb 8.3 yesterday, VSS; will monitor  7. Dispo: pending    Marcello Reardon Jr, MD  09/23/17  6:24 AM  "

## 2017-09-23 NOTE — THERAPY TREATMENT NOTE
Acute Care - Physical Therapy Treatment Note  [facility]     Patient Name: Jaci Cervantes  : 1940  MRN: [id number]  Today's Date: 2017             Admit Date: 2017    Visit Dx:    ICD-10-CM ICD-9-CM   1. Surgical wound infection, initial encounter T81.4XXA 998.59   2. Fecal impaction K56.41 560.32   3. Difficulty walking R26.2 719.7     Patient Active Problem List   Diagnosis   • COPD (chronic obstructive pulmonary disease)   • Influenza A   • Essential hypertension   • Type 2 diabetes mellitus with complication   • GERD (gastroesophageal reflux disease)   • Anxiety   • Closed fracture of neck of left femur   • MAURICE (acute kidney injury)   • Acute post-hemorrhagic anemia   • Acute embolic stroke   • Right lower lobe pneumonia   • Surgical wound infection   • History of stroke   • SVT (supraventricular tachycardia)   • Chronic kidney disease, stage 3               Adult Rehabilitation Note       17 1344          Rehab Assessment/Intervention    Discipline physical therapist  -DM      Document Type therapy note (daily note)  -DM      Subjective Information no complaints;agree to therapy  -DM      Patient Effort, Rehab Treatment good  -DM      Symptoms Noted During/After Treatment none  -DM      Precautions/Limitations fall precautions;other (see comments)   per chart pt is WBAT, pt is 50% WB  -DM      Patient Response to Treatment good  -DM      Recorded by [DM] Madeline Coelho, PT      Pain Assessment    Pain Assessment 0-10  -DM      Pain Score 2  -DM      Post Pain Score 2  -DM      Pain Type Acute pain  -DM      Pain Location Hip  -DM      Pain Orientation Left  -DM      Pain Intervention(s) Repositioned;Ambulation/increased activity;Rest  -DM      Response to Interventions tolerated  -DM      Recorded by [DM] Madeline Coelho, PT      Vision Assessment/Intervention    Visual Impairment WFL  -DM      Recorded by [DM] Madeline Coelho, PT      Cognitive Assessment/Intervention    Current  Cognitive/Communication Assessment functional  -DM      Orientation Status oriented to;person;place;situation  -DM      Follows Commands/Answers Questions 100% of the time  -DM      Personal Safety WNL/WFL  -DM      Personal Safety Interventions fall prevention program maintained;gait belt;muscle strengthening facilitated;nonskid shoes/slippers when out of bed;supervised activity  -DM      Recorded by [DM] Madeline Coelho, PT      Bed Mobility, Assessment/Treatment    Bed Mobility, Assistive Device bed rails;draw sheet  -DM      Bed Mobility, Roll Right, [place] minimum assist (75% patient effort);verbal cues required;other (see comments)   pillow between knees  -DM      Bed Mob, Sidelying to Sit, [place] moderate assist (50% patient effort);2 person assist required;verbal cues required  -DM      Bed Mob, Sit to Sidelying, [place] 2 person assist required;maximum assist (25% patient effort);verbal cues required  -DM      Bed Mobility, Safety Issues decreased use of legs for bridging/pushing  -DM      Bed Mobility, Impairments strength decreased;pain  -DM      Recorded by [DM] Madeline Coelho, PT      Transfer Assessment/Treatment    Transfers, Sit-Stand [place] moderate assist (50% patient effort);2 person assist required;verbal cues required  -DM      Transfers, Stand-Sit [place] moderate assist (50% patient effort);2 person assist required;verbal cues required  -DM      Transfers, Sit-Stand-Sit, Assist Device rolling walker;elevated surface  -DM      Transfer, Safety Issues balance decreased during turns;step length decreased  -DM      Transfer, Impairments strength decreased;impaired balance  -DM      Transfer, Comment 2 standing trials  -DM      Recorded by [DM] Madeline Coelho, PT      Gait Assessment/Treatment    Gait, Comment pt was unable to unweigh R LE to attempt sidestep w/RW  -DM      Recorded by [DM] Madeline Coelho, PT      Therapy Exercises    Exercise Protocols hip ORIF  -DM      Hip  ORIF Exercises 10 reps;left:;completed protocol;with assist  -DM      Recorded by [DM] Madeline Coelho, PT      Positioning and Restraints    Pre-Treatment Position in bed  -DM      Post Treatment Position bed  -DM      In Bed notified nsg;fowlers;call light within reach;encouraged to call for assist;exit alarm on;LLE elevated  -DM      Recorded by [DM] Madeline Coelho, PT        User Key  (r) = Recorded By, (t) = Taken By, (c) = Cosigned By    Initials Name Effective Dates    DM Madeline Coelho, PT 10/06/15 -                 IP PT Goals       09/21/17 1346          Bed Mobility PT LTG    Bed Mobility PT LTG, Date Established 09/21/17  -KH      Bed Mobility PT LTG, Time to Achieve 1 wk  -KH      Bed Mobility PT LTG, Activity Type all bed mobility  -KH      Bed Mobility PT LTG, [place] Level moderate assist (50% patient effort);2 person assist required  -KH      Transfer Training PT LTG    Transfer Training PT LTG, Date Established 09/21/17  -KH      Transfer Training PT LTG, Time to Achieve 1 wk  -KH      Transfer Training PT LTG, Activity Type all transfers;bed to chair /chair to bed  -KH      Transfer Training PT LTG, [place] Level moderate assist (50% patient effort);2 person assist required  -KH      Gait Training PT LTG    Gait Training Goal PT LTG, Date Established 09/21/17  -KH      Gait Training Goal PT LTG, Time to Achieve 1 wk  -KH      Gait Training Goal PT LTG, [place] Level moderate assist (50% patient effort);2 person assist required  -KH      Gait Training Goal PT LTG, Assist Device walker, rolling  -KH      Gait Training Goal PT LTG, Distance to Achieve 10 ft  -KH      Patient Education PT LTG    Patient Education PT LTG, Date Established 09/21/17  -KH      Patient Education PT LTG, Time to Achieve 1 wk  -KH      Patient Education PT LTG, Education Type HEP;precaution per surgeon  -KH      Patient Education PT LTG, Education Understanding demonstrate adequately  -KH        User Key  (r) =  Recorded By, (t) = Taken By, (c) = Cosigned By    Initials Name Provider Type     Sarah Gurrola, PT Physical Therapist          Physical Therapy Education     Title: PT OT SLP Therapies (Active)     Topic: Physical Therapy (Done)     Point: Mobility training (Done)    Learning Progress Summary    Learner Readiness Method Response Comment Documented by Status   Patient Acceptance E,TB,D DU,NR   09/23/17 1344 Done    Acceptance E,D VU,[facility] 09/21/17 1345 Done               Point: Home exercise program (Done)    Learning Progress Summary    Learner Readiness Method Response Comment Documented by Status   Patient Acceptance E,TB,D DU,NR   09/23/17 1344 Done    Acceptance E,D VU,[facility] 09/21/17 1345 Done               Point: Body mechanics (Done)    Learning Progress Summary    Learner Readiness Method Response Comment Documented by Status   Patient Acceptance E,TB,D DU,[facility] 09/23/17 1344 Done    Acceptance E,D VU,[facility] 09/21/17 1345 Done               Point: Precautions (Done)    Learning Progress Summary    Learner Readiness Method Response Comment Documented by Status   Patient Acceptance E,TB,D DU,NR   09/23/17 1344 Done    Acceptance E,D VU,[facility] 09/21/17 1345 Done                      User Key     Initials Effective Dates Name Provider Type Discipline     05/18/15 -  Sarah Gurrola, PT Physical Therapist PT     10/06/15 -  Madeline Coelho, PT Physical Therapist PT                    PT Recommendation and Plan  Anticipated Discharge Disposition: skilled nursing facility  Planned Therapy Interventions: balance training, bed mobility training, gait training, home exercise program, patient/family education, ROM (Range of Motion), strengthening, transfer training  PT Frequency: daily  Plan of Care Review  Plan Of Care Reviewed With: patient  Progress: progress toward functional goals as expected  Outcome Summary/Follow up Plan: Pt without significant c/o L hip pain during PT except when  atetmpting to sidestep as pt is unable to adequately unweigh her R LE to sidestep right. Participated well in L LE therex ; Mod/Max A x 2 for functional mobiilty. Plan is to returnt o SNF rehab.          Outcome Measures       09/23/17 1300 09/21/17 1348       How much help from another person do you currently need...    Turning from your back to your side while in flat bed without using bedrails? 2  -DM 2  -KH     Moving from lying on back to sitting on the side of a flat bed without bedrails? 2  -DM 2  -KH     Moving to and from a bed to a chair (including a wheelchair)? 1  -DM 1  -KH     Standing up from a chair using your arms (e.g., wheelchair, bedside chair)? 2  -DM 2  -KH     Climbing 3-5 steps with a railing? 1  -DM 1  -KH     To walk in hospital room? 1  -DM 1  -KH     AM-PAC 6 Clicks Score 9  -DM 9  -KH     Functional Assessment    Outcome Measure Options AM-PAC 6 Clicks Basic Mobility (PT)  -DM AM-PAC 6 Clicks Basic Mobility (PT)  -KH       User Key  (r) = Recorded By, (t) = Taken By, (c) = Cosigned By    Initials Name Provider Type    THO Gurrola, PT Physical Therapist    DM Madeline Coelho PT Physical Therapist           Time Calculation:         PT Charges       09/23/17 1343          Time Calculation    Start Time 1330  -DM      Stop Time 1343  -DM      Time Calculation (min) 13 min  -DM      PT Received On 09/23/17  -DM      PT - Next Appointment 09/24/17  -DM        User Key  (r) = Recorded By, (t) = Taken By, (c) = Cosigned By    Initials Name Provider Type    FELIX Coelho, GILBERT Physical Therapist          Therapy Charges for Today     Code Description Service Date Service Provider Modifiers Qty    83149392614 HC PT THER PROC EA 15 MIN 9/23/2017 Madeline Coelho, PT GP 1    69516665703 HC PT THER SUPP EA 15 MIN 9/23/2017 Madeline Coelho, PT GP 1          PT G-Codes  Outcome Measure Options: AM-PAC 6 Clicks Basic Mobility (PT)    Madeline Coelho PT  9/23/2017

## 2017-09-24 PROBLEM — T81.49XA SURGICAL WOUND INFECTION: Status: RESOLVED | Noted: 2017-09-19 | Resolved: 2017-09-24

## 2017-09-24 PROBLEM — I47.1 SVT (SUPRAVENTRICULAR TACHYCARDIA) (HCC): Status: RESOLVED | Noted: 2017-09-19 | Resolved: 2017-09-24

## 2017-09-24 PROBLEM — I47.10 SVT (SUPRAVENTRICULAR TACHYCARDIA): Status: RESOLVED | Noted: 2017-09-19 | Resolved: 2017-09-24

## 2017-09-24 LAB
ANION GAP SERPL CALCULATED.3IONS-SCNC: 9 MMOL/L
BACTERIA SPEC AEROBE CULT: ABNORMAL
BACTERIA SPEC AEROBE CULT: ABNORMAL
BACTERIA SPEC AEROBE CULT: NORMAL
BACTERIA SPEC AEROBE CULT: NORMAL
BUN BLD-MCNC: 12 MG/DL (ref 8–23)
BUN/CREAT SERPL: 10.7 (ref 7–25)
CALCIUM SPEC-SCNC: 9.3 MG/DL (ref 8.6–10.5)
CHLORIDE SERPL-SCNC: 103 MMOL/L (ref 98–107)
CO2 SERPL-SCNC: 30 MMOL/L (ref 22–29)
CREAT BLD-MCNC: 1.12 MG/DL (ref 0.57–1)
GFR SERPL CREATININE-BSD FRML MDRD: 47 ML/MIN/1.73
GLUCOSE BLD-MCNC: 106 MG/DL (ref 65–99)
GLUCOSE BLDC GLUCOMTR-MCNC: 106 MG/DL (ref 70–130)
GLUCOSE BLDC GLUCOMTR-MCNC: 147 MG/DL (ref 70–130)
GLUCOSE BLDC GLUCOMTR-MCNC: 163 MG/DL (ref 70–130)
GLUCOSE BLDC GLUCOMTR-MCNC: 189 MG/DL (ref 70–130)
GRAM STN SPEC: ABNORMAL
INR PPP: 2.57 (ref 0.9–1.1)
POTASSIUM BLD-SCNC: 3.8 MMOL/L (ref 3.5–5.2)
PROTHROMBIN TIME: 26.8 SECONDS (ref 11.7–14.2)
SODIUM BLD-SCNC: 142 MMOL/L (ref 136–145)
VANCOMYCIN TROUGH SERPL-MCNC: 23.7 MCG/ML (ref 5–20)

## 2017-09-24 PROCEDURE — 25010000002 PIPERACILLIN SOD-TAZOBACTAM PER 1 G: Performed by: ORTHOPAEDIC SURGERY

## 2017-09-24 PROCEDURE — 25010000002 VANCOMYCIN 10 G RECONSTITUTED SOLUTION: Performed by: ORTHOPAEDIC SURGERY

## 2017-09-24 PROCEDURE — 85610 PROTHROMBIN TIME: CPT | Performed by: HOSPITALIST

## 2017-09-24 PROCEDURE — 80048 BASIC METABOLIC PNL TOTAL CA: CPT | Performed by: NURSE PRACTITIONER

## 2017-09-24 PROCEDURE — 63710000001 INSULIN ASPART PER 5 UNITS: Performed by: INTERNAL MEDICINE

## 2017-09-24 PROCEDURE — 80202 ASSAY OF VANCOMYCIN: CPT | Performed by: ORTHOPAEDIC SURGERY

## 2017-09-24 PROCEDURE — 97110 THERAPEUTIC EXERCISES: CPT

## 2017-09-24 PROCEDURE — 82962 GLUCOSE BLOOD TEST: CPT

## 2017-09-24 RX ORDER — AMOXICILLIN AND CLAVULANATE POTASSIUM 875; 125 MG/1; MG/1
1 TABLET, FILM COATED ORAL 2 TIMES DAILY
Qty: 20 TABLET | Refills: 0 | Status: SHIPPED | OUTPATIENT
Start: 2017-09-24 | End: 2017-10-04

## 2017-09-24 RX ORDER — SACCHAROMYCES BOULARDII 250 MG
250 CAPSULE ORAL 2 TIMES DAILY
Qty: 60 CAPSULE | Refills: 0 | Status: SHIPPED | OUTPATIENT
Start: 2017-09-24 | End: 2017-10-24

## 2017-09-24 RX ORDER — LEVOFLOXACIN 750 MG/1
750 TABLET ORAL DAILY
Qty: 10 TABLET | Refills: 0 | Status: SHIPPED | OUTPATIENT
Start: 2017-09-24 | End: 2017-10-04

## 2017-09-24 RX ORDER — AMLODIPINE BESYLATE 5 MG/1
5 TABLET ORAL
Qty: 30 TABLET | Refills: 0 | Status: SHIPPED | OUTPATIENT
Start: 2017-09-25 | End: 2017-09-25

## 2017-09-24 RX ORDER — HYDROCODONE BITARTRATE AND ACETAMINOPHEN 5; 325 MG/1; MG/1
1 TABLET ORAL EVERY 6 HOURS PRN
Qty: 20 TABLET | Refills: 0 | Status: SHIPPED | OUTPATIENT
Start: 2017-09-24 | End: 2017-10-01

## 2017-09-24 RX ADMIN — HYDROCODONE BITARTRATE AND ACETAMINOPHEN 1 TABLET: 5; 325 TABLET ORAL at 01:38

## 2017-09-24 RX ADMIN — TAZOBACTAM SODIUM AND PIPERACILLIN SODIUM 3.38 G: 375; 3 INJECTION, SOLUTION INTRAVENOUS at 03:25

## 2017-09-24 RX ADMIN — INSULIN ASPART 2 UNITS: 100 INJECTION, SOLUTION INTRAVENOUS; SUBCUTANEOUS at 12:18

## 2017-09-24 RX ADMIN — TAZOBACTAM SODIUM AND PIPERACILLIN SODIUM 3.38 G: 375; 3 INJECTION, SOLUTION INTRAVENOUS at 12:18

## 2017-09-24 RX ADMIN — METOPROLOL TARTRATE 25 MG: 25 TABLET ORAL at 20:24

## 2017-09-24 RX ADMIN — ATORVASTATIN CALCIUM 80 MG: 80 TABLET, FILM COATED ORAL at 20:24

## 2017-09-24 RX ADMIN — METOPROLOL TARTRATE 25 MG: 25 TABLET ORAL at 10:00

## 2017-09-24 RX ADMIN — INSULIN ASPART 2 UNITS: 100 INJECTION, SOLUTION INTRAVENOUS; SUBCUTANEOUS at 18:15

## 2017-09-24 RX ADMIN — AMLODIPINE BESYLATE 5 MG: 5 TABLET ORAL at 10:05

## 2017-09-24 RX ADMIN — SENNOSIDES AND DOCUSATE SODIUM 2 TABLET: 8.6; 5 TABLET ORAL at 20:24

## 2017-09-24 RX ADMIN — WARFARIN SODIUM 5 MG: 5 TABLET ORAL at 18:15

## 2017-09-24 RX ADMIN — SERTRALINE 100 MG: 100 TABLET, FILM COATED ORAL at 10:04

## 2017-09-24 NOTE — PROGRESS NOTES
"Pharmacokinetic Consult - Vancomycin Dosing (Follow-up Note)    Jaci Cervantes is on day 4 pharmacy to dose vancomycin for infected left hip surgical wound site per Dr. Marcello Reardon' request. Goal trough: 15-20 mg/L.    Other Antimicrobials: Zosyn 3.375 g IV q8h extended infusion    Relevant clinical data and objective history reviewed:  77 y.o. female 68\" (172.7 cm) 257 lb 3.2 oz (117 kg)  Approximately 4 weeks ago, the patient had an intramedullary nailing of an intertrochanteric left hip fracture. She developed persistent wound drainage consistent with possible infected hematoma/abscess. She is now s/p irrigation and debridement of left hip surgical wound site on 9/20. Per most recent Ortho note, no drainage from the area at this time. Patient to be discharged today.    Creatinine   Date Value Ref Range Status   09/24/2017 1.12 (H) 0.57 - 1.00 mg/dL Final   09/22/2017 1.24 (H) 0.57 - 1.00 mg/dL Final     BUN   Date Value Ref Range Status   09/24/2017 12 8 - 23 mg/dL Final     Estimated Creatinine Clearance: 54.7 mL/min (by C-G formula based on Cr of 1.12).    Lab Results   Component Value Date    WBC 8.02 09/22/2017     Temp Readings from Last 3 Encounters:   09/24/17 98.7 °F (37.1 °C) (Oral)     Baseline culture/source/susceptibility:   9/19: Blood cultures x 2-NGTD  9/20: Wound culture-scant growth (1+) Enterococcus faecalis (susceptible to vanc)  9/20: Tissue/Bone cultures x 2-moderate growth (3+) Enterococcus faecalis (susceptible to vanc)  9/20: Anaerobic cultures x 3-NGTD    Vancomycin Dosing History  9/21 @ 0112: Vancomycin 1250 mg IV once  9/22-9/23: Vancomycin 1500 mg IV q24h    Lab Results   Component Value Date    VANCOTROUGH 23.70 (H) 09/24/2017     Assessment/Plan    1. Vancomycin was dosed at 1500 mg (~13 mg/kg)  IV q24h. Trough was slightly elevated at 23.7 (~10.5-hr level)-true trough would be closer to 22.5-23. Will hold dose today.    2. Will decrease dose to 1250 mg (~11 mg/kg) IV q24h based on " past level, and start this dose tomorrow morning at 0600 if patient is still in the hospital.     3. Will monitor serum creatinine every 24 hours since patient is on both vancomycin and Zosyn. SCr was increased today at 1.12.    4. Pharmacy will continue to follow daily while on vancomycin and adjust as needed.     Sophie Cooper, Pharm.D., BCPS

## 2017-09-24 NOTE — THERAPY TREATMENT NOTE
Acute Care - Physical Therapy Treatment Note  [facility]     Patient Name: Jaci Cervantes  : 1940  MRN: [id number]  Today's Date: 2017             Admit Date: 2017    Visit Dx:    ICD-10-CM ICD-9-CM   1. Surgical wound infection, initial encounter T81.4XXA 998.59   2. Fecal impaction K56.41 560.32   3. Difficulty walking R26.2 719.7     Patient Active Problem List   Diagnosis   • COPD (chronic obstructive pulmonary disease)   • Influenza A   • Essential hypertension   • Type 2 diabetes mellitus with complication   • GERD (gastroesophageal reflux disease)   • Anxiety   • Closed fracture of neck of left femur   • MAURICE (acute kidney injury)   • Acute post-hemorrhagic anemia   • Acute embolic stroke   • Right lower lobe pneumonia   • Surgical wound infection   • History of stroke   • SVT (supraventricular tachycardia)   • Chronic kidney disease, stage 3               Adult Rehabilitation Note       17 1357 17 1344       Rehab Assessment/Intervention    Discipline physical therapist  -DM physical therapist  -DM     Document Type therapy note (daily note)  -DM therapy note (daily note)  -DM     Subjective Information no complaints;agree to therapy  -DM no complaints;agree to therapy  -DM     Patient Effort, Rehab Treatment good  -DM good  -DM     Symptoms Noted During/After Treatment increased pain  -DM none  -DM     Symptoms Noted Comment when attempting dteps (l hip)  -DM      Precautions/Limitations fall precautions;other (see comments)   WBAT L LE  -DM fall precautions;other (see comments)   per chart pt is WBAT, pt is 50% WB  -DM     Specific Treatment Considerations still unable to unweigh R LE to sidestep due to L hip Pain  -DM      Patient Response to Treatment good  -DM good  -DM     Recorded by [DM] Madeline Coelho, PT [DM] Madeline Coelho, PT     Vital Signs    O2 Delivery Pre Treatment room air  -DM      Recorded by [DM] Madeline Coelho, PT      Pain Assessment    Pain Assessment  0-10  -DM 0-10  -DM     Pain Score 0  -DM 2  -DM     Post Pain Score 0  -DM 2  -DM     Pain Type  Acute pain  -DM     Pain Location  Hip  -DM     Pain Orientation  Left  -DM     Effect of Pain on Daily Activities pain only during L WB  -DM      Pain Intervention(s)  Repositioned;Ambulation/increased activity;Rest  -DM     Response to Interventions  tolerated  -DM     Recorded by [DM] Madeline Coelho, PT [DM] Madeline Coelho, PT     Vision Assessment/Intervention    Visual Impairment WFL  -DM WFL  -DM     Recorded by [DM] Madeline Coelho, PT [DM] Madeline Coelho, PT     Cognitive Assessment/Intervention    Current Cognitive/Communication Assessment functional  -DM functional  -DM     Orientation Status oriented x 4  -DM oriented to;person;place;situation  -DM     Follows Commands/Answers Questions 100% of the time  -% of the time  -DM     Personal Safety WNL/WFL  -DM WNL/WFL  -DM     Personal Safety Interventions fall prevention program maintained;gait belt;muscle strengthening facilitated;nonskid shoes/slippers when out of bed;supervised activity  -DM fall prevention program maintained;gait belt;muscle strengthening facilitated;nonskid shoes/slippers when out of bed;supervised activity  -DM     Recorded by [DM] Madeline Coelho, PT [DM] Madeline Coelho, PT     Bed Mobility, Assessment/Treatment    Bed Mobility, Assistive Device bed rails  -DM bed rails;draw sheet  -DM     Bed Mobility, Roll Left, [place] minimum assist (75% patient effort);verbal cues required  -DM      Bed Mobility, Roll Right, [place] minimum assist (75% patient effort);verbal cues required  -DM minimum assist (75% patient effort);verbal cues required;other (see comments)   pillow between knees  -DM     Bed Mobility, Scoot/Bridge, [place] dependent (less than 25% patient effort)  -DM      Bed Mob, Sidelying to Sit, [place] moderate assist (50% patient effort);verbal cues required  -DM moderate assist (50% patient effort);2 person  assist required;verbal cues required  -DM     Bed Mob, Sit to Sidelying, [place] 2 person assist required;maximum assist (25% patient effort);verbal cues required  -DM 2 person assist required;maximum assist (25% patient effort);verbal cues required  -DM     Bed Mobility, Safety Issues  decreased use of legs for bridging/pushing  -DM     Bed Mobility, Impairments strength decreased;impaired balance  -DM strength decreased;pain  -DM     Recorded by [DM] Madeline Coelho, PT [DM] Madeline Coelho PT     Transfer Assessment/Treatment    Transfers, Sit-Stand [place] 2 person assist required;moderate assist (50% patient effort);verbal cues required;nonverbal cues required (demo/gesture)  -DM moderate assist (50% patient effort);2 person assist required;verbal cues required  -DM     Transfers, Stand-Sit [place] 2 person assist required;moderate assist (50% patient effort);verbal cues required  -DM moderate assist (50% patient effort);2 person assist required;verbal cues required  -DM     Transfers, Sit-Stand-Sit, Assist Device rolling walker  -DM rolling walker;elevated surface  -DM     Transfer, Safety Issues  balance decreased during turns;step length decreased  -DM     Transfer, Impairments strength decreased;impaired balance;pain  -DM strength decreased;impaired balance  -DM     Transfer, Comment pt has some measure of fear that is limiting mobility  -DM 2 standing trials  -DM     Recorded by [DM] Madeline Coelho, PT [DM] Madeline Coelho PT     Gait Assessment/Treatment    Gait, [place] Level not appropriate to assess  -DM      Gait, Comment still unable to unweigh R LE to sidestep to R due to increased L hip pain  -DM pt was unable to unweigh R LE to attempt sidestep w/RW  -DM     Recorded by [DM] Madeline Coelho, PT [DM] Madeline Coelho PT     Motor Skills/Interventions    Additional Documentation Balance Skills Training (Group)  -DM      Recorded by [DM] Madeline Coelho PT      Balance Skills Training     Sitting-Level of Assistance Close supervision  -DM      Sitting-Balance Support Right upper extremity supported;Left upper extremity supported  -DM      Sitting-Balance Activities --   LE therex  -DM      Sitting # of Minutes 2  -DM      Standing-Level of Assistance Minimum assistance;x2  -DM      Static Standing Balance Support assistive device  -DM      Recorded by [DM] Madeline Coelho, PT      Therapy Exercises    Bilateral Lower Extremities AROM:;10 reps;sitting;ankle pumps/circles;LAQ  -DM      Exercise Protocols  hip ORIF  -DM     Hip ORIF Exercises  10 reps;left:;completed protocol;with assist  -DM     Recorded by [DM] Madeline Coelho, PT [DM] Madeline Coelho, PT     Positioning and Restraints    Pre-Treatment Position in bed  -DM in bed  -DM     Post Treatment Position bed  -DM bed  -DM     In Bed notified nsg;side lying left;call light within reach;encouraged to call for assist;exit alarm on;pillow between legs  -DM notified nsg;fowlers;call light within reach;encouraged to call for assist;exit alarm on;LLE elevated  -DM     Recorded by [DM] Madeline Coelho, PT [DM] Madeline Coelho, PT       User Key  (r) = Recorded By, (t) = Taken By, (c) = Cosigned By    Initials Name Effective Dates    DM Madeline Coelho, PT 10/06/15 -                 IP PT Goals       09/21/17 1346          Bed Mobility PT LTG    Bed Mobility PT LTG, Date Established 09/21/17  -      Bed Mobility PT LTG, Time to Achieve 1 wk  -KH      Bed Mobility PT LTG, Activity Type all bed mobility  -KH      Bed Mobility PT LTG, [place] Level moderate assist (50% patient effort);2 person assist required  -KH      Transfer Training PT LTG    Transfer Training PT LTG, Date Established 09/21/17  -KH      Transfer Training PT LTG, Time to Achieve 1 wk  -KH      Transfer Training PT LTG, Activity Type all transfers;bed to chair /chair to bed  -KH      Transfer Training PT LTG, [place] Level moderate assist (50% patient effort);2 person assist required   -      Gait Training PT LTG    Gait Training Goal PT LTG, Date Established 09/21/17  -      Gait Training Goal PT LTG, Time to Achieve 1 wk  -      Gait Training Goal PT LTG, [place] Level moderate assist (50% patient effort);2 person assist required  -      Gait Training Goal PT LTG, Assist Device walker, rolling  -      Gait Training Goal PT LTG, Distance to Achieve 10 ft  -      Patient Education PT LTG    Patient Education PT LTG, Date Established 09/21/17  -      Patient Education PT LTG, Time to Achieve 1 wk  -      Patient Education PT LTG, Education Type HEP;precaution per surgeon  -      Patient Education PT LTG, Education Understanding demonstrate adequately  -        User Key  (r) = Recorded By, (t) = Taken By, (c) = Cosigned By    Initials Name Provider Type    THO Gurrola, PT Physical Therapist          Physical Therapy Education     Title: PT OT SLP Therapies (Active)     Topic: Physical Therapy (Done)     Point: Mobility training (Done)    Learning Progress Summary    Learner Readiness Method Response Comment Documented by Status   Patient Acceptance E,TB,D DU,NR   09/24/17 1404 Done    Acceptance E,TB,D DU,NR   09/23/17 1344 Done    Acceptance E,D VU,NR   09/21/17 1345 Done               Point: Home exercise program (Done)    Learning Progress Summary    Learner Readiness Method Response Comment Documented by Status   Patient Acceptance E,TB,D DU,NR   09/24/17 1404 Done    Acceptance E,TB,D DU,NR   09/23/17 1344 Done    Acceptance E,D VU,NR   09/21/17 1345 Done               Point: Body mechanics (Done)    Learning Progress Summary    Learner Readiness Method Response Comment Documented by Status   Patient Acceptance E,TB,D DU,NR   09/24/17 1404 Done    Acceptance E,TB,D DU,NR   09/23/17 1344 Done    Acceptance E,D VU,NR   09/21/17 1345 Done               Point: Precautions (Done)    Learning Progress Summary    Learner Readiness Method  Response Comment Documented by Status   Patient Acceptance E,TB,D DU,NR  DM 09/24/17 1404 Done    Acceptance E,TB,D DU,NR  DM 09/23/17 1344 Done    Acceptance E,D MELANY,NR   09/21/17 1345 Done                      User Key     Initials Effective Dates Name Provider Type Discipline     05/18/15 -  Sarah Gurrola, PT Physical Therapist PT     10/06/15 -  Madeline Coelho, PT Physical Therapist PT                    PT Recommendation and Plan  Anticipated Discharge Disposition: skilled nursing facility  Planned Therapy Interventions: balance training, bed mobility training, gait training, home exercise program, patient/family education, ROM (Range of Motion), strengthening, transfer training  PT Frequency: daily  Plan of Care Review  Plan Of Care Reviewed With: patient  Progress: progress toward functional goals as expected  Outcome Summary/Follow up Plan: Pt without c/o L hip pain today during PT EXCEPT in weightbearing; still unable to take a step forward or sideways due to L hip pain - beleive there is also a fear factor involved. Pt continues to particpate well and will need SNF rehab upon hospital discharge.          Outcome Measures       09/24/17 1400 09/23/17 1300       How much help from another person do you currently need...    Turning from your back to your side while in flat bed without using bedrails? 3  -DM 2  -DM     Moving from lying on back to sitting on the side of a flat bed without bedrails? 2  -DM 2  -DM     Moving to and from a bed to a chair (including a wheelchair)? 2  -DM 1  -DM     Standing up from a chair using your arms (e.g., wheelchair, bedside chair)? 2  -DM 2  -DM     Climbing 3-5 steps with a railing? 1  -DM 1  -DM     To walk in hospital room? 1  -DM 1  -DM     AM-PAC 6 Clicks Score 11  -DM 9  -DM     Functional Assessment    Outcome Measure Options AM-PAC 6 Clicks Basic Mobility (PT)  -DM AM-PAC 6 Clicks Basic Mobility (PT)  -DM       User Key  (r) = Recorded By, (t) = Taken  By, (c) = Cosigned By    Initials Name Provider Type    FELIX Coelho PT Physical Therapist           Time Calculation:         PT Charges       09/24/17 1404          Time Calculation    Start Time 1340  -DM      Stop Time 1357  -DM      Time Calculation (min) 17 min  -DM      PT Received On 09/24/17  -DM      PT - Next Appointment 09/25/17  -DM        User Key  (r) = Recorded By, (t) = Taken By, (c) = Cosigned By    Initials Name Provider Type    FELIX Coelho PT Physical Therapist          Therapy Charges for Today     Code Description Service Date Service Provider Modifiers Qty    42393228710 HC PT THER PROC EA 15 MIN 9/23/2017 Madeline Coelho, PT GP 1    86211226616 HC PT THER SUPP EA 15 MIN 9/23/2017 Madeline Coelho, PT GP 1    99663810921 HC PT THER SUPP EA 15 MIN 9/24/2017 Madeline Coelho, PT GP 1    73588674266 HC PT THER PROC EA 15 MIN 9/24/2017 Madeline Coelho, PT GP 1          PT G-Codes  Outcome Measure Options: AM-PAC 6 Clicks Basic Mobility (PT)    Madeline Coelho PT  9/24/2017

## 2017-09-24 NOTE — PROGRESS NOTES
"  Infectious Diseases Progress Note    Nigel Veronica MD     [facility]  [place]: 5 days  Patient Identification:  Name: Jaci Cervantes  Age: 77 y.o.  Sex: female  :  1940  MRN: [id number]         Primary Care Physician: Stephan Felder MD            Subjective: Feeling better denies any specific complaints of pain in the left hip.  Denies any fever and chills.  Interval History: Underwent I&D of hematoma last night with placement of drains, and subsequent introduction of vancomycin and Zosyn.     Objective:    Scheduled Meds:    amLODIPine 5 mg Oral Q24H   atorvastatin 80 mg Oral Nightly   insulin aspart 0-9 Units Subcutaneous 4x Daily With Meals & Nightly   metoprolol tartrate 25 mg Oral Q12H   piperacillin-tazobactam 3.375 g Intravenous Q8H   sennosides-docusate sodium 2 tablet Oral Nightly   sertraline 100 mg Oral Daily   vancomycin 1,500 mg Intravenous Q24H   warfarin 5 mg Oral Daily     Continuous Infusions:    Pharmacy to dose vancomycin        Vital signs in last 24 hours:  Temp:  [98.2 °F (36.8 °C)-98.7 °F (37.1 °C)] 98.7 °F (37.1 °C)  Heart Rate:  [79-88] 88  Resp:  [16-18] 18  BP: (151-167)/(69-86) 158/77    Intake/Output:    Intake/Output Summary (Last 24 hours) at 17 1322  Last data filed at 17 1218   Gross per 24 hour   Intake             1380 ml   Output                0 ml   Net             1380 ml       Exam:  /77 (BP Location: Left arm, Patient Position: Lying)  Pulse 88  Temp 98.7 °F (37.1 °C) (Oral)   Resp 18  Ht 68\" (172.7 cm)  Wt 257 lb 3.2 oz (117 kg)  SpO2 98%  BMI 39.11 kg/m2    General Appearance:    Alert, cooperative, no distress, AAOx3, And smiling.                          Head:    Normocephalic, without obvious abnormality, atraumatic                           Eyes:    PERRL, conjunctiva/corneas clear, EOM's intact, both eyes                         Throat:   Lips, tongue, gums normal; oral mucosa pink and moist                          "  Neck:   Supple, symmetrical, trachea midline, no JVD                         Lungs:    Clear to auscultation bilaterally, respirations unlabored                 Chest Wall:    No tenderness or deformity                          Heart:    Irregularly irregular rate and rhythm, S1 and S2 normal, no murmur,no  Rub                                      or gallop                  Abdomen:     Soft, non-tender, bowel sounds active, no masses, ecchymosis at the left lower quadrant area noted                 Extremities:   Surgical site is dressed with no obvious drainage understanding of the dressing.  No obvious cellulitis or redness noted.                       Pulses:   Pulses palpable in all extremities                                            Data Review:    I reviewed the patient's new clinical results.    Results from last 7 days  Lab Units 09/22/17  0557 09/21/17  0357 09/20/17  0403 09/19/17  1504   WBC 10*3/mm3 8.02 9.52 10.39 10.94*   HEMOGLOBIN g/dL 8.3* 8.1* 10.0* 10.0*   PLATELETS 10*3/mm3 138* 154 186 218       Results from last 7 days  Lab Units 09/24/17  0543 09/22/17  0557 09/21/17  0357 09/20/17  1421 09/20/17  0403 09/19/17  1504   SODIUM mmol/L 142 140 141  --  139 137   POTASSIUM mmol/L 3.8 3.9 3.7 3.9 3.2* 3.5   CHLORIDE mmol/L 103 102 102  --  95* 93*   CO2 mmol/L 30.0* 30.8* 30.7*  --  29.7* 31.6*   BUN mg/dL 12 16 18  --  21 21   CREATININE mg/dL 1.12* 1.24* 1.26*  --  1.43* 1.62*   CALCIUM mg/dL 9.3 8.8 8.7  --  9.2 9.5   GLUCOSE mg/dL 106* 117* 94  --  116* 163*       Microbiology Results (last 10 days)     Procedure Component Value - Date/Time    Anaerobic Culture [[id number]]  (Normal) Collected:  09/20/17 1850    Lab Status:  Preliminary result Specimen:  Tissue from Hip, Left Updated:  09/23/17 1210     Culture No anaerobes isolated at 3 days    Tissue/Bone Culture [[id number]]  (Abnormal)  (Susceptibility) Collected:  09/20/17 1850    Lab Status:  Final result Specimen:  Tissue from Hip,  Left Updated:  09/23/17 0933     Tissue Culture --      Light growth (2+) Enterococcus faecalis (A)     Gram Stain Result Many (4+) Red blood cells      Many (4+) WBCs seen      No organisms seen    Susceptibility      Enterococcus faecalis     MIGUEL     Ampicillin <=2 ug/ml Susceptible     Gentamicin High Level Synergy SYN-S  Susceptible     Vancomycin 1 ug/ml Susceptible                    Anaerobic Culture [[id number]]  (Normal) Collected:  09/20/17 1848    Lab Status:  Preliminary result Specimen:  Tissue from Hip, Left Updated:  09/23/17 1211     Culture No anaerobes isolated at 3 days    Tissue/Bone Culture [[id number]]  (Abnormal) Collected:  09/20/17 1848    Lab Status:  Final result Specimen:  Tissue from Hip, Left Updated:  09/24/17 0824     Tissue Culture --      Moderate growth (3+) Enterococcus faecalis (A)      Refer to previous tissue cultures done 9/20 for sensitivities        Gram Stain Result Many (4+) Red blood cells      Many (4+) WBCs seen      No organisms seen    Anaerobic Culture [[id number]]  (Normal) Collected:  09/20/17 1847    Lab Status:  Preliminary result Specimen:  Wound from Hip, Left Updated:  09/23/17 1210     Culture No anaerobes isolated at 3 days    Wound Culture [[id number]]  (Abnormal)  (Susceptibility) Collected:  09/20/17 1847    Lab Status:  Final result Specimen:  Wound from Hip, Left Updated:  09/23/17 0923     Wound Culture --      Scant growth (1+) Enterococcus faecalis (A)      Rare Pseudomonas aeruginosa (A)     Gram Stain Result Moderate (3+) WBCs seen      No organisms seen    Susceptibility      Enterococcus faecalis     MIGUEL     Ampicillin <=2 ug/ml Susceptible     Gentamicin High Level Synergy SYN-S  Susceptible     Vancomycin 1 ug/ml Susceptible                Susceptibility      Pseudomonas aeruginosa     MIGUEL     Cefepime <=1 ug/ml Susceptible     Ceftazidime <=1 ug/ml Susceptible     Ciprofloxacin <=0.25 ug/ml Susceptible     Levofloxacin 0.5 ug/ml Susceptible      Meropenem <=0.25 ug/ml Susceptible     Piperacillin + Tazobactam <=4 ug/ml Susceptible     Tobramycin <=1 ug/ml Susceptible                    Blood Culture [[id number]]  (Normal) Collected:  09/19/17 1824    Lab Status:  Preliminary result Specimen:  Blood from Arm, Left Updated:  09/23/17 2201     Blood Culture No growth at 4 days    Blood Culture [[id number]]  (Normal) Collected:  09/19/17 1758    Lab Status:  Preliminary result Specimen:  Blood from Arm, Right Updated:  09/23/17 1901     Blood Culture No growth at 4 days          Assessment:  Principal Problem:    Surgical wound infection  Active Problems:    COPD (chronic obstructive pulmonary disease)    Essential hypertension    Type 2 diabetes mellitus with complication    History of stroke    SVT (supraventricular tachycardia)    Chronic kidney disease, stage 3      Plan/Discussion:See below  See my discussion on 9/22/2017.  Based on culture data her antibiotic regimen can be switched to much simpler program at the time of discharge consisting of Levaquin and Augmentin to address enterococcus and Pseudomonas for total of 14 days from final I&D.  If orthopedic surgery service, based on the intraoperative assessment, think that there is a concern for deeper structure involvement then above regimen can be extended to 6 weeks.  Obviously in that scenario she needs to be monitored very closely with weekly labs consisting of CBC CMP ESR and CRP and her INR needs to be monitored very closely because of the interaction as mentioned above.  Nigel Veronica MD  9/24/2017  1:22 PM    Much of this encounter note is an electronic transcription/translation of spoken language to printed text. The electronic translation of spoken language may permit erroneous, or at times, nonsensical words or phrases to be inadvertently transcribed; Although I have reviewed the note for such errors, some may still exist

## 2017-09-24 NOTE — PLAN OF CARE
Problem: Patient Care Overview (Adult)  Goal: Plan of Care Review  Outcome: Ongoing (interventions implemented as appropriate)    09/24/17 1402   Coping/Psychosocial Response Interventions   Plan Of Care Reviewed With patient   Patient Care Overview   Progress progress toward functional goals as expected   Outcome Evaluation   Outcome Summary/Follow up Plan Pt without c/o L hip pain today during PT EXCEPT in weightbearing; still unable to take a step forward or sideways due to L hip pain - beleive there is also a fear factor involved. Pt continues to particpate well and will need SNF rehab upon hospital discharge.

## 2017-09-24 NOTE — PROGRESS NOTES
"Orthopaedic Surgery  Daily Progress Note  Rounds with Dr Sanchez    /77 (BP Location: Left arm, Patient Position: Lying)  Pulse 80  Temp 98.7 °F (37.1 °C) (Oral)   Resp 18  Ht 68\" (172.7 cm)  Wt 257 lb 3.2 oz (117 kg)  SpO2 98%  BMI 39.11 kg/m2          SUBJECTIVE  Patient feels well, no pain in hip    PHYSICAL EXAM  Resting in NAD.   A&O x3.    Dressings c/d/i.  Incisions examined.  No drainage, no palpable hematoma.    No issues to report    Active ankle ankle DF and PF  Reports sensation intact to light touch throughout  Foot/toes warm, perfused, palpable DP  No pain with PROM of hip     Principal Problem:    Surgical wound infection  Active Problems:    COPD (chronic obstructive pulmonary disease)    Essential hypertension    Type 2 diabetes mellitus with complication    History of stroke    SVT (supraventricular tachycardia)    Chronic kidney disease, stage 3      PLAN / DISPOSITION:  78 y/o female POD 4 s/p I&D of left hip with evacuation of infected hematoma.    Cultures growing enterococcus, sensitivity pending.    1. Pain control: Orals  2.  Antibiotics: Continue empiric coverage with vanc/zosyn; follow up culture data, narrow coverage as directed by cultures/ID; appreciate Dr. Veronica's care/expertise.  3.  PT: WBAT, no restrictions  4. DVT: Coumadin; CAIT/SCDs, mobilization  5.  Will remove drains today, daily dry dressing changes prn  6. Postop anemia: Hb 8.3 yesterday, VSS; will monitor  7. Dispo: pending    Continue current    Molly Hayward, APRN  09/24/17  8:06 AM  "

## 2017-09-24 NOTE — DISCHARGE SUMMARY
NAME: Jaci Cervantes ADMIT: 2017   : 1940  PCP: Stephan Felder MD    MRN: [id number] LOS: 5 days   AGE/SEX: 77 y.o. female  ROOM: [facility]/     Date of Admission:  2017  Date of Discharge:  2017    PCP: Stephan Felder MD      CHIEF COMPLAINT  Wound Infection      DISCHARGE DIAGNOSIS  Active Hospital Problems (** Indicates Principal Problem)    Diagnosis Date Noted   • **Surgical wound infection [T81.4XXA] 2017   • Chronic kidney disease, stage 3 [N18.3] 2017   • History of stroke [Z86.73] 2017   • SVT (supraventricular tachycardia) [I47.1] 2017   • Type 2 diabetes mellitus with complication [E11.8] 2016   • COPD (chronic obstructive pulmonary disease) [J44.9] 2016   • Essential hypertension [I10] 2016      Resolved Hospital Problems    Diagnosis Date Noted Date Resolved   • Hypokalemia [E87.6] 2017       SECONDARY DIAGNOSES  Past Medical History:   Diagnosis Date   • COPD (chronic obstructive pulmonary disease)    • Diabetes mellitus    • History of transfusion    • Hyperlipidemia    • Hypertension    • Stroke 2017       CONSULTS   Treatment Team     Provider Relationship Specialty Contact    Matt Fry MD Attending --  [phone]    Nigel Veronica MD Consulting Physician Infectious Diseases  [phone]    Marcello Reardon Jr., MD Consulting Physician Orthopedic Surgery  [phone]    Erlin Martinez MD Consulting Physician Cardiology  [phone]          PROCEDURES PERFORMED  Procedure: Irrigation and debridement, left hip  Date of Service: 2017  Surgeon: Marcello Reardon Jr., MD     CT SCAN OF THE LEFT HIP AND PELVIS  17  1. There has been internal fixation of the intertrochanteric fracture of  the left proximal femur with an intramedullary vladimir and intersecting  screw and the alignment appears satisfactory. There are several small  fracture fragments near the lesser trochanter and at the  site of vladimir  insertion at the greater trochanter. There is a small amount of adjacent  hematoma and effacement of the subcutaneous soft tissues near the  operative site. There is no large discrete hematoma present. There is no  deep hematoma within the pelvis.  2. There is moderate distention of the urinary bladder extending to the  level of the sacral promontory. The urinary bladder has a smooth  contour. There is a large amount of stool within the rectal ampulla  suggesting an element of fecal impaction.  3. The remainder of the bony pelvis is unremarkable. No other fractures  are seen.        HOSPITAL COURSE  Ms. Cervantes is a 77 y.o. female who presented to [facility] initially complaining of worsening drainage from her left hip wound after ORIF of left femur fracture during previous admission.  Please see the admitting history and physical for further details.  She was found to have infected left hip hematoma and was admitted to the hospital for further evaluation and treatment.  She was seen by Dr. Reardon of orthopedic surgery and Dr. Veronica of infectious disease.  She also had SVT on admission was seen by cardiology.  She was started on metoprolol and cleared for surgery.  Dr. Reardon took her for the above-mentioned procedure on 9/20.  Cultures were obtained and Dr. Veronica has directed antibiotic care.  She is growing enterococcus and Pseudomonas and will be discharged on Levaquin and Augmentin per Dr. Veronica's recommendation.  She was started back on Coumadin and INR today is 2.57.  This needs to be monitored post discharge.  She's been cleared for discharge back to her assisted living.  Dr. Veronica has recommended weekly CBC, CMP, ESR, CRP and close INR monitoring.  INR should be checked by Wednesday.        PHYSICAL EXAM  Temp:  [98.2 °F (36.8 °C)-98.7 °F (37.1 °C)] 98.7 °F (37.1 °C)  Heart Rate:  [79-88] 88  Resp:  [16-18] 18  BP: (151-167)/(69-86) 158/77  Body mass index is 39.11  kg/(m^2).  Physical Exam   Constitutional: She is oriented to person, place, and time. No distress.   Cardiovascular: Normal rate and regular rhythm.    No murmur heard.  Pulmonary/Chest: Effort normal and breath sounds normal.   Abdominal: Soft. Bowel sounds are normal. She exhibits no distension. There is no tenderness.   Musculoskeletal: Normal range of motion. She exhibits no edema.   Left hip dressed   Neurological: She is alert and oriented to person, place, and time.   Skin: Skin is warm and dry. She is not diaphoretic.         CONDITION ON DISCHARGE  Stable.      DISCHARGE DISPOSITION   [facility]      DISCHARGE MEDICATIONS   Jaci Cervantes   Home Medication Instructions FELICIA:[id number]    Printed on:09/24/17 6871   Medication Information                      amLODIPine (NORVASC) 5 MG tablet  Take 1 tablet by mouth Daily.             amoxicillin-clavulanate (AUGMENTIN) 875-125 MG per tablet  Take 1 tablet by mouth 2 (Two) Times a Day for 10 days.             aspirin 325 MG tablet  Take 1 tablet by mouth Daily.             atorvastatin (LIPITOR) 80 MG tablet  Take 1 tablet by mouth Every Night.             HYDROcodone-acetaminophen (NORCO) 5-325 MG per tablet  Take 1 tablet by mouth Every 6 (Six) Hours As Needed for Moderate Pain  for up to 7 days.             levoFLOXacin (LEVAQUIN) 750 MG tablet  Take 1 tablet by mouth Daily for 10 days.             metoprolol tartrate (LOPRESSOR) 25 MG tablet  Take 1 tablet by mouth Every 12 (Twelve) Hours.             nystatin (MYCOSTATIN) 810067 UNIT/GM cream  Apply  topically Every 12 (Twelve) Hours.             oxybutynin XL (DITROPAN-XL) 5 MG 24 hr tablet  Take 5 mg by mouth every night at bedtime.             saccharomyces boulardii (FLORASTOR) 250 MG capsule  Take 1 capsule by mouth 2 (Two) Times a Day for 30 days.             sertraline (ZOLOFT) 100 MG tablet  Take 100 mg by mouth Daily.             warfarin (COUMADIN) 5 MG tablet  5mg po daily                Diet Instructions     Diet:       Diet Texture / Consistency:  Regular   Common Modifiers:  Consistent Carbohydrate                Activity Instructions     Activity as Tolerated                 No future appointments.  Additional Instructions for the Follow-ups that You Need to Schedule     Protime-INR    Sep 27, 2017 (Approximate)    Is the Patient on Coumadin (Warfarin) therapy?:  Yes             Follow-up Information     Follow up with [facility] .    Specialties:  Skilled Nursing Facility, Intermediate Care Facility, Personal Care Services    Contact information:    [address] [facility] [address]  [phone]        Follow up with Stephan Felder MD Follow up in 1 week(s).    Specialty:  Family Medicine    Contact information:    [address] [facility] [address]  [facility] [address]  [phone]          Follow up with Marcello Reardon Jr, MD Follow up in 2 week(s).    Specialty:  Orthopedic Surgery    Contact information:    [address] [facility] [facility] [address]  [place] [address]  [phone]            TEST  RESULTS PENDING AT DISCHARGE   Order Current Status    Vancomycin, Trough Vancomycin is scheduled at 1500. Please draw trough 30 minutes prior to hanging dose (don't draw level while medication is infusing). Collected (09/24/17 1431)    Anaerobic Culture Preliminary result    Anaerobic Culture Preliminary result    Anaerobic Culture Preliminary result    Blood Culture Preliminary result    Blood Culture Preliminary result             Matt Fry MD  [facility]  09/24/17  2:47 PM      Time: greater than 30 minutes.

## 2017-09-24 NOTE — PLAN OF CARE
Problem: Patient Care Overview (Adult)  Goal: Plan of Care Review  Outcome: Ongoing (interventions implemented as appropriate)    09/24/17 0707   Coping/Psychosocial Response Interventions   Plan Of Care Reviewed With patient   Patient Care Overview   Progress improving   Outcome Evaluation   Outcome Summary/Follow up Plan VSS. RESTED WELL OVERNIGHT BETWEEN CARE AFTER PAIN PILL. SAFETY MAINTAINED.         Problem: Fall Risk (Adult)  Goal: Absence of Falls  Outcome: Ongoing (interventions implemented as appropriate)    Problem: Pain, Acute (Adult)  Goal: Acceptable Pain Control/Comfort Level  Outcome: Ongoing (interventions implemented as appropriate)    Problem: Pressure Ulcer Risk (Fitz Scale) (Adult,Obstetrics,Pediatric)  Goal: Skin Integrity  Outcome: Ongoing (interventions implemented as appropriate)

## 2017-09-24 NOTE — PROGRESS NOTES
[facility]  Cardiology Progress Note    Patient Identification:  Name: Jaci Cervantes  Age: 77 y.o.  Sex: female  :  1940  MRN: [id number]                 Follow Up / Chief Complaint: Preop clearance, NSVCT,  h/o nonsustained SVT. (On Coumadin for CVA )    Interval History:  Asked to clear patient for surgery- history of recent ORIF with surgical wound infection     Subjective:  Denies chest pain, pressure, palpitations or dizziness.  Denies shortness of breath or cough.  Some surgical site pain but better with drains pulled.    Objective:    Tele: Sinus  With pvc's       temp:  [97.8 °F (36.6 °C)-98.5 °F (36.9 °C)] 98.3 °F (36.8 °C)  Heart Rate:  [70-87] 87  Resp:  [16-18] 16  BP: (153-171)/(75-90) 167/86    Past Medical History:  Past Medical History:   Diagnosis Date   • COPD (chronic obstructive pulmonary disease)    • Diabetes mellitus    • History of transfusion    • Hyperlipidemia    • Hypertension    • Stroke 2017     Past Surgical History:  Past Surgical History:   Procedure Laterality Date   • HYSTERECTOMY     • INCISION AND DRAINAGE HIP Left 2017    Procedure: Irrigation and debridement, left hip;  Surgeon: Marcello Reardon Jr., MD;  Location: [facility];  Service:    • DC OPEN FIX INTER/SUBTROCH FX,IMPLNT Left 2017    Procedure: FEMUR INTRAMEDULLARY NAILING/RODDING;  Surgeon: Marcello Reardon Jr., MD;  Location: [facility];  Service: Orthopedics   • TUBAL ABDOMINAL LIGATION          Social History:   Social History   Substance Use Topics   • Smoking status: Never Smoker   • Smokeless tobacco: Never Used   • Alcohol use No      Family History:  History reviewed. No pertinent family history.       Allergies:  No Known Allergies  Scheduled Meds:    amLODIPine 5 mg Q24H   atorvastatin 80 mg Nightly   insulin aspart 0-9 Units 4x Daily With Meals & Nightly   metoprolol tartrate 25 mg Q12H   piperacillin-tazobactam 3.375 g Q8H   sennosides-docusate sodium 2 tablet  Nightly   sertraline 100 mg Daily   vancomycin 1,500 mg Q24H   warfarin 5 mg Daily           INTAKE AND OUTPUT:    Intake/Output Summary (Last 24 hours) at 09/23/17 2142  Last data filed at 09/23/17 1704   Gross per 24 hour   Intake             1540 ml   Output               90 ml   Net             1450 ml       Review of Systems:   GI: No nausea or vomiting  Cardiac:  No chest pain or palpitations  Pulmonary:  No shortness of breath or cough    Constitutional:  Temp:  [97.8 °F (36.6 °C)-98.5 °F (36.9 °C)] 98.3 °F (36.8 °C)  Heart Rate:  [70-87] 87  Resp:  [16-18] 16  BP: (153-171)/(75-90) 167/86    Physical Exam  DANIEL Isaac  General:  Awake, alert resting quietly.  Appears in no acute distress  Eyes: PERTL,  HEENT:  No JVD. Thyroid not visibly enlarged. No mucosal  cyanosis  Respiratory: Respirations regular and unlabored at rest. BBS with good air entry in all fields. No crackles or wheezes auscultated  Cardiovascular: S1S2 Regular rate and rhythm. No murmur   No pretibial pitting edema  Gastrointestinal: Abdomen soft, non tender. Bowel sounds present.  Musculoskeletal: CAMARA x4 with  Limited ROM LLE.  Sanguinous drainage and surgical drains x2  No abnormal movements  Extremities: No digital clubbing or cyanosis  Skin: Color pale, pink. Skin warm and dry to touch. No rashes    Neuro: AAO x3 CN II-XII grossly intact  Psych: Mood and affect normal, pleasant and cooperative        Cardiographics  Telemetry:   [facility]'s          Echocardiogram 8-2017   · Left ventricular systolic function is normal. Calculated EF = 57.6%. Estimated EF was in agreement with the calculated EF. Estimated EF = 58%. Normal left ventricular cavity size noted. All left ventricular wall segments contract normally. Left ventricular wall thickness is consistent with mild concentric hypertrophy. Left ventricular diastolic dysfunction is noted (grade I a w/high LAP) consistent with impaired relaxation.  · Left atrial volume is  "moderately increased. Saline test results are negative.  · The aortic valve is not well visualized. The aortic valve is abnormal in structure. There is mild thickening of the aortic valve.        Lab Review         Results from last 7 days  Lab Units 09/23/17  0546   MAGNESIUM mg/dL 2.1       Results from last 7 days  Lab Units 09/22/17  0557   SODIUM mmol/L 140   POTASSIUM mmol/L 3.9   BUN mg/dL 16   CREATININE mg/dL 1.24*   CALCIUM mg/dL 8.8       Results from last 7 days  Lab Units 09/22/17  0557 09/21/17  0357 09/20/17  0403   WBC 10*3/mm3 8.02 9.52 10.39   HEMOGLOBIN g/dL 8.3* 8.1* 10.0*   HEMATOCRIT % 26.8* 26.3* 32.0*   PLATELETS 10*3/mm3 138* 154 186       Results from last 7 days  Lab Units 09/23/17  0546 09/22/17  0557 09/21/17  0357   INR  2.27* 2.21* 2.10*         Assessment:   - NSVT  - h/o non sustained SVT  - HTN  - EF 55-60%, gr 1 chavez dysfx  - h/o left ORIF  - s/p I&D left hip wound  - Anemia  - DM  - Dyslipidemia      Plan:  - NSVT -  electrolytes normal.  Mag 2.1 9/23.  TSH 5    - HTN - poorly controlled on beta blocker.  Norvasc 9/22    - EF 55-60%, gr 1 chavez dysfx     - hx of CVA: inr therapeutic        Labs/tests ordered for am BMP      I reviewed the patient's new clinical results and treatment plan   I personally viewed and interpreted the patient's EKG/Telemetry data    )9/23/2017  DANIEL Johnsonon/Transcription:   \"Dictated utilizing Dragon dictation\".     "

## 2017-09-25 VITALS
OXYGEN SATURATION: 97 % | RESPIRATION RATE: 18 BRPM | WEIGHT: 257.2 LBS | TEMPERATURE: 97.5 F | DIASTOLIC BLOOD PRESSURE: 87 MMHG | BODY MASS INDEX: 38.98 KG/M2 | HEART RATE: 86 BPM | HEIGHT: 68 IN | SYSTOLIC BLOOD PRESSURE: 190 MMHG

## 2017-09-25 LAB
BACTERIA SPEC ANAEROBE CULT: NORMAL
GLUCOSE BLDC GLUCOMTR-MCNC: 111 MG/DL (ref 70–130)
GLUCOSE BLDC GLUCOMTR-MCNC: 142 MG/DL (ref 70–130)
GLUCOSE BLDC GLUCOMTR-MCNC: 149 MG/DL (ref 70–130)
INR PPP: 2.97 (ref 0.9–1.1)
PROTHROMBIN TIME: 30 SECONDS (ref 11.7–14.2)

## 2017-09-25 PROCEDURE — 85610 PROTHROMBIN TIME: CPT | Performed by: HOSPITALIST

## 2017-09-25 PROCEDURE — 99232 SBSQ HOSP IP/OBS MODERATE 35: CPT | Performed by: INTERNAL MEDICINE

## 2017-09-25 PROCEDURE — 25010000002 HYDRALAZINE PER 20 MG: Performed by: INTERNAL MEDICINE

## 2017-09-25 PROCEDURE — 25010000002 VANCOMYCIN 10 G RECONSTITUTED SOLUTION: Performed by: ORTHOPAEDIC SURGERY

## 2017-09-25 PROCEDURE — 25010000002 PIPERACILLIN SOD-TAZOBACTAM PER 1 G: Performed by: ORTHOPAEDIC SURGERY

## 2017-09-25 PROCEDURE — 82962 GLUCOSE BLOOD TEST: CPT

## 2017-09-25 RX ORDER — METOPROLOL TARTRATE 50 MG/1
50 TABLET, FILM COATED ORAL EVERY 12 HOURS SCHEDULED
Status: DISCONTINUED | OUTPATIENT
Start: 2017-09-25 | End: 2017-09-25 | Stop reason: HOSPADM

## 2017-09-25 RX ORDER — LABETALOL HYDROCHLORIDE 5 MG/ML
10 INJECTION, SOLUTION INTRAVENOUS ONCE
Status: COMPLETED | OUTPATIENT
Start: 2017-09-25 | End: 2017-09-25

## 2017-09-25 RX ORDER — AMLODIPINE BESYLATE 5 MG/1
10 TABLET ORAL
Qty: 30 TABLET | Refills: 0
Start: 2017-09-25

## 2017-09-25 RX ORDER — HYDRALAZINE HYDROCHLORIDE 20 MG/ML
10 INJECTION INTRAMUSCULAR; INTRAVENOUS EVERY 4 HOURS PRN
Status: DISCONTINUED | OUTPATIENT
Start: 2017-09-25 | End: 2017-09-25 | Stop reason: HOSPADM

## 2017-09-25 RX ADMIN — AMLODIPINE BESYLATE 5 MG: 5 TABLET ORAL at 10:03

## 2017-09-25 RX ADMIN — HYDRALAZINE HYDROCHLORIDE 10 MG: 20 INJECTION INTRAMUSCULAR; INTRAVENOUS at 00:43

## 2017-09-25 RX ADMIN — SERTRALINE 100 MG: 100 TABLET, FILM COATED ORAL at 10:03

## 2017-09-25 RX ADMIN — TAZOBACTAM SODIUM AND PIPERACILLIN SODIUM 3.38 G: 375; 3 INJECTION, SOLUTION INTRAVENOUS at 17:20

## 2017-09-25 RX ADMIN — HYDROCODONE BITARTRATE AND ACETAMINOPHEN 1 TABLET: 5; 325 TABLET ORAL at 17:20

## 2017-09-25 RX ADMIN — TAZOBACTAM SODIUM AND PIPERACILLIN SODIUM 3.38 G: 375; 3 INJECTION, SOLUTION INTRAVENOUS at 10:09

## 2017-09-25 RX ADMIN — TAZOBACTAM SODIUM AND PIPERACILLIN SODIUM 3.38 G: 375; 3 INJECTION, SOLUTION INTRAVENOUS at 00:43

## 2017-09-25 RX ADMIN — VANCOMYCIN HYDROCHLORIDE 1250 MG: 10 INJECTION, POWDER, LYOPHILIZED, FOR SOLUTION INTRAVENOUS at 05:38

## 2017-09-25 RX ADMIN — HYDROCODONE BITARTRATE AND ACETAMINOPHEN 1 TABLET: 5; 325 TABLET ORAL at 00:17

## 2017-09-25 RX ADMIN — HYDRALAZINE HYDROCHLORIDE 10 MG: 20 INJECTION INTRAMUSCULAR; INTRAVENOUS at 05:38

## 2017-09-25 RX ADMIN — WARFARIN SODIUM 5 MG: 5 TABLET ORAL at 17:20

## 2017-09-25 RX ADMIN — METOPROLOL TARTRATE 25 MG: 25 TABLET ORAL at 10:04

## 2017-09-25 RX ADMIN — LABETALOL HYDROCHLORIDE 10 MG: 5 INJECTION, SOLUTION INTRAVENOUS at 02:28

## 2017-09-25 NOTE — PROGRESS NOTES
Continued Stay Note  [facility]     Patient Name: Jaci Cervantes  MRN: [id number]  Today's Date: 9/25/2017    Admit Date: 9/19/2017          Discharge Plan       09/25/17 0832    Case Management/Social Work Plan    Plan [facility]    Additional Comments Spoke to Thuy and rehana cert is initiated.CCP follow for DC to facility              Discharge Codes     None        Expected Discharge Date and Time     Expected Discharge Date Expected Discharge Time    Sep 24, 2017             Tonie Buckner RN

## 2017-09-25 NOTE — PLAN OF CARE
Problem: Patient Care Overview (Adult)  Goal: Plan of Care Review  Outcome: Ongoing (interventions implemented as appropriate)    09/25/17 0017 09/25/17 0512   Coping/Psychosocial Response Interventions   Plan Of Care Reviewed With patient --    Patient Care Overview   Progress --  no change   Outcome Evaluation   Outcome Summary/Follow up Plan --  Pt denies L hip pain during shift. Pt BP elevated. See additional note. Pt sleeping between care. Pt was intended to be d/c today, but to be determined related to BP elevation. VSS at this time. Continue to monitor.        Goal: Adult Individualization and Mutuality  Outcome: Ongoing (interventions implemented as appropriate)  Goal: Discharge Needs Assessment  Outcome: Ongoing (interventions implemented as appropriate)    Problem: Fall Risk (Adult)  Goal: Absence of Falls  Outcome: Ongoing (interventions implemented as appropriate)    Problem: Pain, Acute (Adult)  Goal: Acceptable Pain Control/Comfort Level  Outcome: Ongoing (interventions implemented as appropriate)    Problem: Pressure Ulcer Risk (Fitz Scale) (Adult,Obstetrics,Pediatric)  Goal: Skin Integrity  Outcome: Ongoing (interventions implemented as appropriate)

## 2017-09-25 NOTE — PROGRESS NOTES
[facility]    Physicians Statement of Medical Necessity for Ambulance Transportation    It is medically necessary for:    Patient Name: Jaci Cervantes    Insurance Information:      To be transported by ambulance:    From (if nursing facility, specify level of care: skilled, intermediate, etc): [facility]    To (specify level of care if nursing facility): [facility]    Date of Service: 9/25/2017      For dialysis patients state date dialysis began:     Diagnosis:   • **Surgical wound infection [T81.4XXA] 09/19/2017   • Chronic kidney disease, stage 3 [N18.3] 09/20/2017   • History of stroke [Z86.73] 09/19/2017   • SVT (supraventricular tachycardia) [I47.1] 09/19/2017   • Type 2 diabetes mellitus with complication [E11.8] 03/04/2016   • COPD (chronic obstructive pulmonary disease) [J44.9] 03/04/2016   • Essential hypertension [I10]           Past Medical/Surgical History:  Past Medical History:   Diagnosis Date   • COPD (chronic obstructive pulmonary disease)    • Diabetes mellitus    • History of transfusion    • Hyperlipidemia    • Hypertension    • Stroke 08/27/2017      Past Surgical History:   Procedure Laterality Date   • HYSTERECTOMY     • INCISION AND DRAINAGE HIP Left 9/20/2017    Procedure: Irrigation and debridement, left hip;  Surgeon: Marcello Reardon Jr., MD;  Location: [facility];  Service:    • OR OPEN FIX INTER/SUBTROCH FX,IMPLNT Left 8/23/2017    Procedure: FEMUR INTRAMEDULLARY NAILING/RODDING;  Surgeon: Marcello Reardon Jr., MD;  Location: [facility];  Service: Orthopedics   • TUBAL ABDOMINAL LIGATION          Current Objective Medical Evidence(including physical exam finding to support reason for limitations):    Immobilization syndrome  Requires oxygen    Other: Fall risk    Physician Signature:           (RN,NP,PA,CAN, Discharge Planner) Date/Time: 9/25/2017  12:48 PM       Printed Name:    __Tonie Buckner RN  ________________________________    [facility]  [facility]   Phone: [phone] Phone: [phone] Phone: [phone]   Fax: [phone] Fax: [phone] Fax: [phone]

## 2017-09-25 NOTE — PROGRESS NOTES
"  Infectious Diseases Progress Note    Nigel Veronica MD     [facility]  [place]: 6 days  Patient Identification:  Name: Jaci Cervantes  Age: 77 y.o.  Sex: female  :  1940  MRN: [id number]         Primary Care Physician: Stephan Felder MD            Subjective:awaiting discharge to rehab facility.  Interval History: Underwent I&D of hematoma last night with placement of drains, and subsequent introduction of vancomycin and Zosyn.     Objective:    Scheduled Meds:    amLODIPine 5 mg Oral Q24H   atorvastatin 80 mg Oral Nightly   insulin aspart 0-9 Units Subcutaneous 4x Daily With Meals & Nightly   metoprolol tartrate 50 mg Oral Q12H   piperacillin-tazobactam 3.375 g Intravenous Q8H   sennosides-docusate sodium 2 tablet Oral Nightly   sertraline 100 mg Oral Daily   vancomycin 1,250 mg Intravenous Q24H   warfarin 5 mg Oral Daily     Continuous Infusions:    Pharmacy to dose vancomycin        Vital signs in last 24 hours:  Temp:  [97.5 °F (36.4 °C)-98.3 °F (36.8 °C)] 97.5 °F (36.4 °C)  Heart Rate:  [] 86  Resp:  [18-20] 18  BP: (161-219)/() 190/87    Intake/Output:    Intake/Output Summary (Last 24 hours) at 17 1820  Last data filed at 17 1750   Gross per 24 hour   Intake              410 ml   Output              350 ml   Net               60 ml       Exam:  BP (!) 190/87 (BP Location: Left arm, Patient Position: Lying)  Pulse 86  Temp 97.5 °F (36.4 °C) (Oral)   Resp 18  Ht 68\" (172.7 cm)  Wt 257 lb 3.2 oz (117 kg)  SpO2 97%  BMI 39.11 kg/m2    General Appearance:    Alert, cooperative, no distress, AAOx3, And smiling.                          Head:    Normocephalic, without obvious abnormality, atraumatic                           Eyes:    PERRL, conjunctiva/corneas clear, EOM's intact, both eyes                         Throat:   Lips, tongue, gums normal; oral mucosa pink and moist                           Neck:   Supple, symmetrical, trachea midline, no JVD     "                     Lungs:    Clear to auscultation bilaterally, respirations unlabored                 Chest Wall:    No tenderness or deformity                          Heart:    Irregularly irregular rate and rhythm, S1 and S2 normal, no murmur,no  Rub                                      or gallop                  Abdomen:     Soft, non-tender, bowel sounds active, no masses, ecchymosis at the left lower quadrant area noted                 Extremities:   No significant oozing at the left hip surgical site.                       Pulses:   Pulses palpable in all extremities                                            Data Review:    I reviewed the patient's new clinical results.    Results from last 7 days  Lab Units 09/22/17  0557 09/21/17  0357 09/20/17  0403 09/19/17  1504   WBC 10*3/mm3 8.02 9.52 10.39 10.94*   HEMOGLOBIN g/dL 8.3* 8.1* 10.0* 10.0*   PLATELETS 10*3/mm3 138* 154 186 218       Results from last 7 days  Lab Units 09/24/17  0543 09/22/17  0557 09/21/17  0357 09/20/17  1421 09/20/17  0403 09/19/17  1504   SODIUM mmol/L 142 140 141  --  139 137   POTASSIUM mmol/L 3.8 3.9 3.7 3.9 3.2* 3.5   CHLORIDE mmol/L 103 102 102  --  95* 93*   CO2 mmol/L 30.0* 30.8* 30.7*  --  29.7* 31.6*   BUN mg/dL 12 16 18  --  21 21   CREATININE mg/dL 1.12* 1.24* 1.26*  --  1.43* 1.62*   CALCIUM mg/dL 9.3 8.8 8.7  --  9.2 9.5   GLUCOSE mg/dL 106* 117* 94  --  116* 163*       Microbiology Results (last 10 days)     Procedure Component Value - Date/Time    Anaerobic Culture [[id number]]  (Normal) Collected:  09/20/17 1850    Lab Status:  Final result Specimen:  Tissue from Hip, Left Updated:  09/25/17 0906     Culture No anaerobes isolated at 5 days    Tissue/Bone Culture [[id number]]  (Abnormal)  (Susceptibility) Collected:  09/20/17 1850    Lab Status:  Final result Specimen:  Tissue from Hip, Left Updated:  09/23/17 0933     Tissue Culture --      Light growth (2+) Enterococcus faecalis (A)     Gram Stain Result Many (4+) Red  blood cells      Many (4+) WBCs seen      No organisms seen    Susceptibility      Enterococcus faecalis     MIGUEL     Ampicillin <=2 ug/ml Susceptible     Gentamicin High Level Synergy SYN-S  Susceptible     Vancomycin 1 ug/ml Susceptible                    Anaerobic Culture [[id number]]  (Normal) Collected:  09/20/17 1848    Lab Status:  Final result Specimen:  Tissue from Hip, Left Updated:  09/25/17 0907     Culture No anaerobes isolated at 5 days    Tissue/Bone Culture [[id number]]  (Abnormal) Collected:  09/20/17 1848    Lab Status:  Final result Specimen:  Tissue from Hip, Left Updated:  09/24/17 0824     Tissue Culture --      Moderate growth (3+) Enterococcus faecalis (A)      Refer to previous tissue cultures done 9/20 for sensitivities        Gram Stain Result Many (4+) Red blood cells      Many (4+) WBCs seen      No organisms seen    Anaerobic Culture [[id number]]  (Normal) Collected:  09/20/17 1847    Lab Status:  Final result Specimen:  Wound from Hip, Left Updated:  09/25/17 0906     Culture No anaerobes isolated at 5 days    Wound Culture [[id number]]  (Abnormal)  (Susceptibility) Collected:  09/20/17 1847    Lab Status:  Final result Specimen:  Wound from Hip, Left Updated:  09/23/17 0923     Wound Culture --      Scant growth (1+) Enterococcus faecalis (A)      Rare Pseudomonas aeruginosa (A)     Gram Stain Result Moderate (3+) WBCs seen      No organisms seen    Susceptibility      Enterococcus faecalis     MIGUEL     Ampicillin <=2 ug/ml Susceptible     Gentamicin High Level Synergy SYN-S  Susceptible     Vancomycin 1 ug/ml Susceptible                Susceptibility      Pseudomonas aeruginosa     MIGUEL     Cefepime <=1 ug/ml Susceptible     Ceftazidime <=1 ug/ml Susceptible     Ciprofloxacin <=0.25 ug/ml Susceptible     Levofloxacin 0.5 ug/ml Susceptible     Meropenem <=0.25 ug/ml Susceptible     Piperacillin + Tazobactam <=4 ug/ml Susceptible     Tobramycin <=1 ug/ml Susceptible                    Blood  Culture [[id number]]  (Normal) Collected:  09/19/17 1824    Lab Status:  Final result Specimen:  Blood from Arm, Left Updated:  09/24/17 2201     Blood Culture No growth at 5 days    Blood Culture [[id number]]  (Normal) Collected:  09/19/17 1758    Lab Status:  Final result Specimen:  Blood from Arm, Right Updated:  09/24/17 1901     Blood Culture No growth at 5 days          Assessment:  Active Problems:    COPD (chronic obstructive pulmonary disease)    Essential hypertension    Type 2 diabetes mellitus with complication    History of stroke    Chronic kidney disease, stage 3      Plan/Discussion: see below  See my discussion on 9/22/2017.  Based on culture data her antibiotic regimen can be switched to much simpler program at the time of discharge consisting of Levaquin and Augmentin to address enterococcus and Pseudomonas for total of 14 days from final I&D.  If orthopedic surgery service, based on the intraoperative assessment, think that there is a concern for deeper structure involvement then above regimen can be extended to 6 weeks.  Obviously in that scenario she needs to be monitored very closely with weekly labs consisting of CBC CMP ESR and CRP and her INR needs to be monitored very closely because of the interaction as mentioned above. I will see her in my office on 10/11/2017. See follow up orders in AVS.  Nigel Veronica MD  9/25/2017  10:02 AM  Much of this encounter note is an electronic transcription/translation of spoken language to printed text. The electronic translation of spoken language may permit erroneous, or at times, nonsensical words or phrases to be inadvertently transcribed; Although I have reviewed the note for such errors, some may still exist

## 2017-09-25 NOTE — PROGRESS NOTES
[facility]  Cardiology Progress Note    Patient Identification:  Name: Jaci Cervantes  Age: 77 y.o.  Sex: female  :  1940  MRN: [id number]                 Follow Up / Chief Complaint: Preop clearance, NS SVCT,  h/o nonsustained SVT. (On Coumadin for CVA )    Interval History:  Asked to clear patient for surgery- history of recent ORIF with surgical wound infection     Subjective:  Denies chest pain, pressure or dizziness.  Denies shortness of breath.  Reports hip discomfort  Anticipating discharge soon      Objective:   No arrhythmias on telemetry  BP up today - 161/82 - 219/105    Past Medical History:  Past Medical History:   Diagnosis Date   • COPD (chronic obstructive pulmonary disease)    • Diabetes mellitus    • History of transfusion    • Hyperlipidemia    • Hypertension    • Stroke 2017     Past Surgical History:  Past Surgical History:   Procedure Laterality Date   • HYSTERECTOMY     • INCISION AND DRAINAGE HIP Left 2017    Procedure: Irrigation and debridement, left hip;  Surgeon: Marcello Reardon Jr., MD;  Location: [facility];  Service:    • UT OPEN FIX INTER/SUBTROCH FX,IMPLNT Left 2017    Procedure: FEMUR INTRAMEDULLARY NAILING/RODDING;  Surgeon: Marcello Reardon Jr., MD;  Location: [facility];  Service: Orthopedics   • TUBAL ABDOMINAL LIGATION          Social History:   Social History   Substance Use Topics   • Smoking status: Never Smoker   • Smokeless tobacco: Never Used   • Alcohol use No      Family History:  History reviewed. No pertinent family history.       Allergies:  No Known Allergies  Scheduled Meds:    amLODIPine 5 mg Q24H   atorvastatin 80 mg Nightly   insulin aspart 0-9 Units 4x Daily With Meals & Nightly   metoprolol tartrate 25 mg Q12H   piperacillin-tazobactam 3.375 g Q8H   sennosides-docusate sodium 2 tablet Nightly   sertraline 100 mg Daily   vancomycin 1,250 mg Q24H   warfarin 5 mg Daily           INTAKE AND OUTPUT:    Intake/Output  Summary (Last 24 hours) at 09/25/17 1641  Last data filed at 09/25/17 0805   Gross per 24 hour   Intake              380 ml   Output              350 ml   Net               30 ml       Review of Systems:   GI: No nausea or vomiting  Cardiac:  No chest pain or palpitations  Pulmonary:  No shortness of breath or cough    Constitutional:  Temp:  [97.5 °F (36.4 °C)-98.3 °F (36.8 °C)] 97.5 °F (36.4 °C)  Heart Rate:  [] 86  Resp:  [18-20] 18  BP: (161-219)/() 190/87    Physical Exam   General:  Awake, alert resting quietly.  Appears in no acute distress  Eyes: PERTL,  HEENT:  No JVD. Oral mucosa moist, no cyanosis  Respiratory: Respirations regular and unlabored at rest. BBS with good air entry in all fields. No crackles or wheezes auscultated  Cardiovascular: S1S2 Regular rate and rhythm. No murmur   No pretibial pitting edema  Gastrointestinal: Abdomen soft, non tender. Bowel sounds present.  Musculoskeletal: CAMARA x4 with  Limited ROM LLE.   No abnormal movements  Extremities: No digital clubbing or cyanosis  Skin: Color pale, pink. Skin warm and dry to touch. No rashes    Neuro: AAO x3 CN II-XII grossly intact  Psych: Mood and affect normal, pleasant and cooperative        Cardiographics  Telemetry:   SR with occasional PVC          Echocardiogram 8-2017   · Left ventricular systolic function is normal. Calculated EF = 57.6%. Estimated EF was in agreement with the calculated EF. Estimated EF = 58%. Normal left ventricular cavity size noted. All left ventricular wall segments contract normally. Left ventricular wall thickness is consistent with mild concentric hypertrophy. Left ventricular diastolic dysfunction is noted (grade I a w/high LAP) consistent with impaired relaxation.  · Left atrial volume is moderately increased. Saline test results are negative.  · The aortic valve is not well visualized. The aortic valve is abnormal in structure. There is mild thickening of the aortic valve.        Lab Review     "     Results from last 7 days  Lab Units 09/23/17  0546   MAGNESIUM mg/dL 2.1       Results from last 7 days  Lab Units 09/24/17  0543   SODIUM mmol/L 142   POTASSIUM mmol/L 3.8   BUN mg/dL 12   CREATININE mg/dL 1.12*   CALCIUM mg/dL 9.3       Results from last 7 days  Lab Units 09/22/17  0557 09/21/17  0357 09/20/17  0403   WBC 10*3/mm3 8.02 9.52 10.39   HEMOGLOBIN g/dL 8.3* 8.1* 10.0*   HEMATOCRIT % 26.8* 26.3* 32.0*   PLATELETS 10*3/mm3 138* 154 186       Results from last 7 days  Lab Units 09/25/17  0456 09/24/17  0543 09/23/17  0546   INR  2.97* 2.57* 2.27*         Assessment:   - NSVT  - h/o non sustained SVT  - HTN  - EF 55-60%, gr 1 chavez dysfx  - h/o left ORIF  - s/p I&D left hip wound  - Anemia  - DM  - Dyslipidemia      Plan:  - NSVT -  No recurrence  since beta blocker started    - HTN -  blood pressure remains labile and uncontrolled.  Increase beta blocker    - EF 55-60%, gr 1 chavez dysfx . On CCB, BB    Increase Lopressor for improve BP control          I reviewed the patient's new clinical results and treatment plan   I personally viewed and interpreted the patient's EKG/Telemetry data    )9/25/2017  Erlin Martinez MD      EMR Dragon/Transcription:   \"Dictated utilizing Dragon dictation\".     "

## 2017-09-25 NOTE — DISCHARGE SUMMARY
"Addendum to discharge summary    The patient's discharge was held last night while we were awaiting insurance precertification to be obtained.  Additionally, she did display elevated blood pressures.  She was given a dose of hydralazine and labetalol and blood pressure is improved this morning at 161/82.  She will be given her morning medications shortly. She was taken off of lisinopril on admission for MAURICE and CKD.  I will increase her norvasc at discharge to help compensate for this.  She has no complaints today and says she feels \"fine\" and that she is ready for discharge.  I spoke with her RN who reports no other overnight events.      Physical Exam   Constitutional: She is oriented to person, place, and time. No distress.   Cardiovascular: Normal rate and regular rhythm.    No murmur heard.  Pulmonary/Chest: Effort normal and breath sounds normal.   Abdominal: Soft. Bowel sounds are normal. She exhibits no distension. There is no tenderness.   Musculoskeletal: Normal range of motion. She exhibits no edema.   Neurological: She is alert and oriented to person, place, and time.   Skin: Skin is warm and dry. She is not diaphoretic.     The patient will be discharged today in stable condition.  For all other details see Dr. Fry's discharge summary from 9/24/17.    Cesar Santoyo MD    "

## 2017-09-25 NOTE — NURSING NOTE
Pt found during midnight vitals to have an elevated BP. Manual BP obtained which was 202/96. PO Norco given for mild pain and call put out to [facility]. Neuro check was completed, as pt has recent history of CVA. Findings WNL.Pt asymptomatic and denied headache. Orders given for IV hydralizine. IV hydralizine given and BP recheck was 219/105. Another call was put out to [facility].Orders given for IV labetolol. IV labetolol was given and BP trending downwards. BP recheck was 178/85. PRN dose of IV hydralzine given for systolic BP > 170. Pt BP monitored closely.

## 2017-09-25 NOTE — SIGNIFICANT NOTE
09/25/17 1043   PT Discharge Summary   Reason for Discharge Discharge from facility   Discharge Destination SNF

## 2017-09-25 NOTE — PROGRESS NOTES
Continued Stay Note  [facility]     Patient Name: Jaci Cervantes  MRN: [id number]  Today's Date: 9/25/2017    Admit Date: 9/19/2017          Discharge Plan       09/25/17 1304    Case Management/Social Work Plan    Plan [facility]    Additional Comments Message to Josep for disposition of pt and ambulance time  Call to johan daughter and to [place] daughter in law.  Call back number given              Discharge Codes     None        Expected Discharge Date and Time     Expected Discharge Date Expected Discharge Time    Sep 24, 2017             Tonie Buckner RN

## 2017-09-26 NOTE — PROGRESS NOTES
Case Management Discharge Note    Final Note: DC to [facility] via ambulance    Discharge Placement     Facility/Agency Request Status Selected? Address Phone Number Fax Number    [facility] Accepted    Yes [address] [facility] [id number] [phone]        Diana Barry RN 9/20/2017 14:48    Called to [facility].                   Ambulance: [facility]    Discharge Codes: 03  Discharged/transferred to skilled nursing facility (SNF) with Medicare certification in anticipation of skilled care

## 2017-09-26 NOTE — PLAN OF CARE
Problem: Patient Care Overview (Adult)  Goal: Plan of Care Review  Outcome: Outcome(s) achieved Date Met:  09/25/17  Pt discharged to [facility] this PM. Report called to nurse and pt was transported via ambulance-son at side. Rt upper thigh and leg with dressing dry and intact with no new drg noted-pt worked with PT and is anxious to go to rehab. Last B/P was 160/68 taken manually. Pt informed - no falls this admission-pt progressed towards goals and is going for short stay before returning home.

## 2017-12-06 ENCOUNTER — LAB REQUISITION (OUTPATIENT)
Dept: LAB | Facility: HOSPITAL | Age: 77
End: 2017-12-06

## 2017-12-06 DIAGNOSIS — N39.0 URINARY TRACT INFECTION: ICD-10-CM

## 2017-12-06 LAB
BACTERIA UR QL AUTO: ABNORMAL /HPF
BILIRUB UR QL STRIP: NEGATIVE
CLARITY UR: ABNORMAL
COLOR UR: YELLOW
GLUCOSE UR STRIP-MCNC: NEGATIVE MG/DL
HGB UR QL STRIP.AUTO: ABNORMAL
HYALINE CASTS UR QL AUTO: ABNORMAL /LPF
KETONES UR QL STRIP: NEGATIVE
LEUKOCYTE ESTERASE UR QL STRIP.AUTO: NEGATIVE
NITRITE UR QL STRIP: NEGATIVE
PH UR STRIP.AUTO: 6 [PH] (ref 5–8)
PROT UR QL STRIP: NEGATIVE
RBC # UR: ABNORMAL /HPF
REF LAB TEST METHOD: ABNORMAL
SP GR UR STRIP: 1.01 (ref 1–1.03)
SQUAMOUS #/AREA URNS HPF: ABNORMAL /HPF
UROBILINOGEN UR QL STRIP: ABNORMAL
WBC UR QL AUTO: ABNORMAL /HPF

## 2017-12-06 PROCEDURE — 81001 URINALYSIS AUTO W/SCOPE: CPT

## 2018-07-27 ENCOUNTER — LAB REQUISITION (OUTPATIENT)
Dept: LAB | Facility: HOSPITAL | Age: 78
End: 2018-07-27

## 2018-07-27 DIAGNOSIS — Z00.00 ROUTINE GENERAL MEDICAL EXAMINATION AT A HEALTH CARE FACILITY: ICD-10-CM

## 2018-07-27 LAB
ALBUMIN SERPL-MCNC: 3.9 G/DL (ref 3.5–5.2)
ALBUMIN/GLOB SERPL: 1.4 G/DL
ALP SERPL-CCNC: 101 U/L (ref 39–117)
ALT SERPL W P-5'-P-CCNC: 15 U/L (ref 1–33)
ANION GAP SERPL CALCULATED.3IONS-SCNC: 15.3 MMOL/L
AST SERPL-CCNC: 18 U/L (ref 1–32)
BILIRUB SERPL-MCNC: 0.2 MG/DL (ref 0.1–1.2)
BUN BLD-MCNC: 18 MG/DL (ref 8–23)
BUN/CREAT SERPL: 20.9 (ref 7–25)
CALCIUM SPEC-SCNC: 8.9 MG/DL (ref 8.6–10.5)
CHLORIDE SERPL-SCNC: 98 MMOL/L (ref 98–107)
CO2 SERPL-SCNC: 25.7 MMOL/L (ref 22–29)
CREAT BLD-MCNC: 0.86 MG/DL (ref 0.57–1)
GFR SERPL CREATININE-BSD FRML MDRD: 63 ML/MIN/1.73
GLOBULIN UR ELPH-MCNC: 2.8 GM/DL
GLUCOSE BLD-MCNC: 103 MG/DL (ref 65–99)
POTASSIUM BLD-SCNC: 4.5 MMOL/L (ref 3.5–5.2)
PROT SERPL-MCNC: 6.7 G/DL (ref 6–8.5)
SODIUM BLD-SCNC: 139 MMOL/L (ref 136–145)

## 2018-07-27 PROCEDURE — 80053 COMPREHEN METABOLIC PANEL: CPT

## 2024-04-30 ENCOUNTER — TELEPHONE (OUTPATIENT)
Dept: SLEEP MEDICINE | Age: 84
End: 2024-04-30
Payer: MEDICARE

## 2024-04-30 NOTE — TELEPHONE ENCOUNTER
Patient called having problems with getting CPAP supplies from [facility]. Per [facility] they have to have a prescription for each CPAP supplies ordered for Medicare.Patient has been without supplies 10 days. Please help call patient if ay questions

## (undated) DEVICE — GLV SURG BIOGEL LTX PF 8

## (undated) DEVICE — 4.0MM SHORT PILOT DRILL WITH AO CONNECTOR: Brand: TRIGEN

## (undated) DEVICE — SUT VIC 0 CT1 36IN J946H

## (undated) DEVICE — OCCLUSIVE GAUZE STRIP,3% BISMUTH TRIBROMOPHENATE IN PETROLATUM BLEND: Brand: XEROFORM

## (undated) DEVICE — DRSNG SURESITE123 8X12IN

## (undated) DEVICE — PK HIP TOTL 40

## (undated) DEVICE — U-DRAPE: Brand: CONVERTORS

## (undated) DEVICE — ANTIBACTERIAL UNDYED BRAIDED (POLYGLACTIN 910), SYNTHETIC ABSORBABLE SUTURE: Brand: COATED VICRYL

## (undated) DEVICE — TRY SKINPREP DRYPREP

## (undated) DEVICE — ENCORE® LATEX ORTHO SIZE 8, STERILE LATEX POWDER-FREE SURGICAL GLOVE: Brand: ENCORE

## (undated) DEVICE — GUIDE PIN 3.2MM X 343MM: Brand: TRIGEN

## (undated) DEVICE — APPL CHLORAPREP W/TINT 26ML ORNG

## (undated) DEVICE — PAD,ABDOMINAL,8"X10",ST,LF: Brand: MEDLINE

## (undated) DEVICE — DRSNG SURESITE WNDW 4X4.5

## (undated) DEVICE — LEGGINGS, PAIR, CLEAR, STERILE: Brand: MEDLINE

## (undated) DEVICE — CULT AER/ANAEROB FASTIDIOUS BACT

## (undated) DEVICE — SOL ISO/ALC RUB 70PCT 4OZ

## (undated) DEVICE — SHEET, DRAPE, SPLIT, STERILE: Brand: MEDLINE

## (undated) DEVICE — SUT ETHIB 2 CV V37 MS/4 30IN MX69G

## (undated) DEVICE — BNDG ELAS CO-FLEX SLF ADHR 6IN 5YD LF STRL

## (undated) DEVICE — PK HIP PINNING 40

## (undated) DEVICE — SPNG GZ WOVN 4X4IN 12PLY 10/BX STRL

## (undated) DEVICE — MAT FLR ABSORBENT LG 4FT 10 2.5FT

## (undated) DEVICE — 3.0MM X 1000MM BALL TIP GUIDE ROD: Brand: TRIGEN

## (undated) DEVICE — KT DRN EVAC WND PVC PCH WTROC RND 10F400

## (undated) DEVICE — BANDAGE,GAUZE,BULKEE II,4.5"X4.1YD,STRL: Brand: MEDLINE

## (undated) DEVICE — ST IRR CYSTO W/SPK 77IN LF

## (undated) DEVICE — TAPE MICROFM 2IN LF

## (undated) DEVICE — DRP C/ARMOR